# Patient Record
Sex: MALE | Race: WHITE | NOT HISPANIC OR LATINO | URBAN - METROPOLITAN AREA
[De-identification: names, ages, dates, MRNs, and addresses within clinical notes are randomized per-mention and may not be internally consistent; named-entity substitution may affect disease eponyms.]

---

## 2021-08-25 ENCOUNTER — INPATIENT (INPATIENT)
Facility: HOSPITAL | Age: 71
LOS: 15 days | Discharge: HOME HEALTH | DRG: 177 | End: 2021-09-10
Attending: FAMILY MEDICINE | Admitting: HOSPITALIST
Payer: MEDICARE

## 2021-08-25 VITALS
DIASTOLIC BLOOD PRESSURE: 61 MMHG | TEMPERATURE: 100 F | SYSTOLIC BLOOD PRESSURE: 131 MMHG | OXYGEN SATURATION: 86 % | RESPIRATION RATE: 18 BRPM | WEIGHT: 199.96 LBS | HEIGHT: 72 IN | HEART RATE: 81 BPM

## 2021-08-25 DIAGNOSIS — J96.00 ACUTE RESPIRATORY FAILURE, UNSPECIFIED WHETHER WITH HYPOXIA OR HYPERCAPNIA: ICD-10-CM

## 2021-08-25 LAB
ALBUMIN SERPL ELPH-MCNC: 2.4 G/DL — LOW (ref 3.3–5)
ALP SERPL-CCNC: 142 U/L — HIGH (ref 40–120)
ALT FLD-CCNC: 86 U/L — HIGH (ref 12–78)
ANION GAP SERPL CALC-SCNC: 8 MMOL/L — SIGNIFICANT CHANGE UP (ref 5–17)
APTT BLD: 25 SEC — LOW (ref 27.5–35.5)
AST SERPL-CCNC: 69 U/L — HIGH (ref 15–37)
BASE EXCESS BLDV CALC-SCNC: 1 MMOL/L — SIGNIFICANT CHANGE UP (ref -2–2)
BASOPHILS # BLD AUTO: 0.03 K/UL — SIGNIFICANT CHANGE UP (ref 0–0.2)
BASOPHILS NFR BLD AUTO: 0.3 % — SIGNIFICANT CHANGE UP (ref 0–2)
BILIRUB SERPL-MCNC: 1.5 MG/DL — HIGH (ref 0.2–1.2)
BUN SERPL-MCNC: 17 MG/DL — SIGNIFICANT CHANGE UP (ref 7–23)
CALCIUM SERPL-MCNC: 8.2 MG/DL — LOW (ref 8.5–10.1)
CHLORIDE SERPL-SCNC: 103 MMOL/L — SIGNIFICANT CHANGE UP (ref 96–108)
CK SERPL-CCNC: 78 U/L — SIGNIFICANT CHANGE UP (ref 26–308)
CO2 SERPL-SCNC: 24 MMOL/L — SIGNIFICANT CHANGE UP (ref 22–31)
CREAT SERPL-MCNC: 0.81 MG/DL — SIGNIFICANT CHANGE UP (ref 0.5–1.3)
D DIMER BLD IA.RAPID-MCNC: 1819 NG/ML DDU — HIGH
EOSINOPHIL # BLD AUTO: 0.12 K/UL — SIGNIFICANT CHANGE UP (ref 0–0.5)
EOSINOPHIL NFR BLD AUTO: 1 % — SIGNIFICANT CHANGE UP (ref 0–6)
GLUCOSE SERPL-MCNC: 118 MG/DL — HIGH (ref 70–99)
HCO3 BLDV-SCNC: 23 MMOL/L — SIGNIFICANT CHANGE UP (ref 21–29)
HCT VFR BLD CALC: 38.1 % — LOW (ref 39–50)
HGB BLD-MCNC: 13.7 G/DL — SIGNIFICANT CHANGE UP (ref 13–17)
IMM GRANULOCYTES NFR BLD AUTO: 1.5 % — SIGNIFICANT CHANGE UP (ref 0–1.5)
INR BLD: 1.22 RATIO — HIGH (ref 0.88–1.16)
LACTATE SERPL-SCNC: 2.4 MMOL/L — HIGH (ref 0.7–2)
LYMPHOCYTES # BLD AUTO: 0.51 K/UL — LOW (ref 1–3.3)
LYMPHOCYTES # BLD AUTO: 4.3 % — LOW (ref 13–44)
MCHC RBC-ENTMCNC: 32.9 PG — SIGNIFICANT CHANGE UP (ref 27–34)
MCHC RBC-ENTMCNC: 36 GM/DL — SIGNIFICANT CHANGE UP (ref 32–36)
MCV RBC AUTO: 91.6 FL — SIGNIFICANT CHANGE UP (ref 80–100)
MONOCYTES # BLD AUTO: 0.53 K/UL — SIGNIFICANT CHANGE UP (ref 0–0.9)
MONOCYTES NFR BLD AUTO: 4.5 % — SIGNIFICANT CHANGE UP (ref 2–14)
NEUTROPHILS # BLD AUTO: 10.45 K/UL — HIGH (ref 1.8–7.4)
NEUTROPHILS NFR BLD AUTO: 88.4 % — HIGH (ref 43–77)
NT-PROBNP SERPL-SCNC: 2205 PG/ML — HIGH (ref 0–125)
PCO2 BLDV: 29 MMHG — LOW (ref 35–50)
PH BLDV: 7.51 — HIGH (ref 7.35–7.45)
PLATELET # BLD AUTO: 252 K/UL — SIGNIFICANT CHANGE UP (ref 150–400)
PO2 BLDV: 60 MMHG — HIGH (ref 25–45)
POTASSIUM SERPL-MCNC: 3 MMOL/L — LOW (ref 3.5–5.3)
POTASSIUM SERPL-SCNC: 3 MMOL/L — LOW (ref 3.5–5.3)
PROT SERPL-MCNC: 6.5 GM/DL — SIGNIFICANT CHANGE UP (ref 6–8.3)
PROTHROM AB SERPL-ACNC: 14 SEC — HIGH (ref 10.6–13.6)
RBC # BLD: 4.16 M/UL — LOW (ref 4.2–5.8)
RBC # FLD: 12.2 % — SIGNIFICANT CHANGE UP (ref 10.3–14.5)
SAO2 % BLDV: 92 % — HIGH (ref 67–88)
SODIUM SERPL-SCNC: 135 MMOL/L — SIGNIFICANT CHANGE UP (ref 135–145)
TROPONIN I SERPL-MCNC: <0.015 NG/ML — SIGNIFICANT CHANGE UP (ref 0.01–0.04)
WBC # BLD: 11.82 K/UL — HIGH (ref 3.8–10.5)
WBC # FLD AUTO: 11.82 K/UL — HIGH (ref 3.8–10.5)

## 2021-08-25 PROCEDURE — 71275 CT ANGIOGRAPHY CHEST: CPT | Mod: 26

## 2021-08-25 PROCEDURE — 97162 PT EVAL MOD COMPLEX 30 MIN: CPT | Mod: GP

## 2021-08-25 PROCEDURE — 71275 CT ANGIOGRAPHY CHEST: CPT

## 2021-08-25 PROCEDURE — 36415 COLL VENOUS BLD VENIPUNCTURE: CPT

## 2021-08-25 PROCEDURE — 86140 C-REACTIVE PROTEIN: CPT

## 2021-08-25 PROCEDURE — 71045 X-RAY EXAM CHEST 1 VIEW: CPT | Mod: 26

## 2021-08-25 PROCEDURE — 83036 HEMOGLOBIN GLYCOSYLATED A1C: CPT

## 2021-08-25 PROCEDURE — 81001 URINALYSIS AUTO W/SCOPE: CPT

## 2021-08-25 PROCEDURE — 74176 CT ABD & PELVIS W/O CONTRAST: CPT

## 2021-08-25 PROCEDURE — 85379 FIBRIN DEGRADATION QUANT: CPT

## 2021-08-25 PROCEDURE — 82565 ASSAY OF CREATININE: CPT

## 2021-08-25 PROCEDURE — 86803 HEPATITIS C AB TEST: CPT

## 2021-08-25 PROCEDURE — 80053 COMPREHEN METABOLIC PANEL: CPT

## 2021-08-25 PROCEDURE — 93010 ELECTROCARDIOGRAM REPORT: CPT

## 2021-08-25 PROCEDURE — 71045 X-RAY EXAM CHEST 1 VIEW: CPT

## 2021-08-25 PROCEDURE — 94618 PULMONARY STRESS TESTING: CPT

## 2021-08-25 PROCEDURE — 84134 ASSAY OF PREALBUMIN: CPT

## 2021-08-25 PROCEDURE — 83605 ASSAY OF LACTIC ACID: CPT

## 2021-08-25 PROCEDURE — 97116 GAIT TRAINING THERAPY: CPT | Mod: GP

## 2021-08-25 PROCEDURE — 87521 HEPATITIS C PROBE&RVRS TRNSC: CPT

## 2021-08-25 PROCEDURE — 87070 CULTURE OTHR SPECIMN AEROBIC: CPT

## 2021-08-25 PROCEDURE — 82962 GLUCOSE BLOOD TEST: CPT

## 2021-08-25 PROCEDURE — 94640 AIRWAY INHALATION TREATMENT: CPT

## 2021-08-25 PROCEDURE — 85027 COMPLETE CBC AUTOMATED: CPT

## 2021-08-25 PROCEDURE — 99285 EMERGENCY DEPT VISIT HI MDM: CPT | Mod: CS

## 2021-08-25 PROCEDURE — 87040 BLOOD CULTURE FOR BACTERIA: CPT

## 2021-08-25 PROCEDURE — 87186 SC STD MICRODIL/AGAR DIL: CPT

## 2021-08-25 PROCEDURE — C9399: CPT

## 2021-08-25 PROCEDURE — 86769 SARS-COV-2 COVID-19 ANTIBODY: CPT

## 2021-08-25 PROCEDURE — 85730 THROMBOPLASTIN TIME PARTIAL: CPT

## 2021-08-25 PROCEDURE — 82728 ASSAY OF FERRITIN: CPT

## 2021-08-25 PROCEDURE — 85025 COMPLETE CBC W/AUTO DIFF WBC: CPT

## 2021-08-25 PROCEDURE — 80048 BASIC METABOLIC PNL TOTAL CA: CPT

## 2021-08-25 PROCEDURE — 80076 HEPATIC FUNCTION PANEL: CPT

## 2021-08-25 PROCEDURE — 93970 EXTREMITY STUDY: CPT

## 2021-08-25 PROCEDURE — 85610 PROTHROMBIN TIME: CPT

## 2021-08-25 PROCEDURE — 87077 CULTURE AEROBIC IDENTIFY: CPT

## 2021-08-25 PROCEDURE — 83880 ASSAY OF NATRIURETIC PEPTIDE: CPT

## 2021-08-25 PROCEDURE — 82248 BILIRUBIN DIRECT: CPT

## 2021-08-25 RX ORDER — REMDESIVIR 5 MG/ML
INJECTION INTRAVENOUS
Refills: 0 | Status: COMPLETED | OUTPATIENT
Start: 2021-08-25 | End: 2021-08-29

## 2021-08-25 RX ORDER — SODIUM CHLORIDE 9 MG/ML
500 INJECTION INTRAMUSCULAR; INTRAVENOUS; SUBCUTANEOUS ONCE
Refills: 0 | Status: COMPLETED | OUTPATIENT
Start: 2021-08-25 | End: 2021-08-25

## 2021-08-25 RX ORDER — DEXAMETHASONE 0.5 MG/5ML
6 ELIXIR ORAL ONCE
Refills: 0 | Status: COMPLETED | OUTPATIENT
Start: 2021-08-25 | End: 2021-08-25

## 2021-08-25 RX ORDER — ALBUTEROL 90 UG/1
2 AEROSOL, METERED ORAL EVERY 4 HOURS
Refills: 0 | Status: DISCONTINUED | OUTPATIENT
Start: 2021-08-25 | End: 2021-09-10

## 2021-08-25 RX ORDER — ACETAMINOPHEN 500 MG
1000 TABLET ORAL EVERY 6 HOURS
Refills: 0 | Status: DISCONTINUED | OUTPATIENT
Start: 2021-08-25 | End: 2021-08-26

## 2021-08-25 RX ORDER — REMDESIVIR 5 MG/ML
200 INJECTION INTRAVENOUS EVERY 24 HOURS
Refills: 0 | Status: COMPLETED | OUTPATIENT
Start: 2021-08-25 | End: 2021-08-25

## 2021-08-25 RX ORDER — REMDESIVIR 5 MG/ML
100 INJECTION INTRAVENOUS EVERY 24 HOURS
Refills: 0 | Status: COMPLETED | OUTPATIENT
Start: 2021-08-26 | End: 2021-08-29

## 2021-08-25 RX ORDER — POTASSIUM CHLORIDE 20 MEQ
40 PACKET (EA) ORAL ONCE
Refills: 0 | Status: COMPLETED | OUTPATIENT
Start: 2021-08-25 | End: 2021-08-25

## 2021-08-25 RX ADMIN — Medication 6 MILLIGRAM(S): at 21:19

## 2021-08-25 RX ADMIN — REMDESIVIR 580 MILLIGRAM(S): 5 INJECTION INTRAVENOUS at 22:54

## 2021-08-25 RX ADMIN — Medication 1000 MILLIGRAM(S): at 20:57

## 2021-08-25 RX ADMIN — ALBUTEROL 2 PUFF(S): 90 AEROSOL, METERED ORAL at 20:58

## 2021-08-25 RX ADMIN — Medication 40 MILLIEQUIVALENT(S): at 23:32

## 2021-08-25 RX ADMIN — SODIUM CHLORIDE 500 MILLILITER(S): 9 INJECTION INTRAMUSCULAR; INTRAVENOUS; SUBCUTANEOUS at 20:57

## 2021-08-25 NOTE — ED PROVIDER NOTE - NS ED ROS FT
Review of Systems:  	•	CONSTITUTIONAL: fever  	•	SKIN: no rash  	•	RESPIRATORY: Hypoxic  	•	CARDIAC: no chest pain  	•	GI:  no abd pain, no nausea, no vomiting, no diarrhea  	•	GENITO-URINARY:  no dysuria  	•	MUSCULOSKELETAL:  no back pain  	•	NEUROLOGIC: no weakness  	•	ALLERGY: no rhinorrhea  	•	PSYSCHIATRIC: appropriate concern about symptoms Review of Systems:  	•	CONSTITUTIONAL: fever  	•	SKIN: no rash  	•	RESPIRATORY: Hypoxic, SOB  	•	CARDIAC: no chest pain  	•	GI:  no abd pain, no nausea, no vomiting, no diarrhea  	•	GENITO-URINARY:  no dysuria  	•	MUSCULOSKELETAL:  no back pain  	•	NEUROLOGIC: no weakness  	•	ALLERGY: no rhinorrhea  	•	PSYSCHIATRIC: appropriate concern about symptoms Review of Systems:  	•	CONSTITUTIONAL: fever, chills  	•	SKIN: no rash  	•	RESPIRATORY: Hypoxic, SOB, SALDAÑA  	•	CARDIAC: no chest pain  	•	GI:  no abd pain, no nausea, no vomiting, no diarrhea  	•	GENITO-URINARY:  no dysuria  	•	MUSCULOSKELETAL:  body aches  	•	NEUROLOGIC: no weakness  	•	ALLERGY: no rhinorrhea  	•	PSYSCHIATRIC: appropriate concern about symptoms

## 2021-08-25 NOTE — ED PROVIDER NOTE - PROGRESS NOTE DETAILS
meli: Discussed need to admit with patient & discussed risk and benefits.  Patient agreed to admission.  Discussed case w/ admitting doctor - agreed to admit to their service. will place bridge orders. Accepting physician said:  DO NOT MOVE prior to inpatient team evaluation

## 2021-08-25 NOTE — ED PROVIDER NOTE - OBJECTIVE STATEMENT
Pertinent HPI/PMH/PSH/FHx/SHx and Review of Systems contained within  HPI:  Patient p/w CC   x  days, new onset vs acute on chronic.   PMH/PSH relevant for:   ROS negative for: fever, Chest pain, SOB, Nausea, vomiting, diarrhea, abdominal pain, dysuria    FamilyHx and SocialHx not otherwise contributory Pertinent HPI/PMH/PSH/FHx/SHx and Review of Systems contained within  HPI:  Patient p/w CC  fever x  days, new onset vs acute on chronic.   PMH/PSH relevant for:   ROS negative for: fever, Chest pain, SOB, Nausea, vomiting, diarrhea, abdominal pain, dysuria    FamilyHx and SocialHx not otherwise contributory Pertinent HPI/PMH/PSH/FHx/SHx and Review of Systems contained within  HPI:  Patient p/w CC  SOB and SALDAÑA x 2-3 days. Over past 2 weeks have had fevers, chills, cough, and body aches.  Of note, patient's O2 sat 86% on room air.   PMH/PSH relevant for: HTN  ROS negative for: Chest pain, Nausea, vomiting, diarrhea, abdominal pain, dysuria    FamilyHx and SocialHx not otherwise contributory Pertinent HPI/PMH/PSH/FHx/SHx and Review of Systems contained within  HPI:  Patient p/w CC  SOB and SALDAÑA x 2-3 days. Over past 2 weeks have had fevers, chills, cough, and body aches. Pt is not COVID vaccinated. Of note, patient's O2 sat 86% on room air.   PMH/PSH relevant for: HTN  ROS negative for: Chest pain, Nausea, vomiting, diarrhea, abdominal pain, dysuria    FamilyHx and SocialHx not otherwise contributory

## 2021-08-25 NOTE — ED PROVIDER NOTE - CLINICAL SUMMARY MEDICAL DECISION MAKING FREE TEXT BOX
URI complaints, hypoxic O2 86% on room air. Likely due to COVID since pt is unvaccinated. Will need admission.

## 2021-08-25 NOTE — ED ADULT NURSE REASSESSMENT NOTE - NS ED NURSE REASSESS COMMENT FT1
dyspnea upon exertion persists, current o2 Sat 92% via 4lNC, pt intermittent productive cough. Remdesivir IV in progress. Will continue to monitor.

## 2021-08-25 NOTE — ED PROVIDER NOTE - IV ALTEPASE ADMIN HIDDEN
Fax received from [facility] [place] regarding [place]  Pt location at SNF: [address] wing  Fax sent by:     Fax regarding concern: Update    Assessment:         Any recommendations or new orders?      
Noted.   [facility]    
Noted. Thank you  
show

## 2021-08-25 NOTE — ED PROVIDER NOTE - CARE PLAN
1 Principal Discharge DX:	Acute respiratory failure, unspecified whether with hypoxia or hypercapnia  Secondary Diagnosis:	Fever

## 2021-08-26 DIAGNOSIS — Z98.890 OTHER SPECIFIED POSTPROCEDURAL STATES: Chronic | ICD-10-CM

## 2021-08-26 LAB
-  COAGULASE NEGATIVE STAPHYLOCOCCUS: SIGNIFICANT CHANGE UP
A1C WITH ESTIMATED AVERAGE GLUCOSE RESULT: 6.3 % — HIGH (ref 4–5.6)
ALBUMIN SERPL ELPH-MCNC: 2.1 G/DL — LOW (ref 3.3–5)
ALP SERPL-CCNC: 133 U/L — HIGH (ref 40–120)
ALT FLD-CCNC: 78 U/L — SIGNIFICANT CHANGE UP (ref 12–78)
ANION GAP SERPL CALC-SCNC: 7 MMOL/L — SIGNIFICANT CHANGE UP (ref 5–17)
AST SERPL-CCNC: 57 U/L — HIGH (ref 15–37)
BASOPHILS # BLD AUTO: 0 K/UL — SIGNIFICANT CHANGE UP (ref 0–0.2)
BASOPHILS NFR BLD AUTO: 0 % — SIGNIFICANT CHANGE UP (ref 0–2)
BILIRUB SERPL-MCNC: 0.8 MG/DL — SIGNIFICANT CHANGE UP (ref 0.2–1.2)
BUN SERPL-MCNC: 19 MG/DL — SIGNIFICANT CHANGE UP (ref 7–23)
CALCIUM SERPL-MCNC: 8.3 MG/DL — LOW (ref 8.5–10.1)
CHLORIDE SERPL-SCNC: 108 MMOL/L — SIGNIFICANT CHANGE UP (ref 96–108)
CO2 SERPL-SCNC: 22 MMOL/L — SIGNIFICANT CHANGE UP (ref 22–31)
COVID-19 SPIKE DOMAIN AB INTERP: POSITIVE
COVID-19 SPIKE DOMAIN ANTIBODY RESULT: 186 U/ML — HIGH
CREAT SERPL-MCNC: 0.89 MG/DL — SIGNIFICANT CHANGE UP (ref 0.5–1.3)
CRP SERPL-MCNC: 224 MG/L — HIGH
EOSINOPHIL # BLD AUTO: 0 K/UL — SIGNIFICANT CHANGE UP (ref 0–0.5)
EOSINOPHIL NFR BLD AUTO: 0 % — SIGNIFICANT CHANGE UP (ref 0–6)
ESTIMATED AVERAGE GLUCOSE: 134 MG/DL — HIGH (ref 68–114)
FERRITIN SERPL-MCNC: 1675 NG/ML — HIGH (ref 30–400)
GLUCOSE SERPL-MCNC: 269 MG/DL — HIGH (ref 70–99)
GRAM STN FLD: SIGNIFICANT CHANGE UP
GRAM STN FLD: SIGNIFICANT CHANGE UP
HCT VFR BLD CALC: 36.2 % — LOW (ref 39–50)
HCV AB S/CO SERPL IA: 7.16 S/CO — HIGH (ref 0–0.99)
HCV AB SERPL-IMP: REACTIVE
HGB BLD-MCNC: 12.6 G/DL — LOW (ref 13–17)
LACTATE SERPL-SCNC: 2.5 MMOL/L — HIGH (ref 0.7–2)
LYMPHOCYTES # BLD AUTO: 0.43 K/UL — LOW (ref 1–3.3)
LYMPHOCYTES # BLD AUTO: 5 % — LOW (ref 13–44)
MCHC RBC-ENTMCNC: 32.2 PG — SIGNIFICANT CHANGE UP (ref 27–34)
MCHC RBC-ENTMCNC: 34.8 GM/DL — SIGNIFICANT CHANGE UP (ref 32–36)
MCV RBC AUTO: 92.6 FL — SIGNIFICANT CHANGE UP (ref 80–100)
METHOD TYPE: SIGNIFICANT CHANGE UP
MONOCYTES # BLD AUTO: 0.35 K/UL — SIGNIFICANT CHANGE UP (ref 0–0.9)
MONOCYTES NFR BLD AUTO: 4 % — SIGNIFICANT CHANGE UP (ref 2–14)
NEUTROPHILS # BLD AUTO: 7.86 K/UL — HIGH (ref 1.8–7.4)
NEUTROPHILS NFR BLD AUTO: 91 % — HIGH (ref 43–77)
NRBC # BLD: SIGNIFICANT CHANGE UP /100 WBCS (ref 0–0)
PLATELET # BLD AUTO: 230 K/UL — SIGNIFICANT CHANGE UP (ref 150–400)
POTASSIUM SERPL-MCNC: 3.6 MMOL/L — SIGNIFICANT CHANGE UP (ref 3.5–5.3)
POTASSIUM SERPL-SCNC: 3.6 MMOL/L — SIGNIFICANT CHANGE UP (ref 3.5–5.3)
PREALB SERPL-MCNC: 4 MG/DL — LOW (ref 20–40)
PROCALCITONIN SERPL-MCNC: 0.33 NG/ML — HIGH (ref 0.02–0.1)
PROT SERPL-MCNC: 6.1 GM/DL — SIGNIFICANT CHANGE UP (ref 6–8.3)
RAPID RVP RESULT: DETECTED
RBC # BLD: 3.91 M/UL — LOW (ref 4.2–5.8)
RBC # FLD: 12.4 % — SIGNIFICANT CHANGE UP (ref 10.3–14.5)
SARS-COV-2 IGG+IGM SERPL QL IA: 186 U/ML — HIGH
SARS-COV-2 IGG+IGM SERPL QL IA: POSITIVE
SARS-COV-2 RNA SPEC QL NAA+PROBE: DETECTED
SODIUM SERPL-SCNC: 137 MMOL/L — SIGNIFICANT CHANGE UP (ref 135–145)
SPECIMEN SOURCE: SIGNIFICANT CHANGE UP
WBC # BLD: 8.64 K/UL — SIGNIFICANT CHANGE UP (ref 3.8–10.5)
WBC # FLD AUTO: 8.64 K/UL — SIGNIFICANT CHANGE UP (ref 3.8–10.5)

## 2021-08-26 PROCEDURE — 12345: CPT | Mod: NC

## 2021-08-26 PROCEDURE — 99497 ADVNCD CARE PLAN 30 MIN: CPT | Mod: 25

## 2021-08-26 PROCEDURE — 99223 1ST HOSP IP/OBS HIGH 75: CPT

## 2021-08-26 PROCEDURE — 74176 CT ABD & PELVIS W/O CONTRAST: CPT | Mod: 26

## 2021-08-26 RX ORDER — DULOXETINE HYDROCHLORIDE 30 MG/1
60 CAPSULE, DELAYED RELEASE ORAL DAILY
Refills: 0 | Status: DISCONTINUED | OUTPATIENT
Start: 2021-08-26 | End: 2021-09-10

## 2021-08-26 RX ORDER — ONDANSETRON 8 MG/1
4 TABLET, FILM COATED ORAL EVERY 8 HOURS
Refills: 0 | Status: DISCONTINUED | OUTPATIENT
Start: 2021-08-26 | End: 2021-09-10

## 2021-08-26 RX ORDER — POTASSIUM CHLORIDE 20 MEQ
40 PACKET (EA) ORAL ONCE
Refills: 0 | Status: COMPLETED | OUTPATIENT
Start: 2021-08-26 | End: 2021-08-26

## 2021-08-26 RX ORDER — DEXTROSE 50 % IN WATER 50 %
15 SYRINGE (ML) INTRAVENOUS ONCE
Refills: 0 | Status: DISCONTINUED | OUTPATIENT
Start: 2021-08-26 | End: 2021-09-10

## 2021-08-26 RX ORDER — ZOLPIDEM TARTRATE 10 MG/1
5 TABLET ORAL AT BEDTIME
Refills: 0 | Status: DISCONTINUED | OUTPATIENT
Start: 2021-08-26 | End: 2021-08-27

## 2021-08-26 RX ORDER — DEXTROSE 50 % IN WATER 50 %
25 SYRINGE (ML) INTRAVENOUS ONCE
Refills: 0 | Status: DISCONTINUED | OUTPATIENT
Start: 2021-08-26 | End: 2021-09-10

## 2021-08-26 RX ORDER — GLUCAGON INJECTION, SOLUTION 0.5 MG/.1ML
1 INJECTION, SOLUTION SUBCUTANEOUS ONCE
Refills: 0 | Status: DISCONTINUED | OUTPATIENT
Start: 2021-08-26 | End: 2021-09-10

## 2021-08-26 RX ORDER — INSULIN LISPRO 100/ML
VIAL (ML) SUBCUTANEOUS AT BEDTIME
Refills: 0 | Status: DISCONTINUED | OUTPATIENT
Start: 2021-08-26 | End: 2021-09-10

## 2021-08-26 RX ORDER — ZOLPIDEM TARTRATE 10 MG/1
5 TABLET ORAL AT BEDTIME
Refills: 0 | Status: DISCONTINUED | OUTPATIENT
Start: 2021-08-26 | End: 2021-08-26

## 2021-08-26 RX ORDER — ACETAMINOPHEN 500 MG
650 TABLET ORAL EVERY 4 HOURS
Refills: 0 | Status: DISCONTINUED | OUTPATIENT
Start: 2021-08-26 | End: 2021-09-10

## 2021-08-26 RX ORDER — GUAIFENESIN/DEXTROMETHORPHAN 600MG-30MG
10 TABLET, EXTENDED RELEASE 12 HR ORAL EVERY 4 HOURS
Refills: 0 | Status: DISCONTINUED | OUTPATIENT
Start: 2021-08-26 | End: 2021-09-10

## 2021-08-26 RX ORDER — DEXTROSE 50 % IN WATER 50 %
12.5 SYRINGE (ML) INTRAVENOUS ONCE
Refills: 0 | Status: DISCONTINUED | OUTPATIENT
Start: 2021-08-26 | End: 2021-09-10

## 2021-08-26 RX ORDER — ZOLPIDEM TARTRATE 10 MG/1
5 TABLET ORAL AT BEDTIME
Refills: 0 | Status: DISCONTINUED | OUTPATIENT
Start: 2021-08-26 | End: 2021-09-02

## 2021-08-26 RX ORDER — VANCOMYCIN HCL 1 G
VIAL (EA) INTRAVENOUS
Refills: 0 | Status: DISCONTINUED | OUTPATIENT
Start: 2021-08-26 | End: 2021-08-26

## 2021-08-26 RX ORDER — SODIUM CHLORIDE 9 MG/ML
1000 INJECTION, SOLUTION INTRAVENOUS
Refills: 0 | Status: DISCONTINUED | OUTPATIENT
Start: 2021-08-26 | End: 2021-09-10

## 2021-08-26 RX ORDER — INSULIN LISPRO 100/ML
VIAL (ML) SUBCUTANEOUS
Refills: 0 | Status: DISCONTINUED | OUTPATIENT
Start: 2021-08-26 | End: 2021-09-10

## 2021-08-26 RX ORDER — AMLODIPINE BESYLATE 2.5 MG/1
5 TABLET ORAL DAILY
Refills: 0 | Status: DISCONTINUED | OUTPATIENT
Start: 2021-08-26 | End: 2021-09-10

## 2021-08-26 RX ORDER — ALBUTEROL 90 UG/1
2 AEROSOL, METERED ORAL EVERY 4 HOURS
Refills: 0 | Status: DISCONTINUED | OUTPATIENT
Start: 2021-08-26 | End: 2021-08-26

## 2021-08-26 RX ORDER — SIMETHICONE 80 MG/1
80 TABLET, CHEWABLE ORAL
Refills: 0 | Status: DISCONTINUED | OUTPATIENT
Start: 2021-08-26 | End: 2021-09-10

## 2021-08-26 RX ORDER — DULOXETINE HYDROCHLORIDE 30 MG/1
1 CAPSULE, DELAYED RELEASE ORAL
Qty: 0 | Refills: 0 | DISCHARGE

## 2021-08-26 RX ORDER — ZOLPIDEM TARTRATE 10 MG/1
1 TABLET ORAL
Qty: 0 | Refills: 0 | DISCHARGE

## 2021-08-26 RX ORDER — DEXAMETHASONE 0.5 MG/5ML
6 ELIXIR ORAL DAILY
Refills: 0 | Status: DISCONTINUED | OUTPATIENT
Start: 2021-08-26 | End: 2021-09-04

## 2021-08-26 RX ORDER — ENOXAPARIN SODIUM 100 MG/ML
40 INJECTION SUBCUTANEOUS DAILY
Refills: 0 | Status: DISCONTINUED | OUTPATIENT
Start: 2021-08-26 | End: 2021-08-29

## 2021-08-26 RX ORDER — FLUOROURACIL/ADHESIVE BANDAGE 5 %
0 KIT TOPICAL
Qty: 0 | Refills: 0 | DISCHARGE

## 2021-08-26 RX ORDER — VANCOMYCIN HCL 1 G
1000 VIAL (EA) INTRAVENOUS ONCE
Refills: 0 | Status: COMPLETED | OUTPATIENT
Start: 2021-08-26 | End: 2021-08-26

## 2021-08-26 RX ADMIN — ZOLPIDEM TARTRATE 5 MILLIGRAM(S): 10 TABLET ORAL at 02:14

## 2021-08-26 RX ADMIN — Medication 250 MILLIGRAM(S): at 20:11

## 2021-08-26 RX ADMIN — Medication 40 MILLIEQUIVALENT(S): at 11:31

## 2021-08-26 RX ADMIN — Medication 100 MILLIGRAM(S): at 20:17

## 2021-08-26 RX ADMIN — Medication 2: at 21:41

## 2021-08-26 RX ADMIN — ENOXAPARIN SODIUM 40 MILLIGRAM(S): 100 INJECTION SUBCUTANEOUS at 11:31

## 2021-08-26 RX ADMIN — ZOLPIDEM TARTRATE 5 MILLIGRAM(S): 10 TABLET ORAL at 20:46

## 2021-08-26 RX ADMIN — SIMETHICONE 80 MILLIGRAM(S): 80 TABLET, CHEWABLE ORAL at 14:55

## 2021-08-26 RX ADMIN — AMLODIPINE BESYLATE 5 MILLIGRAM(S): 2.5 TABLET ORAL at 20:07

## 2021-08-26 RX ADMIN — Medication 6 MILLIGRAM(S): at 11:31

## 2021-08-26 RX ADMIN — Medication 100 MILLIGRAM(S): at 10:02

## 2021-08-26 RX ADMIN — REMDESIVIR 540 MILLIGRAM(S): 5 INJECTION INTRAVENOUS at 23:03

## 2021-08-26 RX ADMIN — SIMETHICONE 80 MILLIGRAM(S): 80 TABLET, CHEWABLE ORAL at 20:07

## 2021-08-26 RX ADMIN — ONDANSETRON 4 MILLIGRAM(S): 8 TABLET, FILM COATED ORAL at 14:55

## 2021-08-26 RX ADMIN — DULOXETINE HYDROCHLORIDE 60 MILLIGRAM(S): 30 CAPSULE, DELAYED RELEASE ORAL at 16:48

## 2021-08-26 NOTE — DIETITIAN NUTRITION RISK NOTIFICATION - TREATMENT: THE FOLLOWING DIET HAS BEEN RECOMMENDED
Diet, Consistent Carbohydrate w/Evening Snack:   DASH/TLC {Sodium & Cholesterol Restricted} (DASH)  Supplement Feeding Modality:  Oral  Glucerna Shake Cans or Servings Per Day:  1       Frequency:  Three Times a day (08-26-21 @ 13:29) [Pending Verification By Attending]  Diet, DASH/TLC:   Sodium & Cholesterol Restricted (08-25-21 @ 22:30) [Active]

## 2021-08-26 NOTE — DIETITIAN INITIAL EVALUATION ADULT. - MALNUTRITION
moderate malnutrition in acute illness moderate malnutrition in acute illness r/t COVID AEB meeting <50% of ENN x 1 wk; mild muscle wasting

## 2021-08-26 NOTE — PROVIDER CONTACT NOTE (CRITICAL VALUE NOTIFICATION) - TEST AND RESULT REPORTED:
Lactate 2.4
Blood culture collected 8/25 @ 1849 growth in aerobic bottle gram + cocci in clusters.....ALSO HEPATITIS C positive

## 2021-08-26 NOTE — DIETITIAN INITIAL EVALUATION ADULT. - ADD RECOMMEND
1) add consistent carb and glucerna TID to diet Rx 2) consider checking vitamin D level and supplement prn 3) obtain new wt to check for weight loss (with standing scale) 4) encourage protein/calorie intake 5) add MVI with minerals daily to ensure 100% RDI met

## 2021-08-26 NOTE — PROGRESS NOTE ADULT - SUBJECTIVE AND OBJECTIVE BOX
CC: COVID (26 Aug 2021 13:16)    HPI:  Pt is a 70 yo male with a pmh/o HTN, HLD, skin CA who is a smoker and who quit smoking two weeks ago due to malaise, who presents to ED today due to worsening shortness of breath, cough, body aches and pains, fever, chills. Pt states symptoms have gotten significantly worse over past two days. Pt is not COVID vaccinated. Arrived with oxygen saturation at 86% on RA. Improved to mid 90's on 4L NC. Denies cp, palpitations, n/v/d, abd pain, constipation, rash, leg swelling, calf pain, dysuria, hematuria, sputum production, hemoptysis, HA.  (26 Aug 2021 04:01)    INTERVAL HPI/OVERNIGHT EVENTS:    Vital Signs Last 24 Hrs  T(C): 36.7 (26 Aug 2021 12:15), Max: 37.9 (25 Aug 2021 19:59)  T(F): 98 (26 Aug 2021 12:15), Max: 100.3 (25 Aug 2021 19:59)  HR: 88 (26 Aug 2021 12:15) (70 - 88)  BP: 129/77 (26 Aug 2021 12:15) (113/61 - 131/76)  BP(mean): 78 (26 Aug 2021 05:01) (78 - 79)  RR: 18 (26 Aug 2021 12:15) (17 - 24)  SpO2: 91% (26 Aug 2021 12:15) (86% - 93%)  I&O's Detail    REVIEW OF SYSTEMS:    CONSTITUTIONAL: No weakness, fevers or chills  EYES/ENT: No visual changes;  No vertigo or throat pain   NECK: No pain or stiffness  RESPIRATORY: No cough, wheezing, hemoptysis; No shortness of breath  CARDIOVASCULAR: No chest pain or palpitations  GASTROINTESTINAL: No abdominal or epigastric pain. No nausea, vomiting, or hematemesis; No diarrhea or constipation. No melena or hematochezia.  GENITOURINARY: No dysuria, frequency or hematuria  NEUROLOGICAL: No numbness or weakness  SKIN: No itching, burning, rashes, or lesions   All other review of systems is negative unless indicated above.  PHYSICAL EXAM:    General: Well developed; well nourished; in no acute distress  Eyes: PERRLA, EOMI; conjunctiva and sclera clear  Head: Normocephalic; atraumatic  ENMT: No nasal discharge; airway clear  Neck: Supple; non tender; no masses  Respiratory: No wheezes, rales or rhonchi  Cardiovascular: Regular rate and rhythm. S1 and S2 Normal; No murmurs, gallops or rubs  Gastrointestinal: Soft non-tender non-distended; Normal bowel sounds  Genitourinary: No  suprapubic  tenderness  Extremities: Normal range of motion, No clubbing, cyanosis or edema  Vascular: Peripheral pulses palpable 2+ bilaterally  Neurological: Alert and oriented x4  Skin: Warm and dry. No acute rash  Lymph Nodes: No acute cervical adenopathy  Musculoskeletal: Normal muscle tone, without deformities  Psychiatric: Cooperative and appropriate  CARDIAC MARKERS ( 25 Aug 2021 20:49 )  <0.015 ng/mL / x     / 78 U/L / x     / x                                12.6   8.64  )-----------( 230      ( 26 Aug 2021 08:43 )             36.2     26 Aug 2021 08:43    137    |  108    |  19     ----------------------------<  269    3.6     |  22     |  0.89     Ca    8.3        26 Aug 2021 08:43    TPro  6.1    /  Alb  2.1    /  TBili  0.8    /  DBili  x      /  AST  57     /  ALT  78     /  AlkPhos  133    26 Aug 2021 08:43    PT/INR - ( 25 Aug 2021 20:49 )   PT: 14.0 sec;   INR: 1.22 ratio    PTT - ( 25 Aug 2021 20:49 )  PTT:25.0 sec      LIVER FUNCTIONS - ( 26 Aug 2021 08:43 )  Alb: 2.1 g/dL / Pro: 6.1 gm/dL / ALK PHOS: 133 U/L / ALT: 78 U/L / AST: 57 U/L / GGT: x               MEDICATIONS  (STANDING):  dexAMETHasone  Injectable 6 milliGRAM(s) IV Push daily  DULoxetine 60 milliGRAM(s) Oral daily  enoxaparin Injectable 40 milliGRAM(s) SubCutaneous daily  remdesivir  IVPB 100 milliGRAM(s) IV Intermittent every 24 hours  remdesivir  IVPB   IV Intermittent     MEDICATIONS  (PRN):  acetaminophen   Tablet .. 650 milliGRAM(s) Oral every 4 hours PRN Temp greater or equal to 38C (100.4F), Mild Pain (1 - 3)  ALBUTerol    90 MICROgram(s) HFA Inhaler 2 Puff(s) Inhalation every 4 hours PRN Shortness of Breath and/or Wheezing  benzonatate 100 milliGRAM(s) Oral three times a day PRN Cough  guaifenesin/dextromethorphan Oral Liquid 10 milliLiter(s) Oral every 4 hours PRN Cough  zolpidem 5 milliGRAM(s) Oral at bedtime PRN Insomnia  zolpidem 5 milliGRAM(s) Oral at bedtime PRN Insomnia      RADIOLOGY & ADDITIONAL TESTS: CC: COVID (26 Aug 2021 13:16)    HPI:  Pt is a 72 yo male with a pmh/o HTN, HLD, skin CA who is a smoker and who quit smoking two weeks ago due to malaise, who presents to ED today due to worsening shortness of breath, cough, body aches and pains, fever, chills. Pt states symptoms have gotten significantly worse over past two days. Pt is not COVID vaccinated. Arrived with oxygen saturation at 86% on RA. Improved to mid 90's on 4L NC. Denies cp, palpitations, n/v/d, abd pain, constipation, rash, leg swelling, calf pain, dysuria, hematuria, sputum production, hemoptysis, HA.      INTERVAL HPI/ OVERNIGHT EVENTS: chart reviewed, Pt was seen and examined, reports feeling  breathing better on O2, c/p abd pain, constipation, but was able to have small BM today. Also mild nausea     Vital Signs Last 24 Hrs  T(C): 36.7 (26 Aug 2021 12:15), Max: 37.9 (25 Aug 2021 19:59)  T(F): 98 (26 Aug 2021 12:15), Max: 100.3 (25 Aug 2021 19:59)  HR: 88 (26 Aug 2021 12:15) (70 - 88)  BP: 129/77 (26 Aug 2021 12:15) (113/61 - 131/76)  BP(mean): 78 (26 Aug 2021 05:01) (78 - 79)  RR: 18 (26 Aug 2021 12:15) (17 - 24)  SpO2: 91% (26 Aug 2021 12:15) (86% - 93%)      REVIEW OF SYSTEMS:  All other review of systems is negative unless indicated above.      PHYSICAL EXAM:  General: Well developed: looks acutely ill, mildly dyspneic on conversation, on NC  Eyes: EOMI; conjunctiva and sclera clear  Head: Normocephalic; atraumatic  ENMT: No nasal discharge; airway clear  Neck: Supple; non tender; no masses  Respiratory: Diminished BS,  No wheezes, rales or rhonchi  Cardiovascular: Regular rate and rhythm. S1 and S2 Normal;   Gastrointestinal: Soft , mildly distended, mildly tender at L lower abd, + bowel sounds  Genitourinary: No  suprapubic  tenderness  Extremities: No LE  edema  Vascular: Peripheral pulses palpable 2+ bilaterally  Neurological: Alert and oriented x3, non focal   Musculoskeletal: Normal muscle tone and strength   Psychiatric: Cooperative        LABS:   CARDIAC MARKERS ( 25 Aug 2021 20:49 )  <0.015 ng/mL / x     / 78 U/L / x     / x                                12.6   8.64  )-----------( 230      ( 26 Aug 2021 08:43 )             36.2     26 Aug 2021 08:43    137    |  108    |  19     ----------------------------<  269    3.6     |  22     |  0.89     Ca    8.3        26 Aug 2021 08:43    TPro  6.1    /  Alb  2.1    /  TBili  0.8    /  DBili  x      /  AST  57     /  ALT  78     /  AlkPhos  133    26 Aug 2021 08:43    PT/INR - ( 25 Aug 2021 20:49 )   PT: 14.0 sec;   INR: 1.22 ratio    PTT - ( 25 Aug 2021 20:49 )  PTT:25.0 sec      LIVER FUNCTIONS - ( 26 Aug 2021 08:43 )  Alb: 2.1 g/dL / Pro: 6.1 gm/dL / ALK PHOS: 133 U/L / ALT: 78 U/L / AST: 57 U/L / GGT: x               MEDICATIONS  (STANDING):  dexAMETHasone  Injectable 6 milliGRAM(s) IV Push daily  DULoxetine 60 milliGRAM(s) Oral daily  enoxaparin Injectable 40 milliGRAM(s) SubCutaneous daily  remdesivir  IVPB 100 milliGRAM(s) IV Intermittent every 24 hours  remdesivir  IVPB   IV Intermittent     MEDICATIONS  (PRN):  acetaminophen   Tablet .. 650 milliGRAM(s) Oral every 4 hours PRN Temp greater or equal to 38C (100.4F), Mild Pain (1 - 3)  ALBUTerol    90 MICROgram(s) HFA Inhaler 2 Puff(s) Inhalation every 4 hours PRN Shortness of Breath and/or Wheezing  benzonatate 100 milliGRAM(s) Oral three times a day PRN Cough  guaifenesin/dextromethorphan Oral Liquid 10 milliLiter(s) Oral every 4 hours PRN Cough  zolpidem 5 milliGRAM(s) Oral at bedtime PRN Insomnia  zolpidem 5 milliGRAM(s) Oral at bedtime PRN Insomnia      RADIOLOGY & ADDITIONAL TESTS:  < from: CT Angio Chest PE Protocol w/ IV Cont (08.25.21 @ 22:53) >    EXAM:  CT ANGIO CHEST PULM SALONI GEORGE                            PROCEDURE DATE:  08/25/2021          INTERPRETATION:  CLINICAL INFORMATION: COVID positive.  Positive d-dimer.    COMPARISON: 11/16/2012    CONTRAST/COMPLICATIONS:  IV Contrast: Omnipaque 350  90 cc administered   10 cc discarded  Oral Contrast: NONE  Complications: None reported at time of study completion    PROCEDURE:  CT Angiography of the Chest.  Sagittal and coronal reformats were performed as well as 3D (MIP) reconstructions.    FINDINGS:    LUNGS AND AIRWAYS: Patent central airways.  There is severe patchy and partially confluent groundglass opacity in both lungs with patchy areas of more dense airspace disease.  A 2.5 x 2 cm right lower lobe nodular opacity appears more nodular and solid  PLEURA: Small pleural effusions bilaterally.  MEDIASTINUM AND DONAVON: There are numerous mediastinal and hilar lymph nodes measuring up to approximately 1.5 cm short access in the subcarinal region (2:58) and 1.4 cm short access in the left hilum (2:51).  There is confluent soft tissue in the right hilum, surrounding the right upper lobe bronchus (2:45; there is no narrowing of the airway.  VESSELS: No pulmonary embolism.  Mild atherosclerosis of the visualized aorta and branch vessels.  HEART: The heart appears mildly enlarged.  Mild to moderate atherosclerotic calcification of the coronary arteries. No pericardial effusion.  CHEST WALL AND LOWER NECK: Within normal limits.  VISUALIZED UPPER ABDOMEN: Small hiatal hernia.  Enlarged spleen measures up to 15.3 cm in the axial plane (2:114.  Multiple left upper pole renal cysts are noted.  BONES: Degenerative changes in the spine.    IMPRESSION:  No pulmonary embolism.    Severe patchy and partially confluent groundglass opacity in both lungs with patchy areas of more dense airspace disease is compatible with with COVID-19 pneumonia.    Small pleural effusions bilaterally.    A 2.5 x 2 cm right lower lobe nodular opacity appears more nodular and solid.  Numerous mediastinal and hilar lymph nodes measure up to 1.5 cm short axis in subcarina region and 1.4 cm short axis in the left hilum.  There is confluent soft tissue in the right hilum, surrounding the right upper lobe bronchus without associated airway narrowing.Underlying malignancy/lung cancer is not excluded.  Repeat chest CT scan is recommended in approximately one month after therapy and resolution of COVID-19 pneumonia two reassess the lung findings.

## 2021-08-26 NOTE — DIETITIAN INITIAL EVALUATION ADULT. - OTHER INFO
70yo male with PMH significant for HTN, HLD, skin CA, who is a smoker and quit ~2wks ago p/w worsening SOB, cough, and body aches;  pt admitted with acute hypoxic resp distress 2/2 COVID-19 PNA, hyperglycemia (pending A1C), hypokalemia.

## 2021-08-26 NOTE — H&P ADULT - NSICDXFAMILYHX_GEN_ALL_CORE_FT
FAMILY HISTORY:  Father  Still living? Unknown  FH: Parkinson's disease, Age at diagnosis: Age Unknown    Mother  Still living? Unknown  FH: Parkinson's disease, Age at diagnosis: Age Unknown

## 2021-08-26 NOTE — PATIENT PROFILE ADULT - NSPROPOAURINARYCATHETER_GEN_A_NUR
Initial / Assessment/Plan of Care Note     Baseline Assessment  50 year old admitted 5/6/2021 as Observation with a diagnosis of hyperkalemia, complication of arteriovenous dialysis fistula.   Prior to admission patient was living with Spouse/significant other, Children and residing at House.  Patient does not  have a Power of  for Healthcare. Patient’s Primary Care Provider is Marvin Hyatt MD.     Medical History  Past Medical History:   Diagnosis Date   • A-V fistula (CMS/[facility])     left forearm   • Anuria    • Arthritis     back/hips   • Chronic kidney disease     End stage kidney disease Dialysis on Tues./Thurs./ Sat.@[facility]   • Gastroesophageal reflux disease    • Hematuria 8/00    Kidney Biopsy sched. for Nov. 2   • High cholesterol    • Hx of ileostomy    • Malignant neoplasm (CMS/[facility]) 10/2019    thyroid   • Pneumonia 2009   • Proteinuria 8/00    See above   • Secondary hyperparathyroidism (CMS/[facility])    • Sleep apnea     no CPAP   • Thyroid condition    • Wears eyeglasses        Prior to Admission Status  Functional Status  Ambulation: Independent/Self  Bathing: Independent/Self  Dressing: Independent/Self  Toileting: Independent/Self  Meal Preparation: Independent/Self  Shopping: Independent/Self  Medication Preparation: Independent/Self  Medication Administration: Independent/Self  Housekeeping: Independent/Self, Significant Other  Laundry: Independent/Self, Significant Other  Transportation: Independent/Self, Family    Agency/Support  Type of Services Prior to Hospitalization: Outpatient services(Outpatient Dialysis)  Support Systems: Family members, [place] community, Friends/neighbors, Spouse/Significant other, Children  Home Devices/Equipment: None  Mobility Assist Devices: None  Sensory Support Devices: Eyeglasses    Current Status  Current Mental Status: Cooperative    Insurance  Primary: MEDICARE  Secondary: [place] MEDICAID    Barriers to Discharge  Identified Barriers to  Discharge/Transition Planning: Pending procedure (specify below)(IR procedure )    Progress Note  SW opening case for discharge planning due to pt receiving outpatient dialysis. Chart reviewed. At baseline, pt resides at home with his wife and children. He is independent with ambulation and ADLs. Pt and family share housekeeping and cooking tasks. No concerns with discharge back home with family at this time.     Pt receives dialysis at [facility] (p: [phone], f: [phone]). SW will fax appropriate discharge paperwork to facility when available.     Will continue to follow.     Plan  SW/CM - Recommendations for Discharge: Home     Anticipate patient will need post-hospital services. Necessary services are available.  Anticipate patient can return to the environment from which patient entered the hospital.   Anticipate patient can provide self-care at discharge.    Refer to SW/CM Flowsheet for Goals and objective data.      no

## 2021-08-26 NOTE — PROGRESS NOTE ADULT - ASSESSMENT
#Acute hypoxic respiratory distress secondary to COVID 19 PNA  COVID Treatment Plan:  - Maintain on airborne isolation.  - Continue with O2 as needed via nasal cannula and up-titrate as needed. If on non-rebreather mask, start continuous oximetry monitoring.  - Obtain daily room air O2 saturations once O2 requirements stabilize.  - Acetaminophen 650 mg PO q4h PRN fever. Limit use of NSAIDs.  - HFA albuterol Q6 hour PRN via MDI. Would avoid nebulized preparations to limit risk of aerosol formation.  - Dexamethasone & Remdesivir continued, first doses in ED  - Labs Testing: Daily or As Needed: CBC w/ diff, CMP; Every 3 days: CRP, Ferritin, Procalcitonin.  - Start pharmacologic DVT PPx: Lovenox 40 mg SQ daily if BMI < 30; Lovenox 40 mg SQ BID if BMI > 30. If CrCl < 15, use heparin. Will consider need for extended prophylaxis prior to discharge based on D-Dimer and calculated VTE risk.  - Goals of Care discussion had with patient/surrogate: full code  - ID consulted  - f/u official CTA     #Hyperglycemia  -on dexamethasone  -f/u HbA1c  -consider carb rest. AISS accucks pending result    #Hypoalbuminemia  -prealbumin  -nutrition consult    #Hypokalemia  -repletion ordered    #HTN/HLD  DASH/TLC  pt does not know name of antiHTN- states will ask family in AM, not available in [facility] or [facility] [place]    #Skin CA  Pt may use own 5FU cream topically as directed Pt is a 70 yo male with a pmh/o HTN, HLD, skin CA admitted for:         #Acute hypoxic respiratory Failure  secondary to COVID 19 PNA  - CTA chest: neg for PE, extensive pulm infiltrates, with RLL nodularity and mediastinal mass, possible Lung  malignancy   COVID Treatment Plan:  - Maintain on airborne isolation.  - Continue with O2 as needed via nasal cannula, titrate PRN to keep P2 sats >92%  - start continuous pulse ox   - Acetaminophen 650 mg PO q4h PRN fever  - HFA albuterol Q6 hour PRN   - started on Dexamethasone & Remdesivir Day 2  -Mucinex  -  trend proinflammatory markers   - D/w DR Oshea  - Pulm eval     # Hyperglycemia  - HB A1c 6.3, pre diabetes   - BS elevated 2/2 dexamethasone  - Monitor BS, cover with ISS       #Hypokalemia  -repleted   - monitor     #HTN  - monitor BP, stable  - resume on amlodipine     # Constipation  Check CT abd to  r/o obstruction  will give Bowel regiment if neg       # Bacteremia  prelim BCX: GR + cocci in aerobic bottle  will give dose of VAnco stat   Will d/w Dr Oshea in am     #Skin CA  Pt may use own 5FU cream topically as directed    # DVT PPX: Lovenox SQ

## 2021-08-26 NOTE — H&P ADULT - HISTORY OF PRESENT ILLNESS
Pt is a 70 yo male with a pmh/o HTN, HLD, skin CA who is a smoker and who quit smoking two weeks ago due to malaise, who presents to ED today due to worsening shortness of breath, cough, body aches and pains, fever, chills. Pt states symptoms have gotten significantly worse over past two days. Pt is not COVID vaccinated. Arrived with oxygen saturation at 86% on RA. Improved to mid 90's on 4L NC. Denies cp, palpitations, n/v/d, abd pain, constipation, rash, leg swelling, calf pain, dysuria, hematuria, sputum production, hemoptysis, HA.

## 2021-08-26 NOTE — DIETITIAN INITIAL EVALUATION ADULT. - PERTINENT LABORATORY DATA
08-26    137  |  108  |  19  ----------------------------<  269<H>  3.6   |  22  |  0.89    Ca    8.3<L>      26 Aug 2021 08:43    TPro  6.1  /  Alb  2.1<L>  /  TBili  0.8  /  DBili  x   /  AST  57<H>  /  ALT  78  /  AlkPhos  133<H>  08-26

## 2021-08-26 NOTE — DIETITIAN INITIAL EVALUATION ADULT. - PERTINENT MEDS FT
MEDICATIONS  (STANDING):  dexAMETHasone  Injectable 6 milliGRAM(s) IV Push daily  enoxaparin Injectable 40 milliGRAM(s) SubCutaneous daily  remdesivir  IVPB 100 milliGRAM(s) IV Intermittent every 24 hours  remdesivir  IVPB   IV Intermittent     MEDICATIONS  (PRN):  acetaminophen   Tablet .. 650 milliGRAM(s) Oral every 4 hours PRN Temp greater or equal to 38C (100.4F), Mild Pain (1 - 3)  ALBUTerol    90 MICROgram(s) HFA Inhaler 2 Puff(s) Inhalation every 4 hours PRN Shortness of Breath and/or Wheezing  benzonatate 100 milliGRAM(s) Oral three times a day PRN Cough  guaifenesin/dextromethorphan Oral Liquid 10 milliLiter(s) Oral every 4 hours PRN Cough  zolpidem 5 milliGRAM(s) Oral at bedtime PRN Insomnia  zolpidem 5 milliGRAM(s) Oral at bedtime PRN Insomnia

## 2021-08-26 NOTE — H&P ADULT - ASSESSMENT
Pt is a 72 yo male admitted due to COVID:    #Acute hypoxic respiratory distress secondary to COVID 19 PNA  COVID Treatment Plan:  - Maintain on airborne isolation.  - Continue with O2 as needed via nasal cannula and up-titrate as needed. If on non-rebreather mask, start continuous oximetry monitoring.  - Obtain daily room air O2 saturations once O2 requirements stabilize.  - Acetaminophen 650 mg PO q4h PRN fever. Limit use of NSAIDs.  - HFA albuterol Q6 hour PRN via MDI. Would avoid nebulized preparations to limit risk of aerosol formation.  - Dexamethasone & Remdesivir continued, first doses in ED  - Labs Testing: Daily or As Needed: CBC w/ diff, CMP; Every 3 days: CRP, Ferritin, Procalcitonin.  - Start pharmacologic DVT PPx: Lovenox 40 mg SQ daily if BMI < 30; Lovenox 40 mg SQ BID if BMI > 30. If CrCl < 15, use heparin. Will consider need for extended prophylaxis prior to discharge based on D-Dimer and calculated VTE risk.  - Goals of Care discussion had with patient/surrogate: full code  - ID consulted  - f/u official CTA     #Hyperglycemia  -on dexamethasone  -f/u HbA1c  -consider carb rest. AISS accucks pending result    #Hypoalbuminemia  -prealbumin  -nutrition consult    #Hypokalemia  -repletion ordered    #HTN/HLD  DASH/TLC  pt does not know name of antiHTN- states will ask family in AM, not available in [facility] or [facility] [place]    #Skin CA  Pt may use own 5FU cream topically as directed

## 2021-08-26 NOTE — H&P ADULT - CONVERSATION DETAILS
Advanced care planning discussed and pt is a full code, accepts intubation and cpr should it be required.

## 2021-08-27 DIAGNOSIS — J44.9 CHRONIC OBSTRUCTIVE PULMONARY DISEASE, UNSPECIFIED: ICD-10-CM

## 2021-08-27 DIAGNOSIS — J96.01 ACUTE RESPIRATORY FAILURE WITH HYPOXIA: ICD-10-CM

## 2021-08-27 DIAGNOSIS — R91.8 OTHER NONSPECIFIC ABNORMAL FINDING OF LUNG FIELD: ICD-10-CM

## 2021-08-27 DIAGNOSIS — U07.1 COVID-19: ICD-10-CM

## 2021-08-27 LAB
ANION GAP SERPL CALC-SCNC: 8 MMOL/L — SIGNIFICANT CHANGE UP (ref 5–17)
BUN SERPL-MCNC: 19 MG/DL — SIGNIFICANT CHANGE UP (ref 7–23)
CALCIUM SERPL-MCNC: 8.4 MG/DL — LOW (ref 8.5–10.1)
CHLORIDE SERPL-SCNC: 106 MMOL/L — SIGNIFICANT CHANGE UP (ref 96–108)
CO2 SERPL-SCNC: 24 MMOL/L — SIGNIFICANT CHANGE UP (ref 22–31)
CREAT SERPL-MCNC: 0.72 MG/DL — SIGNIFICANT CHANGE UP (ref 0.5–1.3)
CULTURE RESULTS: SIGNIFICANT CHANGE UP
GLUCOSE SERPL-MCNC: 143 MG/DL — HIGH (ref 70–99)
HCT VFR BLD CALC: 35.3 % — LOW (ref 39–50)
HGB BLD-MCNC: 12.5 G/DL — LOW (ref 13–17)
MCHC RBC-ENTMCNC: 32.9 PG — SIGNIFICANT CHANGE UP (ref 27–34)
MCHC RBC-ENTMCNC: 35.4 GM/DL — SIGNIFICANT CHANGE UP (ref 32–36)
MCV RBC AUTO: 92.9 FL — SIGNIFICANT CHANGE UP (ref 80–100)
ORGANISM # SPEC MICROSCOPIC CNT: SIGNIFICANT CHANGE UP
ORGANISM # SPEC MICROSCOPIC CNT: SIGNIFICANT CHANGE UP
PLATELET # BLD AUTO: 298 K/UL — SIGNIFICANT CHANGE UP (ref 150–400)
POTASSIUM SERPL-MCNC: 3.9 MMOL/L — SIGNIFICANT CHANGE UP (ref 3.5–5.3)
POTASSIUM SERPL-SCNC: 3.9 MMOL/L — SIGNIFICANT CHANGE UP (ref 3.5–5.3)
RBC # BLD: 3.8 M/UL — LOW (ref 4.2–5.8)
RBC # FLD: 12.6 % — SIGNIFICANT CHANGE UP (ref 10.3–14.5)
SODIUM SERPL-SCNC: 138 MMOL/L — SIGNIFICANT CHANGE UP (ref 135–145)
SPECIMEN SOURCE: SIGNIFICANT CHANGE UP
WBC # BLD: 22.44 K/UL — HIGH (ref 3.8–10.5)
WBC # FLD AUTO: 22.44 K/UL — HIGH (ref 3.8–10.5)

## 2021-08-27 PROCEDURE — 99232 SBSQ HOSP IP/OBS MODERATE 35: CPT

## 2021-08-27 PROCEDURE — 99223 1ST HOSP IP/OBS HIGH 75: CPT

## 2021-08-27 RX ADMIN — Medication 2: at 17:30

## 2021-08-27 RX ADMIN — ALBUTEROL 2 PUFF(S): 90 AEROSOL, METERED ORAL at 12:37

## 2021-08-27 RX ADMIN — REMDESIVIR 540 MILLIGRAM(S): 5 INJECTION INTRAVENOUS at 22:05

## 2021-08-27 RX ADMIN — ALBUTEROL 2 PUFF(S): 90 AEROSOL, METERED ORAL at 22:26

## 2021-08-27 RX ADMIN — AMLODIPINE BESYLATE 5 MILLIGRAM(S): 2.5 TABLET ORAL at 09:23

## 2021-08-27 RX ADMIN — SIMETHICONE 80 MILLIGRAM(S): 80 TABLET, CHEWABLE ORAL at 09:23

## 2021-08-27 RX ADMIN — ZOLPIDEM TARTRATE 5 MILLIGRAM(S): 10 TABLET ORAL at 22:05

## 2021-08-27 RX ADMIN — DULOXETINE HYDROCHLORIDE 60 MILLIGRAM(S): 30 CAPSULE, DELAYED RELEASE ORAL at 09:23

## 2021-08-27 RX ADMIN — Medication 2: at 09:23

## 2021-08-27 RX ADMIN — Medication 100 MILLIGRAM(S): at 09:23

## 2021-08-27 RX ADMIN — Medication 6 MILLIGRAM(S): at 09:24

## 2021-08-27 RX ADMIN — ENOXAPARIN SODIUM 40 MILLIGRAM(S): 100 INJECTION SUBCUTANEOUS at 09:24

## 2021-08-27 NOTE — PROGRESS NOTE ADULT - SUBJECTIVE AND OBJECTIVE BOX
CC: COVID (26 Aug 2021 13:16)    HPI:  Pt is a 72 yo male with a pmh/o HTN, HLD, skin CA who is a smoker and who quit smoking two weeks ago due to malaise, who presents to ED today due to worsening shortness of breath, cough, body aches and pains, fever, chills. Pt states symptoms have gotten significantly worse over past two days. Pt is not COVID vaccinated. Arrived with oxygen saturation at 86% on RA. Improved to mid 90's on 4L NC. Denies cp, palpitations, n/v/d, abd pain, constipation, rash, leg swelling, calf pain, dysuria, hematuria, sputum production, hemoptysis, HA.      INTERVAL HPI/ OVERNIGHT EVENTS: chart reviewed, Pt was seen and examined, reports feeling  breathing better on O2, c/p abd pain, constipation, but was able to have small BM today. Also mild nausea     Vital Signs Last 24 Hrs  T(C): 36.7 (26 Aug 2021 12:15), Max: 37.9 (25 Aug 2021 19:59)  T(F): 98 (26 Aug 2021 12:15), Max: 100.3 (25 Aug 2021 19:59)  HR: 88 (26 Aug 2021 12:15) (70 - 88)  BP: 129/77 (26 Aug 2021 12:15) (113/61 - 131/76)  BP(mean): 78 (26 Aug 2021 05:01) (78 - 79)  RR: 18 (26 Aug 2021 12:15) (17 - 24)  SpO2: 91% (26 Aug 2021 12:15) (86% - 93%)      REVIEW OF SYSTEMS:  All other review of systems is negative unless indicated above.      PHYSICAL EXAM:  General: Well developed: looks acutely ill, mildly dyspneic on conversation, on NC  Eyes: EOMI; conjunctiva and sclera clear  Head: Normocephalic; atraumatic  ENMT: No nasal discharge; airway clear  Neck: Supple; non tender; no masses  Respiratory: Diminished BS,  No wheezes, rales or rhonchi  Cardiovascular: Regular rate and rhythm. S1 and S2 Normal;   Gastrointestinal: Soft , mildly distended, mildly tender at L lower abd, + bowel sounds  Genitourinary: No  suprapubic  tenderness  Extremities: No LE  edema  Vascular: Peripheral pulses palpable 2+ bilaterally  Neurological: Alert and oriented x3, non focal   Musculoskeletal: Normal muscle tone and strength   Psychiatric: Cooperative        LABS:   CARDIAC MARKERS ( 25 Aug 2021 20:49 )  <0.015 ng/mL / x     / 78 U/L / x     / x                                12.6   8.64  )-----------( 230      ( 26 Aug 2021 08:43 )             36.2     26 Aug 2021 08:43    137    |  108    |  19     ----------------------------<  269    3.6     |  22     |  0.89     Ca    8.3        26 Aug 2021 08:43    TPro  6.1    /  Alb  2.1    /  TBili  0.8    /  DBili  x      /  AST  57     /  ALT  78     /  AlkPhos  133    26 Aug 2021 08:43    PT/INR - ( 25 Aug 2021 20:49 )   PT: 14.0 sec;   INR: 1.22 ratio    PTT - ( 25 Aug 2021 20:49 )  PTT:25.0 sec      LIVER FUNCTIONS - ( 26 Aug 2021 08:43 )  Alb: 2.1 g/dL / Pro: 6.1 gm/dL / ALK PHOS: 133 U/L / ALT: 78 U/L / AST: 57 U/L / GGT: x               MEDICATIONS  (STANDING):  dexAMETHasone  Injectable 6 milliGRAM(s) IV Push daily  DULoxetine 60 milliGRAM(s) Oral daily  enoxaparin Injectable 40 milliGRAM(s) SubCutaneous daily  remdesivir  IVPB 100 milliGRAM(s) IV Intermittent every 24 hours  remdesivir  IVPB   IV Intermittent     MEDICATIONS  (PRN):  acetaminophen   Tablet .. 650 milliGRAM(s) Oral every 4 hours PRN Temp greater or equal to 38C (100.4F), Mild Pain (1 - 3)  ALBUTerol    90 MICROgram(s) HFA Inhaler 2 Puff(s) Inhalation every 4 hours PRN Shortness of Breath and/or Wheezing  benzonatate 100 milliGRAM(s) Oral three times a day PRN Cough  guaifenesin/dextromethorphan Oral Liquid 10 milliLiter(s) Oral every 4 hours PRN Cough  zolpidem 5 milliGRAM(s) Oral at bedtime PRN Insomnia  zolpidem 5 milliGRAM(s) Oral at bedtime PRN Insomnia      RADIOLOGY & ADDITIONAL TESTS:  < from: CT Angio Chest PE Protocol w/ IV Cont (08.25.21 @ 22:53) >    EXAM:  CT ANGIO CHEST PULM [facility]ON[facility] GEORGE                            PROCEDURE DATE:  08/25/2021          INTERPRETATION:  CLINICAL INFORMATION: COVID positive.  Positive d-dimer.    COMPARISON: 11/16/2012    CONTRAST/COMPLICATIONS:  IV Contrast: Omnipaque 350  90 cc administered   10 cc discarded  Oral Contrast: NONE  Complications: None reported at time of study completion    PROCEDURE:  CT Angiography of the Chest.  Sagittal and coronal reformats were performed as well as 3D (MIP) reconstructions.    FINDINGS:    LUNGS AND AIRWAYS: Patent central airways.  There is severe patchy and partially confluent groundglass opacity in both lungs with patchy areas of more dense airspace disease.  A 2.5 x 2 cm right lower lobe nodular opacity appears more nodular and solid  PLEURA: Small pleural effusions bilaterally.  MEDIASTINUM AND DONAVON: There are numerous mediastinal and hilar lymph nodes measuring up to approximately 1.5 cm short access in the subcarinal region (2:58) and 1.4 cm short access in the left hilum (2:51).  There is confluent soft tissue in the right hilum, surrounding the right upper lobe bronchus (2:45; there is no narrowing of the airway.  VESSELS: No pulmonary embolism.  Mild atherosclerosis of the visualized aorta and branch vessels.  HEART: The heart appears mildly enlarged.  Mild to moderate atherosclerotic calcification of the coronary arteries. No pericardial effusion.  CHEST WALL AND LOWER NECK: Within normal limits.  VISUALIZED UPPER ABDOMEN: Small hiatal hernia.  Enlarged spleen measures up to 15.3 cm in the axial plane (2:114.  Multiple left upper pole renal cysts are noted.  BONES: Degenerative changes in the spine.    IMPRESSION:  No pulmonary embolism.    Severe patchy and partially confluent groundglass opacity in both lungs with patchy areas of more dense airspace disease is compatible with with COVID-19 pneumonia.    Small pleural effusions bilaterally.    A 2.5 x 2 cm right lower lobe nodular opacity appears more nodular and solid.  Numerous mediastinal and hilar lymph nodes measure up to 1.5 cm short axis in subcarina region and 1.4 cm short axis in the left hilum.  There is confluent soft tissue in the right hilum, surrounding the right upper lobe bronchus without associated airway narrowing.Underlying malignancy/lung cancer is not excluded.  Repeat chest CT scan is recommended in approximately one month after therapy and resolution of COVID-19 pneumonia two reassess the lung findings.

## 2021-08-27 NOTE — PROGRESS NOTE ADULT - ASSESSMENT
70 y/o male with h/o HTN, HLD, skin CA was admitted on 8/25 for worsening shortness of breath, cough, body aches and pains, fever, chills. Pt states symptoms have gotten significantly worse over past two days PTA. In ER, he arrived with oxygen saturation at 86% on RA. Improved to mid 90's on 4L NC. Denies fever or chills at home.     1. COVID-19 viral syndrome. Multifocal pneumonia. Acute respiratory failure. Allergy to PCN.   -febrile syndrome  -respiratory frail  -on remdesivir protocol # 2  -tolerating abx well so far; no side effects noted  -O2 therapy  -steroids  -AC  -droplet isolation  -respiratory care  -concern for pulmonary malignancy reported by radiologist; will need f/u as outpatient  -continue antiviral therapy  -monitor temps  -f/u CBC  -supportive care  2. Other issues:   -care per medicine

## 2021-08-27 NOTE — CONSULT NOTE ADULT - SUBJECTIVE AND OBJECTIVE BOX
Patient is a 71y old  Male who presents with a chief complaint of COVID    HPI:  70 y/o male with h/o HTN, HLD, skin CA was admitted on  for worsening shortness of breath, cough, body aches and pains, fever, chills. Pt states symptoms have gotten significantly worse over past two days PTA. In ER, he arrived with oxygen saturation at 86% on RA. Improved to mid 90's on 4L NC. Denies fever or chills at home.     Pt is not COVID vaccinated.       PMH: as above  PSH: as above  Meds: per reconciliation sheet, noted below  MEDICATIONS  (STANDING):  dexAMETHasone  Injectable 6 milliGRAM(s) IV Push daily  enoxaparin Injectable 40 milliGRAM(s) SubCutaneous daily  remdesivir  IVPB 100 milliGRAM(s) IV Intermittent every 24 hours  remdesivir  IVPB   IV Intermittent     MEDICATIONS  (PRN):  acetaminophen   Tablet .. 650 milliGRAM(s) Oral every 4 hours PRN Temp greater or equal to 38C (100.4F), Mild Pain (1 - 3)  ALBUTerol    90 MICROgram(s) HFA Inhaler 2 Puff(s) Inhalation every 4 hours PRN Shortness of Breath and/or Wheezing  benzonatate 100 milliGRAM(s) Oral three times a day PRN Cough  guaifenesin/dextromethorphan Oral Liquid 10 milliLiter(s) Oral every 4 hours PRN Cough  zolpidem 5 milliGRAM(s) Oral at bedtime PRN Insomnia  zolpidem 5 milliGRAM(s) Oral at bedtime PRN Insomnia    Allergies    penicillin (Unknown)    Intolerances      Social: smoker and who quit smoking two weeks ago, no alcohol, no illegal drugs; no recent travel, no exposure to TB  FAMILY HISTORY:  FH: Parkinson;s disease (Father, Mother)      no history of premature cardiovascular disease in first degree relatives    ROS: the patient denies HA, no seizures, no dizziness, no sore throat, no nasal congestion, no blurry vision, no CP, no palpitations, has SOB, has cough, no abdominal pain, no diarrhea, no N/V, no dysuria, no leg pain, no claudication, no rash, no joint aches, no rectal pain or bleeding, no night sweats  All other systems reviewed and are negative    Vital Signs Last 24 Hrs  T(C): 36.6 (26 Aug 2021 06:02), Max: 37.9 (25 Aug 2021 19:59)  T(F): 97.9 (26 Aug 2021 06:02), Max: 100.3 (25 Aug 2021 19:59)  HR: 79 (26 Aug 2021 06:02) (70 - 87)  BP: 131/76 (26 Aug 2021 06:02) (113/61 - 131/76)  BP(mean): 78 (26 Aug 2021 05:01) (78 - 79)  RR: 20 (26 Aug 2021 06:02) (17 - 24)  SpO2: 92% (26 Aug 2021 06:02) (86% - 93%)  Daily Height in cm: 182.88 (25 Aug 2021 19:59)    Daily Weight in k.6 (26 Aug 2021 06:02)    PE:    Constitutional:  No acute distress  HEENT: NC/AT, EOMI, PERRLA, conjunctivae clear; ears and nose atraumatic; pharynx benign  Neck: supple; thyroid not palpable  Back: no tenderness  Respiratory: respiratory effort normal; crackles at bases  Cardiovascular: S1S2 regular, no murmurs  Abdomen: soft, not tender, not distended, positive BS; no liver or spleen organomegaly  Genitourinary: no suprapubic tenderness  Lymphatic: no LN palpable  Musculoskeletal: no muscle tenderness, no joint swelling or tenderness  Extremities: no pedal edema  Neurological/ Psychiatric: AxOx3, judgement and insight normal; moving all extremities  Skin: no rashes; no palpable lesions    Labs: all available labs reviewed                        12.6   8.64  )-----------( 230      ( 26 Aug 2021 08:43 )             36.2         137  |  108  |  19  ----------------------------<  269<H>  3.6   |  22  |  0.89    Ca    8.3<L>      26 Aug 2021 08:43    TPro  6.1  /  Alb  2.1<L>  /  TBili  0.8  /  DBili  x   /  AST  57<H>  /  ALT  78  /  AlkPhos  133<H>       LIVER FUNCTIONS - ( 26 Aug 2021 08:43 )  Alb: 2.1 g/dL / Pro: 6.1 gm/dL / ALK PHOS: 133 U/L / ALT: 78 U/L / AST: 57 U/L / GGT: x           COVID ( @ 20:49)  Detected      Radiology: all available radiological tests reviewed    < from: CT Angio Chest PE Protocol w/ IV Cont (21 @ 22:53) >  No pulmonary embolism.  Severe patchy and partially confluent groundglass opacity in both lungs with patchy areas of more dense airspace disease is compatible with with COVID-19 pneumonia.  Small pleural effusions bilaterally.  A 2.5 x 2 cm right lower lobe nodular opacity appears more nodular and solid.  Numerous mediastinal and hilar lymph nodes measure up to 1.5 cm short axis in subcarina region and 1.4 cm short axis in the left hilum.  There is confluent soft tissue in the right hilum, surrounding the right upper lobe bronchus without associated airway narrowing.Underlying malignancy/lung cancer is not excluded.  Repeat chest CT scan is recommended in approximately one month after therapy and resolution of COVID-19 pneumonia two reassess the lung findings.    < end of copied text >      Advanced directives addressed: full resuscitation
  HISTORY OF PRESENT ILLNESS:   71 year-old man, active smoker, presents with shortness of breath cough and fever, found to have COVID pneumonia. He presented to the ED for significantly worsening symptoms over the past two days. In the ED he was found to be hypoxemia, and placed on NC. HE was admitted and managed with remdesivir and decadron He states he has a history of COPD, diagnosed in 2005, he is overall well controlled and only on albuterol as needed. He normally has good ET. He has cough, with sputum production, with off colored sputum. He has no chest pain or wheezing. No hemoptysis. No nausea, vomiting or diarrhea.     ROS otherwise negative.     REPAST MEDICAL & PAST SURGICAL HISTORY:  ·	Hypertension  ·	Skin cancer  ·	HLD (hyperlipidemia)  ·	H/O vertigo  ·	H/O knee surgery  ·	S/P skin cancer resection        SOCIAL HISTORY:   He is an active smoker, currently smoked electronic cigarettes, previosly smoked combustible cigarettes, he has smoked on and off for 50 years.    FAMILY HISTORY:  No family history of lung disorders.     MEDICATIONS  (STANDING):  amLODIPine   Tablet 5 milliGRAM(s) Oral daily  dexAMETHasone  Injectable 6 milliGRAM(s) IV Push daily  dextrose 40% Gel 15 Gram(s) Oral once  dextrose 5%. 1000 milliLiter(s) (50 mL/Hr) IV Continuous <Continuous>  dextrose 5%. 1000 milliLiter(s) (100 mL/Hr) IV Continuous <Continuous>  dextrose 50% Injectable 25 Gram(s) IV Push once  dextrose 50% Injectable 12.5 Gram(s) IV Push once  dextrose 50% Injectable 25 Gram(s) IV Push once  DULoxetine 60 milliGRAM(s) Oral daily  enoxaparin Injectable 40 milliGRAM(s) SubCutaneous daily  glucagon  Injectable 1 milliGRAM(s) IntraMuscular once  insulin lispro (ADMELOG) corrective regimen sliding scale   SubCutaneous three times a day before meals  insulin lispro (ADMELOG) corrective regimen sliding scale   SubCutaneous at bedtime  remdesivir  IVPB 100 milliGRAM(s) IV Intermittent every 24 hours  remdesivir  IVPB   IV Intermittent     MEDICATIONS  (PRN):  acetaminophen   Tablet .. 650 milliGRAM(s) Oral every 4 hours PRN Temp greater or equal to 38C (100.4F), Mild Pain (1 - 3)  ALBUTerol    90 MICROgram(s) HFA Inhaler 2 Puff(s) Inhalation every 4 hours PRN Shortness of Breath and/or Wheezing  benzonatate 100 milliGRAM(s) Oral three times a day PRN Cough  guaifenesin/dextromethorphan Oral Liquid 10 milliLiter(s) Oral every 4 hours PRN Cough  ondansetron Injectable 4 milliGRAM(s) IV Push every 8 hours PRN Nausea and/or Vomiting  simethicone 80 milliGRAM(s) Chew four times a day PRN Gas  zolpidem 5 milliGRAM(s) Oral at bedtime PRN Insomnia  zolpidem 5 milliGRAM(s) Oral at bedtime PRN Insomnia      Allergies  penicillin (Unknown)      Vital Signs Last 24 Hrs  T(C): 36.5 (27 Aug 2021 09:21), Max: 36.7 (26 Aug 2021 12:15)  T(F): 97.7 (27 Aug 2021 09:21), Max: 98 (26 Aug 2021 12:15)  HR: 67 (27 Aug 2021 09:21) (67 - 88)  BP: 126/67 (27 Aug 2021 09:21) (126/67 - 150/81)  BP(mean): 85 (27 Aug 2021 09:21) (85 - 85)  RR: 18 (26 Aug 2021 12:15) (18 - 18)  SpO2: 92% (27 Aug 2021 09:21) (91% - 92%)      PHYSICAL EXAMINATION:  GENERAL APPEARANCE:  No distress. No accessory muscle use. Slight incrased resp. effort with long senances.   HEAD: Normocephalic atraumatic   EYES: Pupils are normal. No icterus. Sclera white.   HEENT:  Mucus membranes moist. Oropharynx normal.   NECK:  Supple. No JVD.   HEART:  Normal S1 and S2. There are no murmurs appreciarted.   CHEST:  Clear to ausculation bilaterally. Normal chest excursion. No wheezing. No crackles. No rhonchi   ABDOMEN:  Soft and nontender. Nondistended.   EXTREMITIES:  There is no cyanosis, clubbing or edema.   SKIN:  No rash or significant lesions are noted. No jaundice.  PSYCH: Normal affect.  NEURO: Alert and oriented. Responds to question appropriate. No focal deficits appreciated.       LABS:                        12.5   22.44 )-----------( 298      ( 27 Aug 2021 08:53 )             35.3     08-27    138  |  106  |  19  ----------------------------<  143<H>  3.9   |  24  |  0.72      RADIOLOGY & ADDITIONAL STUDIES:   *images personally reviewed.     CT Chest 8/25/21 -   IMPRESSION:  No pulmonary embolism.  Severe patchy and partially confluent groundglass opacity in both lungs with patchy areas of more dense airspace disease is compatible with with COVID-19 pneumonia.  Small pleural effusions bilaterally.  A 2.5 x 2 cm right lower lobe nodular opacity appears more nodular and solid. Numerous mediastinal and hilar lymph nodes measure up to 1.5 cm short axis in subcarina region and 1.4 cm short axis in the left hilum. There is confluent soft tissue in the right hilum, surrounding the right upper lobe bronchus without associated airway narrowing. Underlying malignancy/lung cancer is not excluded. Repeat chest CT scan is recommended in approximately one month after therapy and resolution of COVID-19 pneumonia two reassess the lung findings.      
chills

## 2021-08-27 NOTE — PROGRESS NOTE ADULT - SUBJECTIVE AND OBJECTIVE BOX
Date of service: 08-27-21 @ 12:49    Lying in bed in NAD  Has mild SOB at rest  Has dry cough    ROS: no fever or chills; denies dizziness, no HA, no abdominal pain, no diarrhea or constipation; no dysuria, no legs pain, no rashes    MEDICATIONS  (STANDING):  amLODIPine   Tablet 5 milliGRAM(s) Oral daily  dexAMETHasone  Injectable 6 milliGRAM(s) IV Push daily  dextrose 40% Gel 15 Gram(s) Oral once  dextrose 5%. 1000 milliLiter(s) (50 mL/Hr) IV Continuous <Continuous>  dextrose 5%. 1000 milliLiter(s) (100 mL/Hr) IV Continuous <Continuous>  dextrose 50% Injectable 12.5 Gram(s) IV Push once  dextrose 50% Injectable 25 Gram(s) IV Push once  dextrose 50% Injectable 25 Gram(s) IV Push once  DULoxetine 60 milliGRAM(s) Oral daily  enoxaparin Injectable 40 milliGRAM(s) SubCutaneous daily  glucagon  Injectable 1 milliGRAM(s) IntraMuscular once  insulin lispro (ADMELOG) corrective regimen sliding scale   SubCutaneous three times a day before meals  insulin lispro (ADMELOG) corrective regimen sliding scale   SubCutaneous at bedtime  remdesivir  IVPB 100 milliGRAM(s) IV Intermittent every 24 hours  remdesivir  IVPB   IV Intermittent     Vital Signs Last 24 Hrs  T(C): 36.5 (27 Aug 2021 09:21), Max: 36.6 (26 Aug 2021 20:10)  T(F): 97.7 (27 Aug 2021 09:21), Max: 97.9 (26 Aug 2021 20:10)  HR: 67 (27 Aug 2021 09:21) (67 - 77)  BP: 126/67 (27 Aug 2021 09:21) (126/67 - 150/81)  BP(mean): 85 (27 Aug 2021 09:21) (85 - 85)  RR: --  SpO2: 92% (27 Aug 2021 09:21) (92% - 92%)     Physical exam:    Constitutional:  No acute distress  HEENT: NC/AT, EOMI, PERRLA, conjunctivae clear  Neck: supple; thyroid not palpable  Back: no tenderness  Respiratory: respiratory effort normal; crackles at bases  Cardiovascular: S1S2 regular, no murmurs  Abdomen: soft, not tender, not distended, positive BS  Genitourinary: no suprapubic tenderness  Lymphatic: no LN palpable  Musculoskeletal: no muscle tenderness, no joint swelling or tenderness  Extremities: no pedal edema  Neurological/ Psychiatric: AxOx3, moving all extremities  Skin: no rashes; no palpable lesions    Labs: reviewed                        12.5 22.44 )-----------( 298      ( 27 Aug 2021 08:53 )             35.3     08-27    138  |  106  |  19  ----------------------------<  143<H>  3.9   |  24  |  0.72    Ca    8.4<L>      27 Aug 2021 08:53    TPro  6.1  /  Alb  2.1<L>  /  TBili  0.8  /  DBili  x   /  AST  57<H>  /  ALT  78  /  AlkPhos  133<H>  08-26    C-Reactive Protein, Serum: 224 mg/L (08-25-21 @ 20:49)  D-Dimer Assay, Quantitative: 1819 ng/mL DDU (08-25-21 @ 20:49)  Ferritin, Serum: 1675 ng/mL (08-25-21 @ 20:49)                        12.6   8.64  )-----------( 230      ( 26 Aug 2021 08:43 )             36.2     08-26    137  |  108  |  19  ----------------------------<  269<H>  3.6   |  22  |  0.89    Ca    8.3<L>      26 Aug 2021 08:43    TPro  6.1  /  Alb  2.1<L>  /  TBili  0.8  /  DBili  x   /  AST  57<H>  /  ALT  78  /  AlkPhos  133<H>  08-26     LIVER FUNCTIONS - ( 26 Aug 2021 08:43 )  Alb: 2.1 g/dL / Pro: 6.1 gm/dL / ALK PHOS: 133 U/L / ALT: 78 U/L / AST: 57 U/L / GGT: x           COVID (08-25 @ 20:49)  Detected      Radiology: all available radiological tests reviewed    < from: CT Angio Chest PE Protocol w/ IV Cont (08.25.21 @ 22:53) >  No pulmonary embolism.  Severe patchy and partially confluent groundglass opacity in both lungs with patchy areas of more dense airspace disease is compatible with with COVID-19 pneumonia.  Small pleural effusions bilaterally.  A 2.5 x 2 cm right lower lobe nodular opacity appears more nodular and solid.  Numerous mediastinal and hilar lymph nodes measure up to 1.5 cm short axis in subcarina region and 1.4 cm short axis in the left hilum.  There is confluent soft tissue in the right hilum, surrounding the right upper lobe bronchus without associated airway narrowing.Underlying malignancy/lung cancer is not excluded.  Repeat chest CT scan is recommended in approximately one month after therapy and resolution of COVID-19 pneumonia two reassess the lung findings.    < end of copied text >      Advanced directives addressed: full resuscitation

## 2021-08-27 NOTE — PROGRESS NOTE ADULT - ASSESSMENT
Pt is a 70 yo male with a pmh/o HTN, HLD, skin CA admitted for:     #Acute hypoxic respiratory Failure  secondary to COVID 19 PNA  - CTA chest: neg for PE, extensive pulm infiltrates, with RLL nodularity and mediastinal mass, possible Lung  malignancy   COVID Treatment Plan:  - Maintain on airborne isolation.  - Continue with O2 as needed via nasal cannula, titrate PRN to keep P2 sats >92%  - start continuous pulse ox   - Acetaminophen 650 mg PO q4h PRN fever  - HFA albuterol Q6 hour PRN   - cont. Dexamethasone & Remdesivir   -Mucinex  -  trend proinflammatory markers   - D/w DR Oshea  - Pulm eval     # Leukocytosis   - due to viral infection and steroids  - monitor WBC     # Nodular opacity in RLL with mediastinal adenopathy  concern for malignancy  evaluate when covid PNA improves - d/w pulmonology -   will need repeat CT Chest approx 4-6 weeks after this CT chest to follow up these abnormalities once the acute infection has improved.     # Hyperglycemia  - HB A1c 6.3, pre diabetes   - BS elevated 2/2 dexamethasone  - Monitor BS, cover with ISS     #Hypokalemia - resolved   -repleted   - monitor     #HTN  - monitor BP, stable  - resume on amlodipine     # Constipation  CT abdomen - no obstruction   will give Bowel regiment if neg       # Bacteremia  prelim BCX: GR + cocci in aerobic bottle  will give dose of VAnco stat   Will d/w Dr Oshea in am     #Skin CA  Pt may use own 5FU cream topically as directed    # DVT PPX: Lovenox SQ

## 2021-08-27 NOTE — CONSULT NOTE ADULT - ASSESSMENT
70 y/o male with h/o HTN, HLD, skin CA was admitted on 8/25 for worsening shortness of breath, cough, body aches and pains, fever, chills. Pt states symptoms have gotten significantly worse over past two days PTA. In ER, he arrived with oxygen saturation at 86% on RA. Improved to mid 90's on 4L NC. Denies fever or chills at home.     1. COVID-19 viral syndrome. Multifocal pneumonia. Acute respiratory failure. Allergy to PCN.   -febrile syndrome  -respiratory frail  -start remdesivir protocol  -remdesivir risks and benefits reviewed with patient and he agreed with the use of the antiviral medication  -O2 therapy  -steroids  -AC  -droplet isolation  -respiratory care  -concern for pulmonary malignancy reported by radiologist; will need f/u as outpatient  -old chart reviewed to assess prior cultures  -monitor temps  -f/u CBC  -supportive care  2. Other issues:   -care per medicine      
71 year-old man, active smoker, presents with fever, cough, shortness of breath, found to have hypoxemia and infiltrates on imaging consistent with COVID pneumonia.   --CT chest has dense ground glass opacities diffusely, likely related to COVID. Noted by radiology is nodular opacity in RLL and mediastinal adenopathy - this may represent malignancy but given acute infiltrates I am not able to see what is acute from chronic.     Recommendations:  --c/w treatment for COVID with decadron, remdesivir, and supportive care.  --check sputum culture if possible  --He will need repeat CT Chest approx 4-6 weeks after this CT chest to follow up these abnormalities once the acute infection has improved.     Will follow.

## 2021-08-28 LAB
ALBUMIN SERPL ELPH-MCNC: 2.4 G/DL — LOW (ref 3.3–5)
ALP SERPL-CCNC: 167 U/L — HIGH (ref 40–120)
ALT FLD-CCNC: 139 U/L — HIGH (ref 12–78)
ANION GAP SERPL CALC-SCNC: 7 MMOL/L — SIGNIFICANT CHANGE UP (ref 5–17)
APPEARANCE UR: CLEAR — SIGNIFICANT CHANGE UP
APTT BLD: 25.1 SEC — LOW (ref 27.5–35.5)
AST SERPL-CCNC: 91 U/L — HIGH (ref 15–37)
BILIRUB SERPL-MCNC: 0.8 MG/DL — SIGNIFICANT CHANGE UP (ref 0.2–1.2)
BILIRUB UR-MCNC: NEGATIVE — SIGNIFICANT CHANGE UP
BUN SERPL-MCNC: 21 MG/DL — SIGNIFICANT CHANGE UP (ref 7–23)
CALCIUM SERPL-MCNC: 7.7 MG/DL — LOW (ref 8.5–10.1)
CHLORIDE SERPL-SCNC: 102 MMOL/L — SIGNIFICANT CHANGE UP (ref 96–108)
CO2 SERPL-SCNC: 24 MMOL/L — SIGNIFICANT CHANGE UP (ref 22–31)
COLOR SPEC: YELLOW — SIGNIFICANT CHANGE UP
CREAT SERPL-MCNC: 0.78 MG/DL — SIGNIFICANT CHANGE UP (ref 0.5–1.3)
D DIMER BLD IA.RAPID-MCNC: 5563 NG/ML DDU — HIGH
DIFF PNL FLD: NEGATIVE — SIGNIFICANT CHANGE UP
GLUCOSE SERPL-MCNC: 124 MG/DL — HIGH (ref 70–99)
GLUCOSE UR QL: NEGATIVE MG/DL — SIGNIFICANT CHANGE UP
GRAM STN FLD: SIGNIFICANT CHANGE UP
HCT VFR BLD CALC: 36.9 % — LOW (ref 39–50)
HGB BLD-MCNC: 13.1 G/DL — SIGNIFICANT CHANGE UP (ref 13–17)
INR BLD: 1.22 RATIO — HIGH (ref 0.88–1.16)
KETONES UR-MCNC: NEGATIVE — SIGNIFICANT CHANGE UP
LEUKOCYTE ESTERASE UR-ACNC: NEGATIVE — SIGNIFICANT CHANGE UP
MCHC RBC-ENTMCNC: 33 PG — SIGNIFICANT CHANGE UP (ref 27–34)
MCHC RBC-ENTMCNC: 35.5 GM/DL — SIGNIFICANT CHANGE UP (ref 32–36)
MCV RBC AUTO: 92.9 FL — SIGNIFICANT CHANGE UP (ref 80–100)
NITRITE UR-MCNC: NEGATIVE — SIGNIFICANT CHANGE UP
NT-PROBNP SERPL-SCNC: 3151 PG/ML — HIGH (ref 0–125)
PH UR: 6.5 — SIGNIFICANT CHANGE UP (ref 5–8)
PLATELET # BLD AUTO: 225 K/UL — SIGNIFICANT CHANGE UP (ref 150–400)
POTASSIUM SERPL-MCNC: 3.9 MMOL/L — SIGNIFICANT CHANGE UP (ref 3.5–5.3)
POTASSIUM SERPL-SCNC: 3.9 MMOL/L — SIGNIFICANT CHANGE UP (ref 3.5–5.3)
PROT SERPL-MCNC: 5.9 GM/DL — LOW (ref 6–8.3)
PROT UR-MCNC: 15 MG/DL
PROTHROM AB SERPL-ACNC: 14.1 SEC — HIGH (ref 10.6–13.6)
RBC # BLD: 3.97 M/UL — LOW (ref 4.2–5.8)
RBC # FLD: 12.7 % — SIGNIFICANT CHANGE UP (ref 10.3–14.5)
SODIUM SERPL-SCNC: 133 MMOL/L — LOW (ref 135–145)
SP GR SPEC: 1.01 — SIGNIFICANT CHANGE UP (ref 1.01–1.02)
SPECIMEN SOURCE: SIGNIFICANT CHANGE UP
UROBILINOGEN FLD QL: 4 MG/DL
WBC # BLD: 17.99 K/UL — HIGH (ref 3.8–10.5)
WBC # FLD AUTO: 17.99 K/UL — HIGH (ref 3.8–10.5)

## 2021-08-28 PROCEDURE — 99233 SBSQ HOSP IP/OBS HIGH 50: CPT

## 2021-08-28 PROCEDURE — 99291 CRITICAL CARE FIRST HOUR: CPT

## 2021-08-28 RX ORDER — HYDROCORTISONE 1 %
1 OINTMENT (GRAM) TOPICAL ONCE
Refills: 0 | Status: COMPLETED | OUTPATIENT
Start: 2021-08-28 | End: 2021-08-28

## 2021-08-28 RX ADMIN — Medication 6 MILLIGRAM(S): at 12:01

## 2021-08-28 RX ADMIN — ENOXAPARIN SODIUM 40 MILLIGRAM(S): 100 INJECTION SUBCUTANEOUS at 12:02

## 2021-08-28 RX ADMIN — AMLODIPINE BESYLATE 5 MILLIGRAM(S): 2.5 TABLET ORAL at 12:02

## 2021-08-28 RX ADMIN — DULOXETINE HYDROCHLORIDE 60 MILLIGRAM(S): 30 CAPSULE, DELAYED RELEASE ORAL at 12:02

## 2021-08-28 RX ADMIN — Medication 2: at 17:43

## 2021-08-28 RX ADMIN — ZOLPIDEM TARTRATE 5 MILLIGRAM(S): 10 TABLET ORAL at 22:08

## 2021-08-28 RX ADMIN — Medication 1 SUPPOSITORY(S): at 23:27

## 2021-08-28 RX ADMIN — REMDESIVIR 540 MILLIGRAM(S): 5 INJECTION INTRAVENOUS at 22:09

## 2021-08-28 NOTE — PROVIDER CONTACT NOTE (OTHER) - ACTION/TREATMENT ORDERED:
MD made aware and instructed to prone; give 15 min to allow for improvement.  If no improvement, uptitrate HFNC.

## 2021-08-28 NOTE — PROGRESS NOTE ADULT - SUBJECTIVE AND OBJECTIVE BOX
SUBJECTIVE:  Overnight worsening oxygen requirements.   Today on NRB oxgyen saturation is 91%  He is overall unchanged still with shortness of breath and cough.  Reports not having restful sleep.   Also with nasal congestion.    ROS otherwise negative.     PHYSICAL EXAMINATION:  GENERAL APPEARANCE:  No distress. No accessory muscle use. Slight increased resp. effort with long sentences   HEAD: Normocephalic atraumatic   EYES: Pupils are normal. No icterus. Sclera white.   HEENT:  Mucus membranes moist. Oropharynx normal.   NECK:  Supple. No JVD.   HEART:  Normal S1 and S2. There are no murmurs appreciated   CHEST:  Clear to ausculation bilaterally. Normal chest excursion. No wheezing. No crackles. No rhonchi   ABDOMEN:  Soft and nontender. Nondistended.   EXTREMITIES:  There is no cyanosis, clubbing or edema.   SKIN:  No rash or significant lesions are noted. No jaundice.  PSYCH: Normal affect.  NEURO: Alert and oriented. Responds to question appropriate. No focal deficits appreciated.     Vital Signs Last 24 Hrs  T(C): 36.4 (28 Aug 2021 08:40), Max: 36.6 (27 Aug 2021 23:46)  T(F): 97.6 (28 Aug 2021 08:40), Max: 97.9 (27 Aug 2021 23:46)  HR: 74 (28 Aug 2021 08:40) (74 - 83)  BP: 146/72 (28 Aug 2021 08:40) (131/61 - 146/72)  BP(mean): 94 (28 Aug 2021 08:40) (94 - 94)  RR: 18 (28 Aug 2021 01:53) (17 - 18)  SpO2: 88% (28 Aug 2021 08:40) (88% - 92%)    MEDICATIONS  (STANDING):  amLODIPine   Tablet 5 milliGRAM(s) Oral daily  dexAMETHasone  Injectable 6 milliGRAM(s) IV Push daily  dextrose 40% Gel 15 Gram(s) Oral once  dextrose 5%. 1000 milliLiter(s) (50 mL/Hr) IV Continuous <Continuous>  dextrose 5%. 1000 milliLiter(s) (100 mL/Hr) IV Continuous <Continuous>  dextrose 50% Injectable 25 Gram(s) IV Push once  dextrose 50% Injectable 12.5 Gram(s) IV Push once  dextrose 50% Injectable 25 Gram(s) IV Push once  DULoxetine 60 milliGRAM(s) Oral daily  enoxaparin Injectable 40 milliGRAM(s) SubCutaneous daily  glucagon  Injectable 1 milliGRAM(s) IntraMuscular once  insulin lispro (ADMELOG) corrective regimen sliding scale   SubCutaneous three times a day before meals  insulin lispro (ADMELOG) corrective regimen sliding scale   SubCutaneous at bedtime  remdesivir  IVPB 100 milliGRAM(s) IV Intermittent every 24 hours  remdesivir  IVPB   IV Intermittent     MEDICATIONS  (PRN):  acetaminophen   Tablet .. 650 milliGRAM(s) Oral every 4 hours PRN Temp greater or equal to 38C (100.4F), Mild Pain (1 - 3)  ALBUTerol    90 MICROgram(s) HFA Inhaler 2 Puff(s) Inhalation every 4 hours PRN Shortness of Breath and/or Wheezing  benzonatate 100 milliGRAM(s) Oral three times a day PRN Cough  guaifenesin/dextromethorphan Oral Liquid 10 milliLiter(s) Oral every 4 hours PRN Cough  ondansetron Injectable 4 milliGRAM(s) IV Push every 8 hours PRN Nausea and/or Vomiting  simethicone 80 milliGRAM(s) Chew four times a day PRN Gas  zolpidem 5 milliGRAM(s) Oral at bedtime PRN Insomnia  zolpidem 5 milliGRAM(s) Oral at bedtime PRN Insomnia      Allergies    penicillin (Unknown)    Intolerances          LABS:                        13.1   17.99 )-----------( 225      ( 28 Aug 2021 08:21 )             36.9     08-28    133<L>  |  102  |  21  ----------------------------<  124<H>  3.9   |  24  |  0.78    Ca    7.7<L>      28 Aug 2021 08:21    TPro  5.9<L>  /  Alb  2.4<L>  /  TBili  0.8  /  DBili  x   /  AST  91<H>  /  ALT  139<H>  /  AlkPhos  167<H>  08-28    LIVER FUNCTIONS - ( 28 Aug 2021 08:21 )  Alb: 2.4 g/dL / Pro: 5.9 gm/dL / ALK PHOS: 167 U/L / ALT: 139 U/L / AST: 91 U/L / GGT: x           PT/INR - ( 28 Aug 2021 08:21 )   PT: 14.1 sec;   INR: 1.22 ratio         PTT - ( 28 Aug 2021 08:21 )  PTT:25.1 sec      RADIOLOGY & ADDITIONAL STUDIES:   *images personally reviewed.   CT Chest 8/25/21 -   IMPRESSION:  No pulmonary embolism.  Severe patchy and partially confluent groundglass opacity in both lungs with patchy areas of more dense airspace disease is compatible with with COVID-19 pneumonia.  Small pleural effusions bilaterally.  A 2.5 x 2 cm right lower lobe nodular opacity appears more nodular and solid. Numerous mediastinal and hilar lymph nodes measure up to 1.5 cm short axis in subcarina region and 1.4 cm short axis in the left hilum. There is confluent soft tissue in the right hilum, surrounding the right upper lobe bronchus without associated airway narrowing. Underlying malignancy/lung cancer is not excluded. Repeat chest CT scan is recommended in approximately one month after therapy and resolution of COVID-19 pneumonia two reassess the lung findings.

## 2021-08-28 NOTE — PROVIDER CONTACT NOTE (OTHER) - ASSESSMENT
no acute distress  appears comfortable despite SaO2 >88%  patient was instructed how to use incentive spirometer earlier

## 2021-08-28 NOTE — PROGRESS NOTE ADULT - ASSESSMENT
71 year-old man, active smoker, presents with fever, cough, shortness of breath, found to have hypoxemia and infiltrates on imaging consistent with COVID pneumonia.   --CT chest has dense ground glass opacities diffusely, likely related to COVID. Noted by radiology is nodular opacity in RLL and mediastinal adenopathy - this may represent malignancy but given acute infiltrates I am not able to see what is acute from chronic.   --He has worsening oxygenation today, now on NRB with saturation around 90%.  Recommendations:  --change to high flow nasal cannula   --c/w treatment for COVID with decadron, remdesivir, and supportive care.  --follow up sputum culture if possible  --He will need repeat CT Chest approx 4-6 weeks after this CT chest to follow up these abnormalities once the acute infection has improved.     Will follow.  71 year-old man, active smoker, presents with fever, cough, shortness of breath, found to have hypoxemia and infiltrates on imaging consistent with COVID pneumonia.   --CT chest has dense ground glass opacities diffusely, likely related to COVID. Noted by radiology is nodular opacity in RLL and mediastinal adenopathy - this may represent malignancy but given acute infiltrates I am not able to see what is acute from chronic.   --He has worsening oxygenation today, now on NRB with saturation around 90%.  Recommendations:  --change to high flow nasal cannula   --Prone positioning as tolerated.   --c/w treatment for COVID with decadron, remdesivir, and supportive care.  --follow up sputum culture if possible  --He will need repeat CT Chest approx 4-6 weeks after this CT chest to follow up these abnormalities once the acute infection has improved.     Will follow.

## 2021-08-28 NOTE — PROGRESS NOTE ADULT - SUBJECTIVE AND OBJECTIVE BOX
CC: COVID (26 Aug 2021 13:16)    HPI:  Pt is a 72 yo male with a pmh/o HTN, HLD, skin CA who is a smoker and who quit smoking two weeks ago due to malaise, who presents to ED today due to worsening shortness of breath, cough, body aches and pains, fever, chills. Pt states symptoms have gotten significantly worse over past two days. Pt is not COVID vaccinated. Arrived with oxygen saturation at 86% on RA. Improved to mid 90's on 4L NC. Denies cp, palpitations, n/v/d, abd pain, constipation, rash, leg swelling, calf pain, dysuria, hematuria, sputum production, hemoptysis, HA.      INTERVAL HPI/ OVERNIGHT EVENTS: Pt was seen and examined,  Started  on HFNC states that feels less SOB since, + cough with brownish phlegm. No abd pain, had BM. Ok Oral intake        Vital Signs Last 24 Hrs  T(C): 36.8 (28 Aug 2021 16:32), Max: 36.8 (28 Aug 2021 16:32)  T(F): 98.2 (28 Aug 2021 16:32), Max: 98.2 (28 Aug 2021 16:32)  HR: 76 (28 Aug 2021 19:30) (74 - 84)  BP: 127/75 (28 Aug 2021 16:32) (127/75 - 146/72)  BP(mean): 94 (28 Aug 2021 08:40) (94 - 94)  RR: 18 (28 Aug 2021 14:15) (17 - 18)  SpO2: 93% (28 Aug 2021 19:57) (88% - 95%)    REVIEW OF SYSTEMS:  All other review of systems is negative unless indicated above.      PHYSICAL EXAM:  General: Well developed: looks acutely ill,  on HFNC  Eyes: EOMI; conjunctiva and sclera clear  Head: Normocephalic; atraumatic  ENMT: No nasal discharge; airway clear  Neck: Supple; non tender; no masses  Respiratory: Diminished BS,  No wheezes, rales or rhonchi  Cardiovascular: Regular rate and rhythm. S1 and S2 Normal;   Gastrointestinal: Soft , mildly distended, mildly tender at L lower abd, + bowel sounds  Genitourinary: No  suprapubic  tenderness  Extremities: No LE  edema  Vascular: Peripheral pulses palpable 2+ bilaterally  Neurological: Alert and oriented x3, non focal   Musculoskeletal: Normal muscle tone and strength   Psychiatric: Cooperative        LABS:                         13.1   17.99 )-----------( 225      ( 28 Aug 2021 08:21 )             36.9     08-28    133<L>  |  102  |  21  ----------------------------<  124<H>  3.9   |  24  |  0.78    Ca    7.7<L>      28 Aug 2021 08:21    TPro  5.9<L>  /  Alb  2.4<L>  /  TBili  0.8  /  DBili  x   /  AST  91<H>  /  ALT  139<H>  /  AlkPhos  167<H>  08-2  LIVER FUNCTIONS - ( 28 Aug 2021 08:21 )  Alb: 2.4 g/dL / Pro: 5.9 gm/dL / ALK PHOS: 167 U/L / ALT: 139 U/L / AST: 91 U/L / GGT: x           PT/INR - ( 28 Aug 2021 08:21 )   PT: 14.1 sec;   INR: 1.22 ratio      PTT - ( 28 Aug 2021 08:21 )  PTT:25.1 sec      CARDIAC MARKERS ( 25 Aug 2021 20:49 )  <0.015 ng/mL / x     / 78 U/L / x     / x                                12.6   8.64  )-----------( 230      ( 26 Aug 2021 08:43 )             36.2     26 Aug 2021 08:43    137    |  108    |  19     ----------------------------<  269    3.6     |  22     |  0.89     Ca    8.3        26 Aug 2021 08:43    TPro  6.1    /  Alb  2.1    /  TBili  0.8    /  DBili  x      /  AST  57     /  ALT  78     /  AlkPhos  133    26 Aug 2021 08:43    PT/INR - ( 25 Aug 2021 20:49 )   PT: 14.0 sec;   INR: 1.22 ratio    PTT - ( 25 Aug 2021 20:49 )  PTT:25.0 sec      LIVER FUNCTIONS - ( 26 Aug 2021 08:43 )  Alb: 2.1 g/dL / Pro: 6.1 gm/dL / ALK PHOS: 133 U/L / ALT: 78 U/L / AST: 57 U/L / GGT: x               MEDICATIONS  (STANDING):  amLODIPine   Tablet 5 milliGRAM(s) Oral daily  dexAMETHasone  Injectable 6 milliGRAM(s) IV Push daily  dextrose 40% Gel 15 Gram(s) Oral once  dextrose 5%. 1000 milliLiter(s) (50 mL/Hr) IV Continuous <Continuous>  dextrose 5%. 1000 milliLiter(s) (100 mL/Hr) IV Continuous <Continuous>  dextrose 50% Injectable 25 Gram(s) IV Push once  dextrose 50% Injectable 12.5 Gram(s) IV Push once  dextrose 50% Injectable 25 Gram(s) IV Push once  DULoxetine 60 milliGRAM(s) Oral daily  enoxaparin Injectable 40 milliGRAM(s) SubCutaneous daily  glucagon  Injectable 1 milliGRAM(s) IntraMuscular once  insulin lispro (ADMELOG) corrective regimen sliding scale   SubCutaneous three times a day before meals  insulin lispro (ADMELOG) corrective regimen sliding scale   SubCutaneous at bedtime  remdesivir  IVPB 100 milliGRAM(s) IV Intermittent every 24 hours  remdesivir  IVPB   IV Intermittent     MEDICATIONS  (PRN):  acetaminophen   Tablet .. 650 milliGRAM(s) Oral every 4 hours PRN Temp greater or equal to 38C (100.4F), Mild Pain (1 - 3)  ALBUTerol    90 MICROgram(s) HFA Inhaler 2 Puff(s) Inhalation every 4 hours PRN Shortness of Breath and/or Wheezing  benzonatate 100 milliGRAM(s) Oral three times a day PRN Cough  guaifenesin/dextromethorphan Oral Liquid 10 milliLiter(s) Oral every 4 hours PRN Cough  ondansetron Injectable 4 milliGRAM(s) IV Push every 8 hours PRN Nausea and/or Vomiting  simethicone 80 milliGRAM(s) Chew four times a day PRN Gas  zolpidem 5 milliGRAM(s) Oral at bedtime PRN Insomnia  zolpidem 5 milliGRAM(s) Oral at bedtime PRN Insomnia      RADIOLOGY & ADDITIONAL TESTS:  < from: CT Angio Chest PE Protocol w/ IV Cont (08.25.21 @ 22:53) >    EXAM:  CT ANGIO CHEST PULM ART [facility]                            PROCEDURE DATE:  08/25/2021          INTERPRETATION:  CLINICAL INFORMATION: COVID positive.  Positive d-dimer.    COMPARISON: 11/16/2012    CONTRAST/COMPLICATIONS:  IV Contrast: Omnipaque 350  90 cc administered   10 cc discarded  Oral Contrast: NONE  Complications: None reported at time of study completion    PROCEDURE:  CT Angiography of the Chest.  Sagittal and coronal reformats were performed as well as 3D (MIP) reconstructions.    FINDINGS:    LUNGS AND AIRWAYS: Patent central airways.  There is severe patchy and partially confluent groundglass opacity in both lungs with patchy areas of more dense airspace disease.  A 2.5 x 2 cm right lower lobe nodular opacity appears more nodular and solid  PLEURA: Small pleural effusions bilaterally.  MEDIASTINUM AND DONAVON: There are numerous mediastinal and hilar lymph nodes measuring up to approximately 1.5 cm short access in the subcarinal region (2:58) and 1.4 cm short access in the left hilum (2:51).  There is confluent soft tissue in the right hilum, surrounding the right upper lobe bronchus (2:45; there is no narrowing of the airway.  VESSELS: No pulmonary embolism.  Mild atherosclerosis of the visualized aorta and branch vessels.  HEART: The heart appears mildly enlarged.  Mild to moderate atherosclerotic calcification of the coronary arteries. No pericardial effusion.  CHEST WALL AND LOWER NECK: Within normal limits.  VISUALIZED UPPER ABDOMEN: Small hiatal hernia.  Enlarged spleen measures up to 15.3 cm in the axial plane (2:114.  Multiple left upper pole renal cysts are noted.  BONES: Degenerative changes in the spine.    IMPRESSION:  No pulmonary embolism.    Severe patchy and partially confluent groundglass opacity in both lungs with patchy areas of more dense airspace disease is compatible with with COVID-19 pneumonia.    Small pleural effusions bilaterally.    A 2.5 x 2 cm right lower lobe nodular opacity appears more nodular and solid.  Numerous mediastinal and hilar lymph nodes measure up to 1.5 cm short axis in subcarina region and 1.4 cm short axis in the left hilum.  There is confluent soft tissue in the right hilum, surrounding the right upper lobe bronchus without associated airway narrowing.Underlying malignancy/lung cancer is not excluded.  Repeat chest CT scan is recommended in approximately one month after therapy and resolution of COVID-19 pneumonia two reassess the lung findings.

## 2021-08-28 NOTE — PROGRESS NOTE ADULT - ASSESSMENT
72 y/o male with h/o HTN, HLD, skin CA was admitted on 8/25 for worsening shortness of breath, cough, body aches and pains, fever, chills. Pt states symptoms have gotten significantly worse over past two days PTA. In ER, he arrived with oxygen saturation at 86% on RA. Improved to mid 90's on 4L NC. Denies fever or chills at home.     1. COVID-19 viral syndrome. Multifocal pneumonia. Acute respiratory failure. Allergy to PCN.   -febrile syndrome  -respiratory frail  -on remdesivir protocol # 3  -tolerating abx well so far; no side effects noted  -O2 therapy  -steroids  -AC  -droplet isolation  -respiratory care  -concern for pulmonary malignancy reported by radiologist; will need f/u as outpatient  -continue antiviral therapy  -monitor temps  -f/u CBC  -supportive care  2. Other issues:   -care per medicine

## 2021-08-28 NOTE — PROGRESS NOTE ADULT - SUBJECTIVE AND OBJECTIVE BOX
Date of service: 08-28-21 @ 08:15    Sitting in bed in NAD  Has SOB with right exercise  Has dry cough    ROS: no fever or chills; denies dizziness, no HA, no abdominal pain, no diarrhea or constipation; no dysuria, no legs pain, no rashes    MEDICATIONS  (STANDING):  amLODIPine   Tablet 5 milliGRAM(s) Oral daily  dexAMETHasone  Injectable 6 milliGRAM(s) IV Push daily  dextrose 40% Gel 15 Gram(s) Oral once  dextrose 5%. 1000 milliLiter(s) (50 mL/Hr) IV Continuous <Continuous>  dextrose 5%. 1000 milliLiter(s) (100 mL/Hr) IV Continuous <Continuous>  dextrose 50% Injectable 25 Gram(s) IV Push once  dextrose 50% Injectable 12.5 Gram(s) IV Push once  dextrose 50% Injectable 25 Gram(s) IV Push once  DULoxetine 60 milliGRAM(s) Oral daily  enoxaparin Injectable 40 milliGRAM(s) SubCutaneous daily  glucagon  Injectable 1 milliGRAM(s) IntraMuscular once  insulin lispro (ADMELOG) corrective regimen sliding scale   SubCutaneous three times a day before meals  insulin lispro (ADMELOG) corrective regimen sliding scale   SubCutaneous at bedtime  remdesivir  IVPB 100 milliGRAM(s) IV Intermittent every 24 hours  remdesivir  IVPB   IV Intermittent     Vital Signs Last 24 Hrs  T(C): 36.6 (27 Aug 2021 23:46), Max: 36.6 (27 Aug 2021 23:46)  T(F): 97.9 (27 Aug 2021 23:46), Max: 97.9 (27 Aug 2021 23:46)  HR: 83 (27 Aug 2021 23:46) (67 - 83)  BP: 136/69 (27 Aug 2021 23:46) (126/67 - 136/69)  BP(mean): 85 (27 Aug 2021 09:21) (85 - 85)  RR: 18 (28 Aug 2021 01:53) (17 - 18)  SpO2: 91% (28 Aug 2021 01:53) (78% - 92%)     Physical exam:    Constitutional:  No acute distress  HEENT: NC/AT, EOMI, PERRLA, conjunctivae clear  Neck: supple; thyroid not palpable  Back: no tenderness  Respiratory: respiratory effort normal; crackles at bases  Cardiovascular: S1S2 regular, no murmurs  Abdomen: soft, not tender, not distended, positive BS  Genitourinary: no suprapubic tenderness  Lymphatic: no LN palpable  Musculoskeletal: no muscle tenderness, no joint swelling or tenderness  Extremities: no pedal edema  Neurological/ Psychiatric: AxOx3, moving all extremities  Skin: no rashes; no palpable lesions    Labs: reviewed                        12.5   22.44 )-----------( 298      ( 27 Aug 2021 08:53 )             35.3     08-27    138  |  106  |  19  ----------------------------<  143<H>  3.9   |  24  |  0.72    Ca    8.4<L>      27 Aug 2021 08:53    TPro  6.1  /  Alb  2.1<L>  /  TBili  0.8  /  DBili  x   /  AST  57<H>  /  ALT  78  /  AlkPhos  133<H>  08-26        C-Reactive Protein, Serum: 224 mg/L (08-25-21 @ 20:49)  D-Dimer Assay, Quantitative: 1819 ng/mL DDU (08-25-21 @ 20:49)  Ferritin, Serum: 1675 ng/mL (08-25-21 @ 20:49)                        12.5   22.44 )-----------( 298      ( 27 Aug 2021 08:53 )             35.3     08-27    138  |  106  |  19  ----------------------------<  143<H>  3.9   |  24  |  0.72    Ca    8.4<L>      27 Aug 2021 08:53    TPro  6.1  /  Alb  2.1<L>  /  TBili  0.8  /  DBili  x   /  AST  57<H>  /  ALT  78  /  AlkPhos  133<H>  08-26    C-Reactive Protein, Serum: 224 mg/L (08-25-21 @ 20:49)  D-Dimer Assay, Quantitative: 1819 ng/mL DDU (08-25-21 @ 20:49)  Ferritin, Serum: 1675 ng/mL (08-25-21 @ 20:49)                        12.6   8.64  )-----------( 230      ( 26 Aug 2021 08:43 )             36.2     08-26    137  |  108  |  19  ----------------------------<  269<H>  3.6   |  22  |  0.89    Ca    8.3<L>      26 Aug 2021 08:43    TPro  6.1  /  Alb  2.1<L>  /  TBili  0.8  /  DBili  x   /  AST  57<H>  /  ALT  78  /  AlkPhos  133<H>  08-26     LIVER FUNCTIONS - ( 26 Aug 2021 08:43 )  Alb: 2.1 g/dL / Pro: 6.1 gm/dL / ALK PHOS: 133 U/L / ALT: 78 U/L / AST: 57 U/L / GGT: x           COVID (08-25 @ 20:49)  Detected      Radiology: all available radiological tests reviewed    < from: CT Angio Chest PE Protocol w/ IV Cont (08.25.21 @ 22:53) >  No pulmonary embolism.  Severe patchy and partially confluent groundglass opacity in both lungs with patchy areas of more dense airspace disease is compatible with with COVID-19 pneumonia.  Small pleural effusions bilaterally.  A 2.5 x 2 cm right lower lobe nodular opacity appears more nodular and solid.  Numerous mediastinal and hilar lymph nodes measure up to 1.5 cm short axis in subcarina region and 1.4 cm short axis in the left hilum.  There is confluent soft tissue in the right hilum, surrounding the right upper lobe bronchus without associated airway narrowing.Underlying malignancy/lung cancer is not excluded.  Repeat chest CT scan is recommended in approximately one month after therapy and resolution of COVID-19 pneumonia two reassess the lung findings.    < end of copied text >      Advanced directives addressed: full resuscitation

## 2021-08-28 NOTE — PROGRESS NOTE ADULT - ASSESSMENT
Pt is a 72 yo male with a pmh/o HTN, HLD, skin CA admitted for:         #Acute hypoxic respiratory Failure  secondary to COVID 19 PNA  - CTA chest: neg for PE, extensive pulm infiltrates, with RLL nodularity and mediastinal mass, possible Lung  malignancy   - Maintain on airborne isolation.  - Continue with O2 via HF nasal cannula, titrate PRN to keep P2 sats >92%  - continuous pulse ox ,  sats low 90s   - Acetaminophen 650 mg PO q4h PRN fever  - HFA albuterol Q6 hour PRN   - C/w  Dexamethasone & Remdesivir Day 4  -Mucinex  - F/u sputum Cx  -  trend proinflammatory markers   - D/w DR Oshea and Dr Delvalle, will need f/u CT to follow up on infiltrates and possible mass     # Hyperglycemia  - HB A1c 6.3, pre diabetes   - BS elevated 2/2 dexamethasone  - Monitor BS, cover with ISS       #Hypokalemia  -repleted   - monitor     #HTN  - monitor BP, stable  - resume on amlodipine     # Constipation  CT abd neg for obstruction  bowel regiment  check UA        # Bacteremia   BCX: GR + Coagulase neg staph  d/w Dr Oshea, likely contaminant     #Skin CA  Pt may use own 5FU cream topically as directed    # DVT PPX: Lovenox SQ    Dispo: monitor closely,  respiratory status frail, high risk of intubation

## 2021-08-29 LAB
ANION GAP SERPL CALC-SCNC: 6 MMOL/L — SIGNIFICANT CHANGE UP (ref 5–17)
BUN SERPL-MCNC: 24 MG/DL — HIGH (ref 7–23)
CALCIUM SERPL-MCNC: 8.2 MG/DL — LOW (ref 8.5–10.1)
CHLORIDE SERPL-SCNC: 103 MMOL/L — SIGNIFICANT CHANGE UP (ref 96–108)
CO2 SERPL-SCNC: 26 MMOL/L — SIGNIFICANT CHANGE UP (ref 22–31)
CREAT SERPL-MCNC: 0.75 MG/DL — SIGNIFICANT CHANGE UP (ref 0.5–1.3)
D DIMER BLD IA.RAPID-MCNC: 9326 NG/ML DDU — HIGH
GLUCOSE SERPL-MCNC: 127 MG/DL — HIGH (ref 70–99)
HCT VFR BLD CALC: 39.2 % — SIGNIFICANT CHANGE UP (ref 39–50)
HGB BLD-MCNC: 13.9 G/DL — SIGNIFICANT CHANGE UP (ref 13–17)
MCHC RBC-ENTMCNC: 32.8 PG — SIGNIFICANT CHANGE UP (ref 27–34)
MCHC RBC-ENTMCNC: 35.5 GM/DL — SIGNIFICANT CHANGE UP (ref 32–36)
MCV RBC AUTO: 92.5 FL — SIGNIFICANT CHANGE UP (ref 80–100)
PLATELET # BLD AUTO: 210 K/UL — SIGNIFICANT CHANGE UP (ref 150–400)
POTASSIUM SERPL-MCNC: 4.1 MMOL/L — SIGNIFICANT CHANGE UP (ref 3.5–5.3)
POTASSIUM SERPL-SCNC: 4.1 MMOL/L — SIGNIFICANT CHANGE UP (ref 3.5–5.3)
RBC # BLD: 4.24 M/UL — SIGNIFICANT CHANGE UP (ref 4.2–5.8)
RBC # FLD: 12.8 % — SIGNIFICANT CHANGE UP (ref 10.3–14.5)
SODIUM SERPL-SCNC: 135 MMOL/L — SIGNIFICANT CHANGE UP (ref 135–145)
WBC # BLD: 18.34 K/UL — HIGH (ref 3.8–10.5)
WBC # FLD AUTO: 18.34 K/UL — HIGH (ref 3.8–10.5)

## 2021-08-29 PROCEDURE — 99291 CRITICAL CARE FIRST HOUR: CPT

## 2021-08-29 PROCEDURE — 93970 EXTREMITY STUDY: CPT | Mod: 26

## 2021-08-29 PROCEDURE — 99233 SBSQ HOSP IP/OBS HIGH 50: CPT

## 2021-08-29 RX ORDER — CEFTRIAXONE 500 MG/1
2000 INJECTION, POWDER, FOR SOLUTION INTRAMUSCULAR; INTRAVENOUS EVERY 24 HOURS
Refills: 0 | Status: DISCONTINUED | OUTPATIENT
Start: 2021-08-29 | End: 2021-09-05

## 2021-08-29 RX ORDER — ENOXAPARIN SODIUM 100 MG/ML
50 INJECTION SUBCUTANEOUS ONCE
Refills: 0 | Status: COMPLETED | OUTPATIENT
Start: 2021-08-29 | End: 2021-08-29

## 2021-08-29 RX ORDER — ENOXAPARIN SODIUM 100 MG/ML
90 INJECTION SUBCUTANEOUS
Refills: 0 | Status: DISCONTINUED | OUTPATIENT
Start: 2021-08-29 | End: 2021-09-07

## 2021-08-29 RX ADMIN — AMLODIPINE BESYLATE 5 MILLIGRAM(S): 2.5 TABLET ORAL at 09:39

## 2021-08-29 RX ADMIN — ENOXAPARIN SODIUM 50 MILLIGRAM(S): 100 INJECTION SUBCUTANEOUS at 11:44

## 2021-08-29 RX ADMIN — Medication 6 MILLIGRAM(S): at 09:39

## 2021-08-29 RX ADMIN — DULOXETINE HYDROCHLORIDE 60 MILLIGRAM(S): 30 CAPSULE, DELAYED RELEASE ORAL at 09:39

## 2021-08-29 RX ADMIN — ENOXAPARIN SODIUM 40 MILLIGRAM(S): 100 INJECTION SUBCUTANEOUS at 09:39

## 2021-08-29 RX ADMIN — Medication 2: at 18:03

## 2021-08-29 RX ADMIN — ZOLPIDEM TARTRATE 5 MILLIGRAM(S): 10 TABLET ORAL at 22:44

## 2021-08-29 RX ADMIN — ALBUTEROL 2 PUFF(S): 90 AEROSOL, METERED ORAL at 08:48

## 2021-08-29 RX ADMIN — ENOXAPARIN SODIUM 90 MILLIGRAM(S): 100 INJECTION SUBCUTANEOUS at 23:17

## 2021-08-29 RX ADMIN — CEFTRIAXONE 2000 MILLIGRAM(S): 500 INJECTION, POWDER, FOR SOLUTION INTRAMUSCULAR; INTRAVENOUS at 11:45

## 2021-08-29 RX ADMIN — REMDESIVIR 540 MILLIGRAM(S): 5 INJECTION INTRAVENOUS at 22:44

## 2021-08-29 NOTE — PROGRESS NOTE ADULT - SUBJECTIVE AND OBJECTIVE BOX
Date of service: 08-29-21 @ 10:58    Sitting in bed in NAD  Has SOB at rest  Has cough  Noted with leg DVT    ROS: no fever or chills; denies dizziness, no HA, no abdominal pain, no diarrhea or constipation; no dysuria, no rashes    MEDICATIONS  (STANDING):  amLODIPine   Tablet 5 milliGRAM(s) Oral daily  cefTRIAXone Injectable. 2000 milliGRAM(s) IV Push every 24 hours  dexAMETHasone  Injectable 6 milliGRAM(s) IV Push daily  dextrose 40% Gel 15 Gram(s) Oral once  dextrose 5%. 1000 milliLiter(s) (50 mL/Hr) IV Continuous <Continuous>  dextrose 5%. 1000 milliLiter(s) (100 mL/Hr) IV Continuous <Continuous>  dextrose 50% Injectable 25 Gram(s) IV Push once  dextrose 50% Injectable 12.5 Gram(s) IV Push once  dextrose 50% Injectable 25 Gram(s) IV Push once  DULoxetine 60 milliGRAM(s) Oral daily  enoxaparin Injectable 90 milliGRAM(s) SubCutaneous two times a day  enoxaparin Injectable 50 milliGRAM(s) SubCutaneous once  glucagon  Injectable 1 milliGRAM(s) IntraMuscular once  insulin lispro (ADMELOG) corrective regimen sliding scale   SubCutaneous three times a day before meals  insulin lispro (ADMELOG) corrective regimen sliding scale   SubCutaneous at bedtime  remdesivir  IVPB 100 milliGRAM(s) IV Intermittent every 24 hours  remdesivir  IVPB   IV Intermittent     Vital Signs Last 24 Hrs  T(C): 36.8 (29 Aug 2021 08:46), Max: 36.8 (28 Aug 2021 16:32)  T(F): 98.3 (29 Aug 2021 08:46), Max: 98.3 (29 Aug 2021 08:46)  HR: 70 (29 Aug 2021 08:46) (69 - 84)  BP: 135/82 (29 Aug 2021 08:46) (127/75 - 135/82)  BP(mean): 98 (29 Aug 2021 08:46) (98 - 98)  RR: 20 (29 Aug 2021 08:46) (18 - 20)  SpO2: 89% (29 Aug 2021 08:46) (87% - 95%)     Physical exam:    Constitutional:  No acute distress  HEENT: NC/AT, EOMI, PERRLA, conjunctivae clear  Neck: supple; thyroid not palpable  Back: no tenderness  Respiratory: respiratory effort normal; crackles at bases  Cardiovascular: S1S2 regular, no murmurs  Abdomen: soft, not tender, not distended, positive BS  Genitourinary: no suprapubic tenderness  Lymphatic: no LN palpable  Musculoskeletal: no muscle tenderness, no joint swelling or tenderness  Extremities: no pedal edema  Neurological/ Psychiatric: AxOx3, moving all extremities  Skin: no rashes; no palpable lesions    Labs: reviewed                        12.5 22.44 )-----------( 298      ( 27 Aug 2021 08:53 )             35.3     08-27    138  |  106  |  19  ----------------------------<  143<H>  3.9   |  24  |  0.72    Ca    8.4<L>      27 Aug 2021 08:53    TPro  6.1  /  Alb  2.1<L>  /  TBili  0.8  /  DBili  x   /  AST  57<H>  /  ALT  78  /  AlkPhos  133<H>  08-26        C-Reactive Protein, Serum: 224 mg/L (08-25-21 @ 20:49)  D-Dimer Assay, Quantitative: 1819 ng/mL DDU (08-25-21 @ 20:49)  Ferritin, Serum: 1675 ng/mL (08-25-21 @ 20:49)                        12.5 22.44 )-----------( 298      ( 27 Aug 2021 08:53 )             35.3     08-27    138  |  106  |  19  ----------------------------<  143<H>  3.9   |  24  |  0.72    Ca    8.4<L>      27 Aug 2021 08:53    TPro  6.1  /  Alb  2.1<L>  /  TBili  0.8  /  DBili  x   /  AST  57<H>  /  ALT  78  /  AlkPhos  133<H>  08-26    C-Reactive Protein, Serum: 224 mg/L (08-25-21 @ 20:49)  D-Dimer Assay, Quantitative: 1819 ng/mL DDU (08-25-21 @ 20:49)  Ferritin, Serum: 1675 ng/mL (08-25-21 @ 20:49)                        12.6   8.64  )-----------( 230      ( 26 Aug 2021 08:43 )             36.2     08-26    137  |  108  |  19  ----------------------------<  269<H>  3.6   |  22  |  0.89    Ca    8.3<L>      26 Aug 2021 08:43    TPro  6.1  /  Alb  2.1<L>  /  TBili  0.8  /  DBili  x   /  AST  57<H>  /  ALT  78  /  AlkPhos  133<H>  08-26     LIVER FUNCTIONS - ( 26 Aug 2021 08:43 )  Alb: 2.1 g/dL / Pro: 6.1 gm/dL / ALK PHOS: 133 U/L / ALT: 78 U/L / AST: 57 U/L / GGT: x           COVID (08-25 @ 20:49)  Detected      Culture - Sputum (collected 27 Aug 2021 05:21)  Source: .Sputum Sputum  Gram Stain (28 Aug 2021 13:37):    Few polymorphonuclear leukocytes per low power field    Few Squamous epithelial cells per low power field    Moderate Gram Positive Cocci in Clusters per oil power field    Few Gram Negative Rods per oil power field    Few Gram Positive Rods per oil power field    Few Gram Negative Diplococci per oil power field  Preliminary Report (29 Aug 2021 10:26):    Moderate Escherichia coli Susceptibility to follow.    Normal Respiratory Celine present    Culture - Blood (collected 25 Aug 2021 20:49)  Source: .Blood None  Gram Stain (26 Aug 2021 19:21):    Growth in aerobic bottle: Gram Positive Cocci in Clusters    Growth in anaerobic bottle: Gram Positive Cocci in Clusters  Final Report (27 Aug 2021 17:40):    Growth in aerobic and anaerobic bottles: Staphylococcus hominis    Coag Negative Staphylococcus    Single set isolate, possible contaminant. Contact    Microbiology if susceptibility testing clinically    indicated.  Organism: Blood Culture PCR (27 Aug 2021 17:40)      -  Coagulase negative Staphylococcus: Detec      Method Type: PCR        Radiology: all available radiological tests reviewed    < from: CT Angio Chest PE Protocol w/ IV Cont (08.25.21 @ 22:53) >  No pulmonary embolism.  Severe patchy and partially confluent groundglass opacity in both lungs with patchy areas of more dense airspace disease is compatible with with COVID-19 pneumonia.  Small pleural effusions bilaterally.  A 2.5 x 2 cm right lower lobe nodular opacity appears more nodular and solid.  Numerous mediastinal and hilar lymph nodes measure up to 1.5 cm short axis in subcarina region and 1.4 cm short axis in the left hilum.  There is confluent soft tissue in the right hilum, surrounding the right upper lobe bronchus without associated airway narrowing.Underlying malignancy/lung cancer is not excluded.  Repeat chest CT scan is recommended in approximately one month after therapy and resolution of COVID-19 pneumonia two reassess the lung findings.  < end of copied text >    < from: US Duplex Venous Lower Ext Complete, Bilateral (08.29.21 @ 08:51) >  Below-the-knee left deep veinthrombosis involving the gastrocnemius and posterior tibial veins.  < end of copied text >      Advanced directives addressed: full resuscitation

## 2021-08-29 NOTE — PROGRESS NOTE ADULT - ASSESSMENT
Pt is a 72 yo male with a pmh/o HTN, HLD, skin CA admitted for:         #Acute hypoxic respiratory Failure  secondary to COVID 19 PNA  - CTA chest: neg for PE, extensive pulm infiltrates, with RLL nodularity and mediastinal mass, possible Lung  malignancy   - Maintain on airborne isolation.  - Continue with O2 via HF nasal cannula,  up titrated this am, Pulse ox improved,    keep O2 sats >92%  - continuous pulse o  - Acetaminophen 650 mg PO q4h PRN fever  - HFA albuterol Q6 hour PRN   - C/w  Dexamethasone & Remdesivir Day 5  -Mucinex  - F/u sputum Cx  -  trend proinflammatory markers   -will need f/u CT to follow up on infiltrates and possible mass  in 4-6 weeks       # Hyperglycemia  - HB A1c 6.3, pre diabetes   - BS elevated 2/2 dexamethasone  - Monitor BS, cover with ISS       #Hypokalemia  -repleted   - monitor     #HTN  - monitor BP, stable  - resume on amlodipine     # Constipation  CT abd neg for obstruction  bowel regiment  check UA        #  Coagulase negative Staph Bacteremia  BCX x 1 set:  + Coagulase neg staph, possible contamination   resend Cx  C/w IV  Rocephin   D/w DR Oshea    # + E.COLI in sputum, suspect superimposed Bacterial PNA   On IV Rocephin   F/u final Cx results   D/w Dr oshea      # Acute LLE below the knee DVT  - as pt is high risk and cant r/o PE ( Pt is on HFNC) will start on Full dose A/c  - Lovenox 90mg SQ BID      #Skin CA  Pt may use own 5FU cream topically as directed    # DVT PPX: Lovenox SQ    Dispo: monitor closely,  respiratory status frail, high risk of intubation

## 2021-08-29 NOTE — PROGRESS NOTE ADULT - SUBJECTIVE AND OBJECTIVE BOX
SUBJECTIVE:  Started on high flow yesterday.   Breathing improved today.   Today oxygen saturation is 96%      ROS otherwise negative.     PHYSICAL EXAMINATION:  GENERAL APPEARANCE:  No distress. No accessory muscle use.   HEAD: Normocephalic atraumatic   EYES: Pupils are normal. No icterus. Sclera white.   HEENT:  Mucus membranes moist. Oropharynx normal.   NECK:  Supple. No JVD.   HEART:  Normal S1 and S2. There are no murmurs appreciated   CHEST:  Clear to ausculation bilaterally. Normal chest excursion. No wheezing. No crackles. No rhonchi   ABDOMEN:  Soft and nontender. Nondistended.   EXTREMITIES:  There is no cyanosis, clubbing or edema.   SKIN:  No rash or significant lesions are noted. No jaundice.  PSYCH: Normal affect.  NEURO: Alert and oriented. Responds to question appropriate. No focal deficits appreciated.     Vital Signs Last 24 Hrs  T(C): 36.8 (29 Aug 2021 08:46), Max: 36.8 (28 Aug 2021 16:32)  T(F): 98.3 (29 Aug 2021 08:46), Max: 98.3 (29 Aug 2021 08:46)  HR: 70 (29 Aug 2021 08:46) (69 - 84)  BP: 135/82 (29 Aug 2021 08:46) (127/75 - 135/82)  BP(mean): 98 (29 Aug 2021 08:46) (98 - 98)  RR: 20 (29 Aug 2021 08:46) (18 - 20)  SpO2: 89% (29 Aug 2021 08:46) (87% - 95%)    MEDICATIONS  (STANDING):  amLODIPine   Tablet 5 milliGRAM(s) Oral daily  cefTRIAXone Injectable. 2000 milliGRAM(s) IV Push every 24 hours  dexAMETHasone  Injectable 6 milliGRAM(s) IV Push daily  dextrose 40% Gel 15 Gram(s) Oral once  dextrose 5%. 1000 milliLiter(s) (50 mL/Hr) IV Continuous <Continuous>  dextrose 5%. 1000 milliLiter(s) (100 mL/Hr) IV Continuous <Continuous>  dextrose 50% Injectable 25 Gram(s) IV Push once  dextrose 50% Injectable 12.5 Gram(s) IV Push once  dextrose 50% Injectable 25 Gram(s) IV Push once  DULoxetine 60 milliGRAM(s) Oral daily  enoxaparin Injectable 90 milliGRAM(s) SubCutaneous two times a day  glucagon  Injectable 1 milliGRAM(s) IntraMuscular once  insulin lispro (ADMELOG) corrective regimen sliding scale   SubCutaneous at bedtime  insulin lispro (ADMELOG) corrective regimen sliding scale   SubCutaneous three times a day before meals  remdesivir  IVPB 100 milliGRAM(s) IV Intermittent every 24 hours  remdesivir  IVPB   IV Intermittent     MEDICATIONS  (PRN):  acetaminophen   Tablet .. 650 milliGRAM(s) Oral every 4 hours PRN Temp greater or equal to 38C (100.4F), Mild Pain (1 - 3)  ALBUTerol    90 MICROgram(s) HFA Inhaler 2 Puff(s) Inhalation every 4 hours PRN Shortness of Breath and/or Wheezing  benzonatate 100 milliGRAM(s) Oral three times a day PRN Cough  guaifenesin/dextromethorphan Oral Liquid 10 milliLiter(s) Oral every 4 hours PRN Cough  ondansetron Injectable 4 milliGRAM(s) IV Push every 8 hours PRN Nausea and/or Vomiting  simethicone 80 milliGRAM(s) Chew four times a day PRN Gas  zolpidem 5 milliGRAM(s) Oral at bedtime PRN Insomnia  zolpidem 5 milliGRAM(s) Oral at bedtime PRN Insomnia      Allergies    penicillin (Unknown)    Intolerances          LABS:                        13.9   18.34 )-----------( 210      ( 29 Aug 2021 08:12 )             39.2     08-29    135  |  103  |  24<H>  ----------------------------<  127<H>  4.1   |  26  |  0.75    Ca    8.2<L>      29 Aug 2021 08:12    TPro  5.9<L>  /  Alb  2.4<L>  /  TBili  0.8  /  DBili  x   /  AST  91<H>  /  ALT  139<H>  /  AlkPhos  167<H>  08-28    LIVER FUNCTIONS - ( 28 Aug 2021 08:21 )  Alb: 2.4 g/dL / Pro: 5.9 gm/dL / ALK PHOS: 167 U/L / ALT: 139 U/L / AST: 91 U/L / GGT: x           PT/INR - ( 28 Aug 2021 08:21 )   PT: 14.1 sec;   INR: 1.22 ratio         PTT - ( 28 Aug 2021 08:21 )  PTT:25.1 sec          RADIOLOGY & ADDITIONAL STUDIES:   *Imaging personally reviewed.       RADIOLOGY & ADDITIONAL STUDIES:   *images personally reviewed.   CT Chest 8/25/21 -   IMPRESSION:  No pulmonary embolism.  Severe patchy and partially confluent groundglass opacity in both lungs with patchy areas of more dense airspace disease is compatible with with COVID-19 pneumonia.  Small pleural effusions bilaterally.  A 2.5 x 2 cm right lower lobe nodular opacity appears more nodular and solid. Numerous mediastinal and hilar lymph nodes measure up to 1.5 cm short axis in subcarina region and 1.4 cm short axis in the left hilum. There is confluent soft tissue in the right hilum, surrounding the right upper lobe bronchus without associated airway narrowing. Underlying malignancy/lung cancer is not excluded. Repeat chest CT scan is recommended in approximately one month after therapy and resolution of COVID-19 pneumonia two reassess the lung findings.

## 2021-08-29 NOTE — PROGRESS NOTE ADULT - ASSESSMENT
71 year-old man, active smoker, presents with fever, cough, shortness of breath, found to have hypoxemia and infiltrates on imaging consistent with COVID pneumonia.   --CT chest has dense ground glass opacities diffusely, likely related to COVID. Noted by radiology is nodular opacity in RLL and mediastinal adenopathy - this may represent malignancy but given acute infiltrates I am not able to see what is acute from chronic.   --He appears slightly improved since yesterday - but clinical status still tenuous     Recommendations:  --c/w high flow nasal cannula   --Prone positioning as tolerated.   --c/w treatment for COVID with decadron, remdesivir, and supportive care.  --He will need repeat CT Chest approx 4-6 weeks after this CT chest to follow up these abnormalities once the acute infection has improved.     Will follow.

## 2021-08-29 NOTE — PROGRESS NOTE ADULT - ASSESSMENT
70 y/o male with h/o HTN, HLD, skin CA was admitted on 8/25 for worsening shortness of breath, cough, body aches and pains, fever, chills. Pt states symptoms have gotten significantly worse over past two days PTA. In ER, he arrived with oxygen saturation at 86% on RA. Improved to mid 90's on 4L NC. Denies fever or chills at home.     1. COVID-19 viral syndrome. Multifocal pneumonia. Possible superimposed bacterial pneumonia with E.coli. Bacteremia with CNST ?contaminant. Acute respiratory failure. Left leg DVT. Allergy to PCN.   -febrile syndrome  -respiratory frail  -on remdesivir protocol # 4  -tolerating abx well so far; no side effects noted  -repeat BC x 2  -add ceftriaxone 2 gm IV qd  -reason for abx use and side effects reviewed with patient; monitor BMP   -monitor closely in kelly of PCN allergy history  -O2 therapy  -steroids  -on full AC  -droplet isolation  -respiratory care  -concern for pulmonary malignancy reported by radiologist; will need f/u as outpatient  -continue antiviral therapy  -monitor temps  -f/u CBC  -supportive care  2. Other issues:   -care per medicine

## 2021-08-29 NOTE — PROGRESS NOTE ADULT - SUBJECTIVE AND OBJECTIVE BOX
CC: COVID     HPI:  Pt is a 72 yo male with a pmh/o HTN, HLD, skin CA who is a smoker and who quit smoking two weeks ago due to malaise, who presents to ED today due to worsening shortness of breath, cough, body aches and pains, fever, chills. Pt states symptoms have gotten significantly worse over past two days. Pt is not COVID vaccinated. Arrived with oxygen saturation at 86% on RA. Improved to mid 90's on 4L NC. Denies cp, palpitations, n/v/d, abd pain, constipation, rash, leg swelling, calf pain, dysuria, hematuria, sputum production, hemoptysis, HA.      INTERVAL HPI/ OVERNIGHT EVENTS: Pt was seen and examined,   on NC, settings adjusted this am, Pt reports that feels somewhat better now, has good appetite, still cough. Results of  Dopplers and A/c discussed      Vital Signs Last 24 Hrs  T(C): 37.1 (29 Aug 2021 15:08), Max: 37.1 (29 Aug 2021 15:08)  T(F): 98.8 (29 Aug 2021 15:08), Max: 98.8 (29 Aug 2021 15:08)  HR: 81 (29 Aug 2021 15:08) (69 - 81)  BP: 127/72 (29 Aug 2021 15:08) (127/72 - 135/82)  BP(mean): 98 (29 Aug 2021 08:46) (98 - 98)  RR: 20 (29 Aug 2021 08:46) (20 - 20)  SpO2: 98% (29 Aug 2021 16:15) (87% - 98%)      REVIEW OF SYSTEMS:  All other review of systems is negative unless indicated above.      PHYSICAL EXAM:  General: Well developed: looks acutely ill,  NAD on HFNC  Eyes: EOMI; conjunctiva and sclera clear  Head: Normocephalic; atraumatic  ENMT: No nasal discharge; airway clear  Neck: Supple; non tender; no masses  Respiratory: Better air entry b.l, but still BS decreased,  No wheezes, rales or rhonchi  Cardiovascular: Regular rate and rhythm. S1 and S2 Normal;   Gastrointestinal: Soft , mildly distended, mildly tender at L lower abd, + bowel sounds  Genitourinary: No  suprapubic  tenderness  Extremities: No LE  edema  Vascular: Peripheral pulses palpable 2+ bilaterally  Neurological: Alert and oriented x3, non focal   Musculoskeletal: Normal muscle tone and strength   Psychiatric: Cooperative        LABS:                         13.9   18.34 )-----------( 210      ( 29 Aug 2021 08:12 )             39.2     08-29    135  |  103  |  24<H>  ----------------------------<  127<H>  4.1   |  26  |  0.75    Ca    8.2<L>      29 Aug 2021 08:12                          13.1   17.99 )-----------( 225      ( 28 Aug 2021 08:21 )             36.9     08-28    133<L>  |  102  |  21  ----------------------------<  124<H>  3.9   |  24  |  0.78    Ca    7.7<L>      28 Aug 2021 08:21    TPro  5.9<L>  /  Alb  2.4<L>  /  TBili  0.8  /  DBili  x   /  AST  91<H>  /  ALT  139<H>  /  AlkPhos  167<H>  08-2  LIVER FUNCTIONS - ( 28 Aug 2021 08:21 )  Alb: 2.4 g/dL / Pro: 5.9 gm/dL / ALK PHOS: 167 U/L / ALT: 139 U/L / AST: 91 U/L / GGT: x           PT/INR - ( 28 Aug 2021 08:21 )   PT: 14.1 sec;   INR: 1.22 ratio      PTT - ( 28 Aug 2021 08:21 )  PTT:25.1 sec      CARDIAC MARKERS ( 25 Aug 2021 20:49 )  <0.015 ng/mL / x     / 78 U/L / x     / x                                12.6   8.64  )-----------( 230      ( 26 Aug 2021 08:43 )             36.2     26 Aug 2021 08:43    137    |  108    |  19     ----------------------------<  269    3.6     |  22     |  0.89     Ca    8.3        26 Aug 2021 08:43    TPro  6.1    /  Alb  2.1    /  TBili  0.8    /  DBili  x      /  AST  57     /  ALT  78     /  AlkPhos  133    26 Aug 2021 08:43    PT/INR - ( 25 Aug 2021 20:49 )   PT: 14.0 sec;   INR: 1.22 ratio    PTT - ( 25 Aug 2021 20:49 )  PTT:25.0 sec      LIVER FUNCTIONS - ( 26 Aug 2021 08:43 )  Alb: 2.1 g/dL / Pro: 6.1 gm/dL / ALK PHOS: 133 U/L / ALT: 78 U/L / AST: 57 U/L / GGT: x           Culture - Sputum . (08.27.21 @ 05:21)   Gram Stain:   Few polymorphonuclear leukocytes per low power field   Few Squamous epithelial cells per low power field   Moderate Gram Positive Cocci in Clusters per oil power field   Few Gram Negative Rods per oil power field   Few Gram Positive Rods per oil power field   Few Gram Negative Diplococci per oil power field   Specimen Source: .Sputum Sputum   Culture Results:   Moderate Escherichia coli Susceptibility to follow.   Normal Respiratory Celine present       Culture - Blood (08.25.21 @ 20:49)   - Coagulase negative Staphylococcus: Detec   Gram Stain:   Growth in aerobic bottle: Gram Positive Cocci in Clusters   Growth in anaerobic bottle: Gram Positive Cocci in Clusters   Specimen Source: .Blood None   Organism: Blood Culture PCR   Culture Results:   Growth in aerobic and anaerobic bottles: Staphylococcus hominis   Coag Negative Staphylococcus   Single set isolate, possible contaminant.         MEDICATIONS  (STANDING):  amLODIPine   Tablet 5 milliGRAM(s) Oral daily  cefTRIAXone Injectable. 2000 milliGRAM(s) IV Push every 24 hours  dexAMETHasone  Injectable 6 milliGRAM(s) IV Push daily  dextrose 40% Gel 15 Gram(s) Oral once  dextrose 5%. 1000 milliLiter(s) (50 mL/Hr) IV Continuous <Continuous>  dextrose 5%. 1000 milliLiter(s) (100 mL/Hr) IV Continuous <Continuous>  dextrose 50% Injectable 25 Gram(s) IV Push once  dextrose 50% Injectable 12.5 Gram(s) IV Push once  dextrose 50% Injectable 25 Gram(s) IV Push once  DULoxetine 60 milliGRAM(s) Oral daily  enoxaparin Injectable 90 milliGRAM(s) SubCutaneous two times a day  glucagon  Injectable 1 milliGRAM(s) IntraMuscular once  insulin lispro (ADMELOG) corrective regimen sliding scale   SubCutaneous three times a day before meals  insulin lispro (ADMELOG) corrective regimen sliding scale   SubCutaneous at bedtime  remdesivir  IVPB 100 milliGRAM(s) IV Intermittent every 24 hours  remdesivir  IVPB   IV Intermittent     MEDICATIONS  (PRN):  acetaminophen   Tablet .. 650 milliGRAM(s) Oral every 4 hours PRN Temp greater or equal to 38C (100.4F), Mild Pain (1 - 3)  ALBUTerol    90 MICROgram(s) HFA Inhaler 2 Puff(s) Inhalation every 4 hours PRN Shortness of Breath and/or Wheezing  benzonatate 100 milliGRAM(s) Oral three times a day PRN Cough  guaifenesin/dextromethorphan Oral Liquid 10 milliLiter(s) Oral every 4 hours PRN Cough  ondansetron Injectable 4 milliGRAM(s) IV Push every 8 hours PRN Nausea and/or Vomiting  simethicone 80 milliGRAM(s) Chew four times a day PRN Gas  zolpidem 5 milliGRAM(s) Oral at bedtime PRN Insomnia  zolpidem 5 milliGRAM(s) Oral at bedtime PRN Insomnia    RADIOLOGY & ADDITIONAL TESTS:    [facility]  [facility]                        PROCEDURE DATE:  08/25/2021        INTERPRETATION:  CLINICAL INFORMATION: COVID positive.  Positive d-dimer.    COMPARISON: 11/16/2012    CONTRAST/COMPLICATIONS:  IV Contrast: Omnipaque 350  90 cc administered   10 cc discarded  Oral Contrast: NONE  Complications: None reported at time of study completion    PROCEDURE:  CT Angiography of the Chest.  Sagittal and coronal reformats were performed as well as 3D (MIP) reconstructions.    FINDINGS:    LUNGS AND AIRWAYS: Patent central airways.  There is severe patchy and partially confluent groundglass opacity in both lungs with patchy areas of more dense airspace disease.  A 2.5 x 2 cm right lower lobe nodular opacity appears more nodular and solid  PLEURA: Small pleural effusions bilaterally.  MEDIASTINUM AND DONAVON: There are numerous mediastinal and hilar lymph nodes measuring up to approximately 1.5 cm short access in the subcarinal region (2:58) and 1.4 cm short access in the left hilum (2:51).  There is confluent soft tissue in the right hilum, surrounding the right upper lobe bronchus (2:45; there is no narrowing of the airway.  VESSELS: No pulmonary embolism.  Mild atherosclerosis of the visualized aorta and branch vessels.  HEART: The heart appears mildly enlarged.  Mild to moderate atherosclerotic calcification of the coronary arteries. No pericardial effusion.  CHEST WALL AND LOWER NECK: Within normal limits.  VISUALIZED UPPER ABDOMEN: Small hiatal hernia.  Enlarged spleen measures up to 15.3 cm in the axial plane (2:114.  Multiple left upper pole renal cysts are noted.  BONES: Degenerative changes in the spine.    IMPRESSION:  No pulmonary embolism.    Severe patchy and partially confluent groundglass opacity in both lungs with patchy areas of more dense airspace disease is compatible with with COVID-19 pneumonia.    Small pleural effusions bilaterally.    A 2.5 x 2 cm right lower lobe nodular opacity appears more nodular and solid.  Numerous mediastinal and hilar lymph nodes measure up to 1.5 cm short axis in subcarina region and 1.4 cm short axis in the left hilum.  There is confluent soft tissue in the right hilum, surrounding the right upper lobe bronchus without associated airway narrowing.Underlying malignancy/lung cancer is not excluded.  Repeat chest CT scan is recommended in approximately one month after therapy and resolution of COVID-19 pneumonia two reassess the lung findings.

## 2021-08-30 DIAGNOSIS — I82.409 ACUTE EMBOLISM AND THROMBOSIS OF UNSPECIFIED DEEP VEINS OF UNSPECIFIED LOWER EXTREMITY: ICD-10-CM

## 2021-08-30 LAB
-  AMIKACIN: SIGNIFICANT CHANGE UP
-  AMOXICILLIN/CLAVULANIC ACID: SIGNIFICANT CHANGE UP
-  AMPICILLIN/SULBACTAM: SIGNIFICANT CHANGE UP
-  AMPICILLIN: SIGNIFICANT CHANGE UP
-  AZTREONAM: SIGNIFICANT CHANGE UP
-  CEFAZOLIN: SIGNIFICANT CHANGE UP
-  CEFEPIME: SIGNIFICANT CHANGE UP
-  CEFOXITIN: SIGNIFICANT CHANGE UP
-  CEFTRIAXONE: SIGNIFICANT CHANGE UP
-  CIPROFLOXACIN: SIGNIFICANT CHANGE UP
-  ERTAPENEM: SIGNIFICANT CHANGE UP
-  GENTAMICIN: SIGNIFICANT CHANGE UP
-  IMIPENEM: SIGNIFICANT CHANGE UP
-  LEVOFLOXACIN: SIGNIFICANT CHANGE UP
-  MEROPENEM: SIGNIFICANT CHANGE UP
-  PIPERACILLIN/TAZOBACTAM: SIGNIFICANT CHANGE UP
-  TOBRAMYCIN: SIGNIFICANT CHANGE UP
-  TRIMETHOPRIM/SULFAMETHOXAZOLE: SIGNIFICANT CHANGE UP
ALBUMIN SERPL ELPH-MCNC: 2.4 G/DL — LOW (ref 3.3–5)
ALP SERPL-CCNC: 202 U/L — HIGH (ref 40–120)
ALT FLD-CCNC: 145 U/L — HIGH (ref 12–78)
ANION GAP SERPL CALC-SCNC: 7 MMOL/L — SIGNIFICANT CHANGE UP (ref 5–17)
AST SERPL-CCNC: 101 U/L — HIGH (ref 15–37)
BILIRUB DIRECT SERPL-MCNC: 0.2 MG/DL — SIGNIFICANT CHANGE UP (ref 0–0.2)
BILIRUB INDIRECT FLD-MCNC: 0.4 MG/DL — SIGNIFICANT CHANGE UP (ref 0.2–1)
BILIRUB SERPL-MCNC: 0.6 MG/DL — SIGNIFICANT CHANGE UP (ref 0.2–1.2)
BUN SERPL-MCNC: 22 MG/DL — SIGNIFICANT CHANGE UP (ref 7–23)
CALCIUM SERPL-MCNC: 8.1 MG/DL — LOW (ref 8.5–10.1)
CHLORIDE SERPL-SCNC: 103 MMOL/L — SIGNIFICANT CHANGE UP (ref 96–108)
CO2 SERPL-SCNC: 23 MMOL/L — SIGNIFICANT CHANGE UP (ref 22–31)
CREAT SERPL-MCNC: 0.75 MG/DL — SIGNIFICANT CHANGE UP (ref 0.5–1.3)
CREAT SERPL-MCNC: 0.94 MG/DL — SIGNIFICANT CHANGE UP (ref 0.5–1.3)
GLUCOSE SERPL-MCNC: 135 MG/DL — HIGH (ref 70–99)
HCT VFR BLD CALC: 40.6 % — SIGNIFICANT CHANGE UP (ref 39–50)
HGB BLD-MCNC: 14.1 G/DL — SIGNIFICANT CHANGE UP (ref 13–17)
INR BLD: 1.23 RATIO — HIGH (ref 0.88–1.16)
MCHC RBC-ENTMCNC: 32 PG — SIGNIFICANT CHANGE UP (ref 27–34)
MCHC RBC-ENTMCNC: 34.7 GM/DL — SIGNIFICANT CHANGE UP (ref 32–36)
MCV RBC AUTO: 92.1 FL — SIGNIFICANT CHANGE UP (ref 80–100)
METHOD TYPE: SIGNIFICANT CHANGE UP
PLATELET # BLD AUTO: 266 K/UL — SIGNIFICANT CHANGE UP (ref 150–400)
POTASSIUM SERPL-MCNC: 4 MMOL/L — SIGNIFICANT CHANGE UP (ref 3.5–5.3)
POTASSIUM SERPL-SCNC: 4 MMOL/L — SIGNIFICANT CHANGE UP (ref 3.5–5.3)
PROT SERPL-MCNC: 6.9 GM/DL — SIGNIFICANT CHANGE UP (ref 6–8.3)
PROTHROM AB SERPL-ACNC: 14.1 SEC — HIGH (ref 10.6–13.6)
RBC # BLD: 4.41 M/UL — SIGNIFICANT CHANGE UP (ref 4.2–5.8)
RBC # FLD: 12.8 % — SIGNIFICANT CHANGE UP (ref 10.3–14.5)
SODIUM SERPL-SCNC: 133 MMOL/L — LOW (ref 135–145)
WBC # BLD: 19.03 K/UL — HIGH (ref 3.8–10.5)
WBC # FLD AUTO: 19.03 K/UL — HIGH (ref 3.8–10.5)

## 2021-08-30 PROCEDURE — 99291 CRITICAL CARE FIRST HOUR: CPT

## 2021-08-30 PROCEDURE — 99233 SBSQ HOSP IP/OBS HIGH 50: CPT

## 2021-08-30 RX ORDER — LANOLIN ALCOHOL/MO/W.PET/CERES
5 CREAM (GRAM) TOPICAL AT BEDTIME
Refills: 0 | Status: DISCONTINUED | OUTPATIENT
Start: 2021-08-30 | End: 2021-09-08

## 2021-08-30 RX ORDER — SENNA PLUS 8.6 MG/1
2 TABLET ORAL AT BEDTIME
Refills: 0 | Status: DISCONTINUED | OUTPATIENT
Start: 2021-08-30 | End: 2021-09-10

## 2021-08-30 RX ORDER — REMDESIVIR 5 MG/ML
100 INJECTION INTRAVENOUS EVERY 24 HOURS
Refills: 0 | Status: COMPLETED | OUTPATIENT
Start: 2021-08-30 | End: 2021-09-03

## 2021-08-30 RX ORDER — POLYETHYLENE GLYCOL 3350 17 G/17G
17 POWDER, FOR SOLUTION ORAL DAILY
Refills: 0 | Status: DISCONTINUED | OUTPATIENT
Start: 2021-08-30 | End: 2021-09-10

## 2021-08-30 RX ORDER — DIPHENHYDRAMINE HYDROCHLORIDE AND LIDOCAINE HYDROCHLORIDE AND ALUMINUM HYDROXIDE AND MAGNESIUM HYDRO
15 KIT
Refills: 0 | Status: DISCONTINUED | OUTPATIENT
Start: 2021-08-30 | End: 2021-09-10

## 2021-08-30 RX ADMIN — Medication 4: at 18:50

## 2021-08-30 RX ADMIN — ENOXAPARIN SODIUM 90 MILLIGRAM(S): 100 INJECTION SUBCUTANEOUS at 09:50

## 2021-08-30 RX ADMIN — ENOXAPARIN SODIUM 90 MILLIGRAM(S): 100 INJECTION SUBCUTANEOUS at 22:26

## 2021-08-30 RX ADMIN — DULOXETINE HYDROCHLORIDE 60 MILLIGRAM(S): 30 CAPSULE, DELAYED RELEASE ORAL at 09:50

## 2021-08-30 RX ADMIN — Medication 6 MILLIGRAM(S): at 09:48

## 2021-08-30 RX ADMIN — Medication 100 MILLIGRAM(S): at 09:50

## 2021-08-30 RX ADMIN — Medication 5 MILLIGRAM(S): at 22:28

## 2021-08-30 RX ADMIN — DIPHENHYDRAMINE HYDROCHLORIDE AND LIDOCAINE HYDROCHLORIDE AND ALUMINUM HYDROXIDE AND MAGNESIUM HYDRO 15 MILLILITER(S): KIT at 16:04

## 2021-08-30 RX ADMIN — Medication 4: at 12:02

## 2021-08-30 RX ADMIN — ZOLPIDEM TARTRATE 5 MILLIGRAM(S): 10 TABLET ORAL at 22:29

## 2021-08-30 RX ADMIN — REMDESIVIR 500 MILLIGRAM(S): 5 INJECTION INTRAVENOUS at 22:26

## 2021-08-30 RX ADMIN — POLYETHYLENE GLYCOL 3350 17 GRAM(S): 17 POWDER, FOR SOLUTION ORAL at 18:46

## 2021-08-30 RX ADMIN — AMLODIPINE BESYLATE 5 MILLIGRAM(S): 2.5 TABLET ORAL at 09:50

## 2021-08-30 RX ADMIN — CEFTRIAXONE 2000 MILLIGRAM(S): 500 INJECTION, POWDER, FOR SOLUTION INTRAMUSCULAR; INTRAVENOUS at 09:49

## 2021-08-30 NOTE — PROGRESS NOTE ADULT - ASSESSMENT
72 y/o male with h/o HTN, HLD, skin CA was admitted on 8/25 for worsening shortness of breath, cough, body aches and pains, fever, chills. Pt states symptoms have gotten significantly worse over past two days PTA. In ER, he arrived with oxygen saturation at 86% on RA. Improved to mid 90's on 4L NC. Denies fever or chills at home.     1. COVID-19 viral syndrome. Multifocal pneumonia. Possible superimposed bacterial pneumonia with E.coli. Bacteremia with CNST ?contaminant. Acute respiratory failure. Left leg DVT. Allergy to PCN.   -febrile syndrome  -respiratory frail  -on remdesivir protocol # 5  -tolerating abx well so far; no side effects noted  -repeat BC x 2  -on ceftriaxone 2 gm IV qd # 2  -monitor closely in [place] of PCN allergy history  -O2 therapy  -steroids  -on full AC  -droplet isolation  -respiratory care  -concern for pulmonary malignancy reported by radiologist; will need f/u as outpatient  -continue antiviral and abx therapy  -monitor temps  -f/u CBC  -supportive care  2. Other issues:   -care per medicine

## 2021-08-30 NOTE — PROVIDER CONTACT NOTE (CHANGE IN STATUS NOTIFICATION) - ASSESSMENT
pt unable to prone any longer, could not breathe, sat up in bed went down to 77% on 50L 85% setting on high flow.

## 2021-08-30 NOTE — PROGRESS NOTE ADULT - SUBJECTIVE AND OBJECTIVE BOX
SUBJECTIVE:  RRT this AM for hypxemia.  He is currently on high flow 50L, 100%.  At the time of my exam, sitting   Today oxygen saturation is 96%      ROS otherwise negative.     PHYSICAL EXAMINATION:  GENERAL APPEARANCE:  No distress. No accessory muscle use.   HEAD: Normocephalic atraumatic   EYES: Pupils are normal. No icterus. Sclera white.   HEENT:  Mucus membranes moist. Oropharynx normal.   NECK:  Supple. No JVD.   HEART:  Normal S1 and S2. There are no murmurs appreciated   CHEST:  Clear to ausculation bilaterally. Normal chest excursion. No wheezing. No crackles. No rhonchi   ABDOMEN:  Soft and nontender. Nondistended.   EXTREMITIES:  There is no cyanosis, clubbing or edema.   SKIN:  No rash or significant lesions are noted. No jaundice.  PSYCH: Normal affect.  NEURO: Alert and oriented. Responds to question appropriate. No focal deficits appreciated.     Vital Signs Last 24 Hrs  T(C): 36.8 (29 Aug 2021 08:46), Max: 36.8 (28 Aug 2021 16:32)  T(F): 98.3 (29 Aug 2021 08:46), Max: 98.3 (29 Aug 2021 08:46)  HR: 70 (29 Aug 2021 08:46) (69 - 84)  BP: 135/82 (29 Aug 2021 08:46) (127/75 - 135/82)  BP(mean): 98 (29 Aug 2021 08:46) (98 - 98)  RR: 20 (29 Aug 2021 08:46) (18 - 20)  SpO2: 89% (29 Aug 2021 08:46) (87% - 95%)    MEDICATIONS  (STANDING):  amLODIPine   Tablet 5 milliGRAM(s) Oral daily  cefTRIAXone Injectable. 2000 milliGRAM(s) IV Push every 24 hours  dexAMETHasone  Injectable 6 milliGRAM(s) IV Push daily  dextrose 40% Gel 15 Gram(s) Oral once  dextrose 5%. 1000 milliLiter(s) (50 mL/Hr) IV Continuous <Continuous>  dextrose 5%. 1000 milliLiter(s) (100 mL/Hr) IV Continuous <Continuous>  dextrose 50% Injectable 25 Gram(s) IV Push once  dextrose 50% Injectable 12.5 Gram(s) IV Push once  dextrose 50% Injectable 25 Gram(s) IV Push once  DULoxetine 60 milliGRAM(s) Oral daily  enoxaparin Injectable 90 milliGRAM(s) SubCutaneous two times a day  glucagon  Injectable 1 milliGRAM(s) IntraMuscular once  insulin lispro (ADMELOG) corrective regimen sliding scale   SubCutaneous at bedtime  insulin lispro (ADMELOG) corrective regimen sliding scale   SubCutaneous three times a day before meals  remdesivir  IVPB 100 milliGRAM(s) IV Intermittent every 24 hours  remdesivir  IVPB   IV Intermittent     MEDICATIONS  (PRN):  acetaminophen   Tablet .. 650 milliGRAM(s) Oral every 4 hours PRN Temp greater or equal to 38C (100.4F), Mild Pain (1 - 3)  ALBUTerol    90 MICROgram(s) HFA Inhaler 2 Puff(s) Inhalation every 4 hours PRN Shortness of Breath and/or Wheezing  benzonatate 100 milliGRAM(s) Oral three times a day PRN Cough  guaifenesin/dextromethorphan Oral Liquid 10 milliLiter(s) Oral every 4 hours PRN Cough  ondansetron Injectable 4 milliGRAM(s) IV Push every 8 hours PRN Nausea and/or Vomiting  simethicone 80 milliGRAM(s) Chew four times a day PRN Gas  zolpidem 5 milliGRAM(s) Oral at bedtime PRN Insomnia  zolpidem 5 milliGRAM(s) Oral at bedtime PRN Insomnia      Allergies    penicillin (Unknown)    Intolerances          LABS:                        13.9   18.34 )-----------( 210      ( 29 Aug 2021 08:12 )             39.2     08-29    135  |  103  |  24<H>  ----------------------------<  127<H>  4.1   |  26  |  0.75    Ca    8.2<L>      29 Aug 2021 08:12    TPro  5.9<L>  /  Alb  2.4<L>  /  TBili  0.8  /  DBili  x   /  AST  91<H>  /  ALT  139<H>  /  AlkPhos  167<H>  08-28    LIVER FUNCTIONS - ( 28 Aug 2021 08:21 )  Alb: 2.4 g/dL / Pro: 5.9 gm/dL / ALK PHOS: 167 U/L / ALT: 139 U/L / AST: 91 U/L / GGT: x           PT/INR - ( 28 Aug 2021 08:21 )   PT: 14.1 sec;   INR: 1.22 ratio         PTT - ( 28 Aug 2021 08:21 )  PTT:25.1 sec          RADIOLOGY & ADDITIONAL STUDIES:   *Imaging personally reviewed.       RADIOLOGY & ADDITIONAL STUDIES:   *images personally reviewed.   CT Chest 8/25/21 -   IMPRESSION:  No pulmonary embolism.  Severe patchy and partially confluent groundglass opacity in both lungs with patchy areas of more dense airspace disease is compatible with with COVID-19 pneumonia.  Small pleural effusions bilaterally.  A 2.5 x 2 cm right lower lobe nodular opacity appears more nodular and solid. Numerous mediastinal and hilar lymph nodes measure up to 1.5 cm short axis in subcarina region and 1.4 cm short axis in the left hilum. There is confluent soft tissue in the right hilum, surrounding the right upper lobe bronchus without associated airway narrowing. Underlying malignancy/lung cancer is not excluded. Repeat chest CT scan is recommended in approximately one month after therapy and resolution of COVID-19 pneumonia two reassess the lung findings.       SUBJECTIVE:  RRT this AM for hypxemia.  He is currently on high flow 50L, 100%. with oxygen saturation of 100%  At the time of my exam, sitting upright in bed.   Breathing improved.   He complains of constipation and mouth soars.  He is eating well.   No other complaints.   He is unable to tolerate prone postioning     ROS otherwise negative.     PHYSICAL EXAMINATION:  GENERAL APPEARANCE:  No distress. Slight increased resp.effort   HEENT:  Ulcers in mouth. Mucus membranes moist.   NECK:  Supple. No JVD.   CHEST:  Normal chest excursion. No audible wheezing.   ABDOMEN:  Soft, non-distended.   EXTREMITIES:  There is no cyanosis, clubbing or edema.   SKIN:  No rash or significant lesions are noted. No jaundice.  PSYCH: Normal affect.  NEURO: Alert and oriented. Responds to question appropriate. No focal deficits appreciated.     Vital Signs Last 24 Hrs  T(C): 36.4 (30 Aug 2021 09:10), Max: 37.1 (29 Aug 2021 15:08)  T(F): 97.6 (30 Aug 2021 09:10), Max: 98.8 (29 Aug 2021 15:08)  HR: 76 (30 Aug 2021 09:10) (73 - 81)  BP: 110/67 (30 Aug 2021 09:10) (110/67 - 142/70)  BP(mean): 81 (30 Aug 2021 09:10) (81 - 81)  RR: 21 (30 Aug 2021 09:10) (17 - 24)  SpO2: 100% (30 Aug 2021 09:10) (88% - 100%)    MEDICATIONS  (STANDING):  amLODIPine   Tablet 5 milliGRAM(s) Oral daily  cefTRIAXone Injectable. 2000 milliGRAM(s) IV Push every 24 hours  dexAMETHasone  Injectable 6 milliGRAM(s) IV Push daily  dextrose 40% Gel 15 Gram(s) Oral once  dextrose 5%. 1000 milliLiter(s) (50 mL/Hr) IV Continuous <Continuous>  dextrose 5%. 1000 milliLiter(s) (100 mL/Hr) IV Continuous <Continuous>  dextrose 50% Injectable 25 Gram(s) IV Push once  dextrose 50% Injectable 12.5 Gram(s) IV Push once  dextrose 50% Injectable 25 Gram(s) IV Push once  DULoxetine 60 milliGRAM(s) Oral daily  enoxaparin Injectable 90 milliGRAM(s) SubCutaneous two times a day  glucagon  Injectable 1 milliGRAM(s) IntraMuscular once  insulin lispro (ADMELOG) corrective regimen sliding scale   SubCutaneous three times a day before meals  insulin lispro (ADMELOG) corrective regimen sliding scale   SubCutaneous at bedtime  remdesivir  IVPB 100 milliGRAM(s) IV Intermittent every 24 hours        LABS:                        14.1   19.03 )-----------( 266      ( 30 Aug 2021 08:55 )             40.6     08-30    133<L>  |  103  |  22  ----------------------------<  135<H>  4.0   |  23  |  0.75    Ca    8.1<L>      30 Aug 2021 08:55        RADIOLOGY & ADDITIONAL STUDIES:   *images personally reviewed.   CT Chest 8/25/21 -   IMPRESSION:  No pulmonary embolism.  Severe patchy and partially confluent groundglass opacity in both lungs with patchy areas of more dense airspace disease is compatible with with COVID-19 pneumonia.  Small pleural effusions bilaterally.  A 2.5 x 2 cm right lower lobe nodular opacity appears more nodular and solid. Numerous mediastinal and hilar lymph nodes measure up to 1.5 cm short axis in subcarina region and 1.4 cm short axis in the left hilum. There is confluent soft tissue in the right hilum, surrounding the right upper lobe bronchus without associated airway narrowing. Underlying malignancy/lung cancer is not excluded. Repeat chest CT scan is recommended in approximately one month after therapy and resolution of COVID-19 pneumonia two reassess the lung findings.    LE Doppler  IMPRESSION:    Below-the-knee left deep vein thrombosis involving the gastrocnemius and posterior tibial veins.

## 2021-08-30 NOTE — PROGRESS NOTE ADULT - SUBJECTIVE AND OBJECTIVE BOX
CC: COVID     HPI:  Pt is a 72 yo male with a pmh/o HTN, HLD, skin CA who is a smoker and who quit smoking two weeks ago due to malaise, who presents to ED today due to worsening shortness of breath, cough, body aches and pains, fever, chills. Pt states symptoms have gotten significantly worse over past two days. Pt is not COVID vaccinated. Arrived with oxygen saturation at 86% on RA. Improved to mid 90's on 4L NC. Denies cp, palpitations, n/v/d, abd pain, constipation, rash, leg swelling, calf pain, dysuria, hematuria, sputum production, hemoptysis, HA.      INTERVAL HPI/ OVERNIGHT EVENTS: Pt was seen and examined,    was hypoxic this am, didn't tolerate pronning but pulse ox improved  sitting up OOB to chair. Pt reports feeling better, SOB improved, had his breakfast . States that cough is better. Doppler results  and Tx discussed       Vital Signs Last 24 Hrs  T(C): 36.9 (30 Aug 2021 16:12), Max: 36.9 (30 Aug 2021 16:12)  T(F): 98.4 (30 Aug 2021 16:12), Max: 98.4 (30 Aug 2021 16:12)  HR: 84 (30 Aug 2021 16:12) (73 - 84)  BP: 124/70 (30 Aug 2021 16:12) (110/67 - 142/70)  BP(mean): 85 (30 Aug 2021 16:12) (81 - 85)  RR: 18 (30 Aug 2021 16:12) (17 - 24)  SpO2: 99% (30 Aug 2021 16:12) (88% - 100%)    REVIEW OF SYSTEMS:  All other review of systems is negative unless indicated above.      PHYSICAL EXAM:  General: Well developed: looks acutely ill,  NAD on HFNC  Eyes: EOMI; conjunctiva and sclera clear  Head: Normocephalic; atraumatic  ENMT: No nasal discharge; airway clear  Neck: Supple; non tender; no masses  Respiratory: Better air entry b.l, but still BS decreased,  No wheezes, rales or rhonchi  Cardiovascular: Regular rate and rhythm. S1 and S2 Normal;   Gastrointestinal: Soft , mildly distended, mildly tender at L lower abd, + bowel sounds  Genitourinary: No  suprapubic  tenderness  Extremities: No LE  edema  Vascular: Peripheral pulses palpable 2+ bilaterally  Neurological: Alert and oriented x3, non focal   Musculoskeletal: Normal muscle tone and strength   Psychiatric: Cooperative        LABS:                           14.1   19.03 )-----------( 266      ( 30 Aug 2021 08:55 )             40.6     08-30    x   |  x   |  x   ----------------------------<  x   x    |  x   |  0.94    Ca    8.1<L>      30 Aug 2021 08:55    TPro  6.9  /  Alb  2.4<L>  /  TBili  0.6  /  DBili  0.2  /  AST  101<H>  /  ALT  145<H>  /  AlkPhos  202<H>  08-30  LIVER FUNCTIONS - ( 30 Aug 2021 12:24 )  Alb: 2.4 g/dL / Pro: 6.9 gm/dL / ALK PHOS: 202 U/L / ALT: 145 U/L / AST: 101 U/L / GGT: x           PT/INR - ( 30 Aug 2021 12:24 )   PT: 14.1 sec;   INR: 1.23 ratio                                13.9   18.34 )-----------( 210      ( 29 Aug 2021 08:12 )             39.2     08-29    135  |  103  |  24<H>  ----------------------------<  127<H>  4.1   |  26  |  0.75    Ca    8.2<L>      29 Aug 2021 08:12                          13.1   17.99 )-----------( 225      ( 28 Aug 2021 08:21 )             36.9     08-28    133<L>  |  102  |  21  ----------------------------<  124<H>  3.9   |  24  |  0.78    Ca    7.7<L>      28 Aug 2021 08:21    TPro  5.9<L>  /  Alb  2.4<L>  /  TBili  0.8  /  DBili  x   /  AST  91<H>  /  ALT  139<H>  /  AlkPhos  167<H>  08-2  LIVER FUNCTIONS - ( 28 Aug 2021 08:21 )  Alb: 2.4 g/dL / Pro: 5.9 gm/dL / ALK PHOS: 167 U/L / ALT: 139 U/L / AST: 91 U/L / GGT: x           PT/INR - ( 28 Aug 2021 08:21 )   PT: 14.1 sec;   INR: 1.22 ratio      PTT - ( 28 Aug 2021 08:21 )  PTT:25.1 sec      CARDIAC MARKERS ( 25 Aug 2021 20:49 )  <0.015 ng/mL / x     / 78 U/L / x     / x                                12.6   8.64  )-----------( 230      ( 26 Aug 2021 08:43 )             36.2     26 Aug 2021 08:43    137    |  108    |  19     ----------------------------<  269    3.6     |  22     |  0.89     Ca    8.3        26 Aug 2021 08:43    TPro  6.1    /  Alb  2.1    /  TBili  0.8    /  DBili  x      /  AST  57     /  ALT  78     /  AlkPhos  133    26 Aug 2021 08:43    PT/INR - ( 25 Aug 2021 20:49 )   PT: 14.0 sec;   INR: 1.22 ratio    PTT - ( 25 Aug 2021 20:49 )  PTT:25.0 sec      LIVER FUNCTIONS - ( 26 Aug 2021 08:43 )  Alb: 2.1 g/dL / Pro: 6.1 gm/dL / ALK PHOS: 133 U/L / ALT: 78 U/L / AST: 57 U/L / GGT: x               Culture - Blood (08.25.21 @ 20:49)   - Coagulase negative Staphylococcus: Detec   Gram Stain:   Growth in aerobic bottle: Gram Positive Cocci in Clusters   Growth in anaerobic bottle: Gram Positive Cocci in Clusters   Specimen Source: .Blood None   Organism: Blood Culture PCR   Culture Results:   Growth in aerobic and anaerobic bottles: Staphylococcus hominis   Coag Negative Staphylococcus   Single set isolate, possible contaminant.     Culture - Sputum . (08.27.21 @ 05:21)   Gram Stain:   Few polymorphonuclear leukocytes per low power field   Few Squamous epithelial cells per low power field   Moderate Gram Positive Cocci in Clusters per oil power field   Few Gram Negative Rods per oil power field   Few Gram Positive Rods per oil power field   Few Gram Negative Diplococci per oil power field   - Amikacin: S <=16   - Amoxicillin/Clavulanic Acid: S <=8/4   - Ampicillin: S <=8 These ampicillin results predict results for amoxicillin   - Ampicillin/Sulbactam: S <=4/2 Enterobacter, Citrobacter, and Serratia may develop resistance during prolonged therapy (3-4 days)   - Aztreonam: S <=4   - Cefazolin: S <=2 Enterobacter, Citrobacter, and Serratia may develop resistance during prolonged therapy (3-4 days)   - Cefepime: S <=2   - Cefoxitin: S <=8   - Ceftriaxone: S <=1 Enterobacter, Citrobacter, and Serratia may develop resistance during prolonged therapy   - Ciprofloxacin: S <=0.25   - Ertapenem: S <=0.5   - Gentamicin: S <=2   - Imipenem: S <=1   - Levofloxacin: S <=0.5   - Meropenem: S <=1   - Piperacillin/Tazobactam: S <=8   - Tobramycin: S <=2   - Trimethoprim/Sulfamethoxazole: S <=0.5/9.5   Specimen Source: .Sputum Sputum   Culture Results:   Moderate Escherichia coli   Normal Respiratory Celine present   Organism Identification: Escherichia coli   Organism: Escherichia coli   Method Type: [facility]     MEDICATIONS  (STANDING):  amLODIPine   Tablet 5 milliGRAM(s) Oral daily  cefTRIAXone Injectable. 2000 milliGRAM(s) IV Push every 24 hours  dexAMETHasone  Injectable 6 milliGRAM(s) IV Push daily  dextrose 40% Gel 15 Gram(s) Oral once  dextrose 5%. 1000 milliLiter(s) (50 mL/Hr) IV Continuous <Continuous>  dextrose 5%. 1000 milliLiter(s) (100 mL/Hr) IV Continuous <Continuous>  dextrose 50% Injectable 25 Gram(s) IV Push once  dextrose 50% Injectable 12.5 Gram(s) IV Push once  dextrose 50% Injectable 25 Gram(s) IV Push once  DULoxetine 60 milliGRAM(s) Oral daily  enoxaparin Injectable 90 milliGRAM(s) SubCutaneous two times a day  glucagon  Injectable 1 milliGRAM(s) IntraMuscular once  insulin lispro (ADMELOG) corrective regimen sliding scale   SubCutaneous three times a day before meals  insulin lispro (ADMELOG) corrective regimen sliding scale   SubCutaneous at bedtime  remdesivir  IVPB 100 milliGRAM(s) IV Intermittent every 24 hours    MEDICATIONS  (PRN):  acetaminophen   Tablet .. 650 milliGRAM(s) Oral every 4 hours PRN Temp greater or equal to 38C (100.4F), Mild Pain (1 - 3)  ALBUTerol    90 MICROgram(s) HFA Inhaler 2 Puff(s) Inhalation every 4 hours PRN Shortness of Breath and/or Wheezing  benzonatate 100 milliGRAM(s) Oral three times a day PRN Cough  FIRST- Mouthwash  BLM 15 milliLiter(s) Swish and Spit four times a day PRN Mouth Care  guaifenesin/dextromethorphan Oral Liquid 10 milliLiter(s) Oral every 4 hours PRN Cough  ondansetron Injectable 4 milliGRAM(s) IV Push every 8 hours PRN Nausea and/or Vomiting  simethicone 80 milliGRAM(s) Chew four times a day PRN Gas  zolpidem 5 milliGRAM(s) Oral at bedtime PRN Insomnia  zolpidem 5 milliGRAM(s) Oral at bedtime PRN Insomnia      RADIOLOGY & ADDITIONAL TESTS:    [facility]                        PROCEDURE DATE:  08/25/2021        INTERPRETATION:  CLINICAL INFORMATION: COVID positive.  Positive d-dimer.    COMPARISON: 11/16/2012    CONTRAST/COMPLICATIONS:  IV Contrast: Omnipaque 350  90 cc administered   10 cc discarded  Oral Contrast: NONE  Complications: None reported at time of study completion    PROCEDURE:  CT Angiography of the Chest.  Sagittal and coronal reformats were performed as well as 3D (MIP) reconstructions.    FINDINGS:    LUNGS AND AIRWAYS: Patent central airways.  There is severe patchy and partially confluent groundglass opacity in both lungs with patchy areas of more dense airspace disease.  A 2.5 x 2 cm right lower lobe nodular opacity appears more nodular and solid  PLEURA: Small pleural effusions bilaterally.  MEDIASTINUM AND DONAVON: There are numerous mediastinal and hilar lymph nodes measuring up to approximately 1.5 cm short access in the subcarinal region (2:58) and 1.4 cm short access in the left hilum (2:51).  There is confluent soft tissue in the right hilum, surrounding the right upper lobe bronchus (2:45; there is no narrowing of the airway.  VESSELS: No pulmonary embolism.  Mild atherosclerosis of the visualized aorta and branch vessels.  HEART: The heart appears mildly enlarged.  Mild to moderate atherosclerotic calcification of the coronary arteries. No pericardial effusion.  CHEST WALL AND LOWER NECK: Within normal limits.  VISUALIZED UPPER ABDOMEN: Small hiatal hernia.  Enlarged spleen measures up to 15.3 cm in the axial plane (2:114.  Multiple left upper pole renal cysts are noted.  BONES: Degenerative changes in the spine.    IMPRESSION:  No pulmonary embolism.    Severe patchy and partially confluent groundglass opacity in both lungs with patchy areas of more dense airspace disease is compatible with with COVID-19 pneumonia.    Small pleural effusions bilaterally.    A 2.5 x 2 cm right lower lobe nodular opacity appears more nodular and solid.  Numerous mediastinal and hilar lymph nodes measure up to 1.5 cm short axis in subcarina region and 1.4 cm short axis in the left hilum.  There is confluent soft tissue in the right hilum, surrounding the right upper lobe bronchus without associated airway narrowing.Underlying malignancy/lung cancer is not excluded.  Repeat chest CT scan is recommended in approximately one month after therapy and resolution of COVID-19 pneumonia two reassess the lung findings.

## 2021-08-30 NOTE — PROGRESS NOTE ADULT - ASSESSMENT
71 year-old man, active smoker, presents with fever, cough, shortness of breath, found to have hypoxemia and infiltrates on imaging consistent with COVID pneumonia.   --CT chest has dense ground glass opacities diffusely, likely related to COVID. Noted by radiology is nodular opacity in RLL and mediastinal adenopathy - this may represent malignancy but given acute infiltrates I am not able to see what is acute from chronic.   --He appears slightly improved since yesterday - but clinical status still tenuous     Recommendations:  --c/w high flow nasal cannula   --Prone positioning as tolerated.   --c/w treatment for COVID with decadron, remdesivir, and supportive care.  --He will need repeat CT Chest approx 4-6 weeks after this CT chest to follow up these abnormalities once the acute infection has improved.     Will follow.  71 year-old man, active smoker, with COVID pneumonia and ARDS.  Additionally has noted by radiology to have nodular opacity in RLL and mediastinal adenopathy   Also found to have LE DVT    Recommendations:  --c/w high flow nasal cannula   --c/w treatment for COVID with decadron, remdesivir, and supportive care.  --On full dose lovenox  --Also started on antibitoics/ceftriaxone    Will follow.  71 year-old man, active smoker, with COVID pneumonia and ARDS.  Additionally has noted by radiology to have nodular opacity in RLL and mediastinal adenopathy   Also found to have LE DVT    Recommendations:  --c/w high flow nasal cannula   --c/w treatment for COVID with decadron, remdesivir, and supportive care.  --On full dose lovenox  --Also started on antibitoics/ceftriaxone  --started on magic mouthwash mouth soars/apthous ulcers/     Will follow.

## 2021-08-30 NOTE — PROVIDER CONTACT NOTE (CHANGE IN STATUS NOTIFICATION) - BACKGROUND
at start of shift patient desatting into 80s, proned with chest PT performed by night RN, satting in low 90's proned.

## 2021-08-30 NOTE — PROGRESS NOTE ADULT - ASSESSMENT
Pt is a 70 yo male with a pmh/o HTN, HLD, skin CA admitted for:         #Acute hypoxic respiratory Failure  secondary to COVID 19 PNA,  superimposed E.COLI PNA, also suspected underline mass and Mediastinal LAD  - CTA chest: neg for PE, extensive pulm infiltrates, with RLL nodularity and mediastinal mass, possible Lung  malignancy   - Maintain on airborne isolation.  - Continue with O2 via HF nasal cannula,  up titrated  to 50/100    keep O2 sats >92%  - continuous pulse ox  - Acetaminophen 650 mg PO q4h PRN fever  - HFA albuterol Q6 hour PRN   - C/w  Dexamethasone & Remdesivir Day 6  - C/w Ceftriaxone IV   -Mucinex  - F/u sputum Cx  -  trend proinflammatory markers   -will need f/u CT to follow up on infiltrates and possible mass  in 4-6 weeks   D/w Dr Delvalle and Dr Oshea      # Hyperglycemia  - HB A1c 6.3, pre diabetes   - BS elevated 2/2 dexamethasone  - Monitor BS, cover with ISS       #Hypokalemia  -repleted   - monitor     #HTN  - monitor BP, stable  - on amlodipine     # Constipation  CT abd neg for obstruction  bowel regiment        #  Coagulase negative Staph Bacteremia.    BCX x 1 set:  + Coagulase neg staph, possible contamination   resend Cx, pending results   C/w IV  Rocephin   D/w DR Oshea        # Acute LLE below the knee DVT  - as pt is high risk and cant r/o PE ( Pt is on HFNC) c/w  Full dose A/c  - Lovenox 90mg SQ BID      #Skin CA  Pt may use own 5FU cream topically as directed    # DVT PPX: Lovenox SQ    Dispo: monitor closely,  respiratory status frail, high risk of intubation

## 2021-08-30 NOTE — PROGRESS NOTE ADULT - SUBJECTIVE AND OBJECTIVE BOX
Date of service: 08-30-21 @ 13:11    Lying in bed in NAD  Hypoxia is worse  Comfortable at rest  Has dry cough    ROS: no fever or chills; poorly verbal    MEDICATIONS  (STANDING):  amLODIPine   Tablet 5 milliGRAM(s) Oral daily  cefTRIAXone Injectable. 2000 milliGRAM(s) IV Push every 24 hours  dexAMETHasone  Injectable 6 milliGRAM(s) IV Push daily  dextrose 40% Gel 15 Gram(s) Oral once  dextrose 5%. 1000 milliLiter(s) (50 mL/Hr) IV Continuous <Continuous>  dextrose 5%. 1000 milliLiter(s) (100 mL/Hr) IV Continuous <Continuous>  dextrose 50% Injectable 25 Gram(s) IV Push once  dextrose 50% Injectable 12.5 Gram(s) IV Push once  dextrose 50% Injectable 25 Gram(s) IV Push once  DULoxetine 60 milliGRAM(s) Oral daily  enoxaparin Injectable 90 milliGRAM(s) SubCutaneous two times a day  glucagon  Injectable 1 milliGRAM(s) IntraMuscular once  insulin lispro (ADMELOG) corrective regimen sliding scale   SubCutaneous three times a day before meals  insulin lispro (ADMELOG) corrective regimen sliding scale   SubCutaneous at bedtime  remdesivir  IVPB 100 milliGRAM(s) IV Intermittent every 24 hours    Vital Signs Last 24 Hrs  T(C): 36.4 (30 Aug 2021 09:10), Max: 37.1 (29 Aug 2021 15:08)  T(F): 97.6 (30 Aug 2021 09:10), Max: 98.8 (29 Aug 2021 15:08)  HR: 76 (30 Aug 2021 09:10) (73 - 81)  BP: 110/67 (30 Aug 2021 09:10) (110/67 - 142/70)  BP(mean): 81 (30 Aug 2021 09:10) (81 - 81)  RR: 21 (30 Aug 2021 09:10) (17 - 24)  SpO2: 100% (30 Aug 2021 09:10) (88% - 100%)     Physical exam:    Constitutional:  No acute distress  HEENT: NC/AT, EOMI, PERRLA, conjunctivae clear  Neck: supple; thyroid not palpable  Back: no tenderness  Respiratory: respiratory effort normal; crackles at bases  Cardiovascular: S1S2 regular, no murmurs  Abdomen: soft, not tender, not distended, positive BS  Genitourinary: no suprapubic tenderness  Lymphatic: no LN palpable  Musculoskeletal: no muscle tenderness, no joint swelling or tenderness  Extremities: no pedal edema  Neurological/ Psychiatric: AxOx3, moving all extremities  Skin: no rashes; no palpable lesions    Labs: reviewed                        14.1   19.03 )-----------( 266      ( 30 Aug 2021 08:55 )             40.6     08-30    x   |  x   |  x   ----------------------------<  x   x    |  x   |  0.94    Ca    8.1<L>      30 Aug 2021 08:55    TPro  6.9  /  Alb  2.4<L>  /  TBili  0.6  /  DBili  0.2  /  AST  101<H>  /  ALT  145<H>  /  AlkPhos  202<H>  08-30    D-Dimer Assay, Quantitative: 9326 ng/mL DDU (08-29-21 @ 08:12)  D-Dimer Assay, Quantitative: 5563 ng/mL DDU (08-28-21 @ 08:21)  C-Reactive Protein, Serum: 224 mg/L (08-25-21 @ 20:49)  D-Dimer Assay, Quantitative: 1819 ng/mL DDU (08-25-21 @ 20:49)  Ferritin, Serum: 1675 ng/mL (08-25-21 @ 20:49)                        12.5   22.44 )-----------( 298      ( 27 Aug 2021 08:53 )             35.3     08-27    138  |  106  |  19  ----------------------------<  143<H>  3.9   |  24  |  0.72    Ca    8.4<L>      27 Aug 2021 08:53    TPro  6.1  /  Alb  2.1<L>  /  TBili  0.8  /  DBili  x   /  AST  57<H>  /  ALT  78  /  AlkPhos  133<H>  08-26        C-Reactive Protein, Serum: 224 mg/L (08-25-21 @ 20:49)  D-Dimer Assay, Quantitative: 1819 ng/mL DDU (08-25-21 @ 20:49)  Ferritin, Serum: 1675 ng/mL (08-25-21 @ 20:49)                        12.6   8.64  )-----------( 230      ( 26 Aug 2021 08:43 )             36.2     08-26    Culture - Sputum (collected 27 Aug 2021 05:21)  Source: .Sputum Sputum  Gram Stain (28 Aug 2021 13:37):    Few polymorphonuclear leukocytes per low power field    Few Squamous epithelial cells per low power field    Moderate Gram Positive Cocci in Clusters per oil power field    Few Gram Negative Rods per oil power field    Few Gram Positive Rods per oil power field    Few Gram Negative Diplococci per oil power field  Preliminary Report (30 Aug 2021 09:39):    Moderate Escherichia coli    Normal Respiratory Celine present  Organism: Escherichia coli (30 Aug 2021 09:38)  Organism: Escherichia coli (30 Aug 2021 09:38)      -  Amikacin: S <=16      -  Amoxicillin/Clavulanic Acid: S <=8/4      -  Ampicillin: S <=8 These ampicillin results predict results for amoxicillin      -  Ampicillin/Sulbactam: S <=4/2 Enterobacter, Citrobacter, and Serratia may develop resistance during prolonged therapy (3-4 days)      -  Aztreonam: S <=4      -  Cefazolin: S <=2 Enterobacter, Citrobacter, and Serratia may develop resistance during prolonged therapy (3-4 days)      -  Cefepime: S <=2      -  Cefoxitin: S <=8      -  Ceftriaxone: S <=1 Enterobacter, Citrobacter, and Serratia may develop resistance during prolonged therapy      -  Ciprofloxacin: S <=0.25      -  Ertapenem: S <=0.5      -  Gentamicin: S <=2      -  Imipenem: S <=1      -  Levofloxacin: S <=0.5      -  Meropenem: S <=1      -  Piperacillin/Tazobactam: S <=8      -  Tobramycin: S <=2      -  Trimethoprim/Sulfamethoxazole: S <=0.5/9.5      Method Type: CRISTINA    Culture - Blood (collected 25 Aug 2021 20:49)  Source: .Blood None  Gram Stain (26 Aug 2021 19:21):    Growth in aerobic bottle: Gram Positive Cocci in Clusters    Growth in anaerobic bottle: Gram Positive Cocci in Clusters  Final Report (27 Aug 2021 17:40):    Growth in aerobic and anaerobic bottles: Staphylococcus hominis    Coag Negative Staphylococcus    Single set isolate, possible contaminant. Contact    Microbiology if susceptibility testing clinically    indicated.  Organism: Blood Culture PCR (27 Aug 2021 17:40)      -  Coagulase negative Staphylococcus: Detec      Method Type: PCR      137  |  108  |  19  ----------------------------<  269<H>  3.6   |  22  |  0.89    Ca    8.3<L>      26 Aug 2021 08:43    TPro  6.1  /  Alb  2.1<L>  /  TBili  0.8  /  DBili  x   /  AST  57<H>  /  ALT  78  /  AlkPhos  133<H>  08-26     LIVER FUNCTIONS - ( 26 Aug 2021 08:43 )  Alb: 2.1 g/dL / Pro: 6.1 gm/dL / ALK PHOS: 133 U/L / ALT: 78 U/L / AST: 57 U/L / GGT: x           COVID (08-25 @ 20:49)  Detected        Radiology: all available radiological tests reviewed    < from: CT Angio Chest PE Protocol w/ IV Cont (08.25.21 @ 22:53) >  No pulmonary embolism.  Severe patchy and partially confluent groundglass opacity in both lungs with patchy areas of more dense airspace disease is compatible with with COVID-19 pneumonia.  Small pleural effusions bilaterally.  A 2.5 x 2 cm right lower lobe nodular opacity appears more nodular and solid.  Numerous mediastinal and hilar lymph nodes measure up to 1.5 cm short axis in subcarina region and 1.4 cm short axis in the left hilum.  There is confluent soft tissue in the right hilum, surrounding the right upper lobe bronchus without associated airway narrowing.Underlying malignancy/lung cancer is not excluded.  Repeat chest CT scan is recommended in approximately one month after therapy and resolution of COVID-19 pneumonia two reassess the lung findings.  < end of copied text >    < from: US Duplex Venous Lower Ext Complete, Bilateral (08.29.21 @ 08:51) >  Below-the-knee left deep veinthrombosis involving the gastrocnemius and posterior tibial veins.  < end of copied text >      Advanced directives addressed: full resuscitation

## 2021-08-31 LAB
ANION GAP SERPL CALC-SCNC: 7 MMOL/L — SIGNIFICANT CHANGE UP (ref 5–17)
BUN SERPL-MCNC: 26 MG/DL — HIGH (ref 7–23)
CALCIUM SERPL-MCNC: 8.3 MG/DL — LOW (ref 8.5–10.1)
CHLORIDE SERPL-SCNC: 102 MMOL/L — SIGNIFICANT CHANGE UP (ref 96–108)
CO2 SERPL-SCNC: 24 MMOL/L — SIGNIFICANT CHANGE UP (ref 22–31)
CREAT SERPL-MCNC: 0.79 MG/DL — SIGNIFICANT CHANGE UP (ref 0.5–1.3)
CULTURE RESULTS: SIGNIFICANT CHANGE UP
GLUCOSE SERPL-MCNC: 138 MG/DL — HIGH (ref 70–99)
HCT VFR BLD CALC: 38.9 % — LOW (ref 39–50)
HGB BLD-MCNC: 13.4 G/DL — SIGNIFICANT CHANGE UP (ref 13–17)
INR BLD: 1.16 RATIO — SIGNIFICANT CHANGE UP (ref 0.88–1.16)
MCHC RBC-ENTMCNC: 31.8 PG — SIGNIFICANT CHANGE UP (ref 27–34)
MCHC RBC-ENTMCNC: 34.4 GM/DL — SIGNIFICANT CHANGE UP (ref 32–36)
MCV RBC AUTO: 92.4 FL — SIGNIFICANT CHANGE UP (ref 80–100)
ORGANISM # SPEC MICROSCOPIC CNT: SIGNIFICANT CHANGE UP
ORGANISM # SPEC MICROSCOPIC CNT: SIGNIFICANT CHANGE UP
PLATELET # BLD AUTO: 312 K/UL — SIGNIFICANT CHANGE UP (ref 150–400)
POTASSIUM SERPL-MCNC: 4.6 MMOL/L — SIGNIFICANT CHANGE UP (ref 3.5–5.3)
POTASSIUM SERPL-SCNC: 4.6 MMOL/L — SIGNIFICANT CHANGE UP (ref 3.5–5.3)
PROTHROM AB SERPL-ACNC: 13.5 SEC — SIGNIFICANT CHANGE UP (ref 10.6–13.6)
RBC # BLD: 4.21 M/UL — SIGNIFICANT CHANGE UP (ref 4.2–5.8)
RBC # FLD: 13 % — SIGNIFICANT CHANGE UP (ref 10.3–14.5)
SODIUM SERPL-SCNC: 133 MMOL/L — LOW (ref 135–145)
SPECIMEN SOURCE: SIGNIFICANT CHANGE UP
WBC # BLD: 20.79 K/UL — HIGH (ref 3.8–10.5)
WBC # FLD AUTO: 20.79 K/UL — HIGH (ref 3.8–10.5)

## 2021-08-31 PROCEDURE — 99233 SBSQ HOSP IP/OBS HIGH 50: CPT

## 2021-08-31 RX ORDER — INSULIN LISPRO 100/ML
3 VIAL (ML) SUBCUTANEOUS
Refills: 0 | Status: DISCONTINUED | OUTPATIENT
Start: 2021-08-31 | End: 2021-09-10

## 2021-08-31 RX ORDER — INSULIN GLARGINE 100 [IU]/ML
4 INJECTION, SOLUTION SUBCUTANEOUS AT BEDTIME
Refills: 0 | Status: DISCONTINUED | OUTPATIENT
Start: 2021-08-31 | End: 2021-09-10

## 2021-08-31 RX ADMIN — Medication 5 MILLIGRAM(S): at 21:09

## 2021-08-31 RX ADMIN — DULOXETINE HYDROCHLORIDE 60 MILLIGRAM(S): 30 CAPSULE, DELAYED RELEASE ORAL at 09:34

## 2021-08-31 RX ADMIN — ENOXAPARIN SODIUM 90 MILLIGRAM(S): 100 INJECTION SUBCUTANEOUS at 21:09

## 2021-08-31 RX ADMIN — ZOLPIDEM TARTRATE 5 MILLIGRAM(S): 10 TABLET ORAL at 21:12

## 2021-08-31 RX ADMIN — CEFTRIAXONE 2000 MILLIGRAM(S): 500 INJECTION, POWDER, FOR SOLUTION INTRAMUSCULAR; INTRAVENOUS at 09:34

## 2021-08-31 RX ADMIN — ENOXAPARIN SODIUM 90 MILLIGRAM(S): 100 INJECTION SUBCUTANEOUS at 09:32

## 2021-08-31 RX ADMIN — INSULIN GLARGINE 4 UNIT(S): 100 INJECTION, SOLUTION SUBCUTANEOUS at 21:08

## 2021-08-31 RX ADMIN — Medication 6 MILLIGRAM(S): at 09:33

## 2021-08-31 RX ADMIN — REMDESIVIR 500 MILLIGRAM(S): 5 INJECTION INTRAVENOUS at 21:09

## 2021-08-31 RX ADMIN — Medication 100 MILLIGRAM(S): at 09:36

## 2021-08-31 RX ADMIN — Medication 4: at 17:14

## 2021-08-31 RX ADMIN — Medication 4: at 12:51

## 2021-08-31 RX ADMIN — AMLODIPINE BESYLATE 5 MILLIGRAM(S): 2.5 TABLET ORAL at 09:33

## 2021-08-31 RX ADMIN — DIPHENHYDRAMINE HYDROCHLORIDE AND LIDOCAINE HYDROCHLORIDE AND ALUMINUM HYDROXIDE AND MAGNESIUM HYDRO 15 MILLILITER(S): KIT at 09:35

## 2021-08-31 NOTE — PROGRESS NOTE ADULT - ASSESSMENT
Pt is a 70 yo male with a pmh/o HTN, HLD, skin CA admitted for:         #Acute hypoxic respiratory Failure  secondary to COVID 19 PNA,  superimposed E.COLI PNA, also suspected underline mass and Mediastinal LAD  - CTA chest: neg for PE, extensive pulm infiltrates, with RLL nodularity and mediastinal mass, possible Lung  malignancy   - Maintain on airborne isolation.  - Continue with O2 via HF nasal cannula,  50/75%     keep O2 sats >92%  - continuous pulse ox  - Acetaminophen 650 mg PO q4h PRN fever  - HFA albuterol Q6 hour PRN   - C/w  Dexamethasone & Remdesivir Day 7  - C/w Ceftriaxone IV   -Mucinex  - F/u sputum Cx  -  trend proinflammatory markers   -will need f/u CT to follow up on infiltrates and possible mass  in 4-6 weeks   -C/w  Dr Oshea      # Hyperglycemia  - HB A1c 6.3, pre diabetes   - BS elevated 2/2 dexamethasone  - Monitor BS, cover with ISS       #Hypokalemia  -repleted   - monitor     #HTN  - monitor BP, stable  - on amlodipine     # Constipation  CT abd neg for obstruction  bowel regiment        #  Coagulase negative Staph Bacteremia.    BCX x 1 set:  + Coagulase neg staph, likely contamination   repeat  Cx: NGTD  C/w IV  Rocephin  for sputum ECOLI coverage   D/w DR Oshea        # Acute LLE below the knee DVT  - as pt is high risk and cant r/o PE ( Pt is on HFNC) c/w  Full dose A/c  - Lovenox 90mg SQ BID      #Skin CA  Pt may use own 5FU cream topically as directed    # Constipation  Bowel regimen   had 2 BMs     # DVT PPX: Lovenox SQ    Dispo: monitor closely,  respiratory status frail, high risk of intubation

## 2021-08-31 NOTE — PROGRESS NOTE ADULT - SUBJECTIVE AND OBJECTIVE BOX
Date of service: 08-31-21 @ 11:56    OOB to chair  Has SOB with light exercise  Has dry cough    ROS: no fever or chills; denies dizziness, no HA, no abdominal pain, no diarrhea or constipation; no dysuria, no legs pain, no rashes    MEDICATIONS  (STANDING):  amLODIPine   Tablet 5 milliGRAM(s) Oral daily  cefTRIAXone Injectable. 2000 milliGRAM(s) IV Push every 24 hours  dexAMETHasone  Injectable 6 milliGRAM(s) IV Push daily  dextrose 40% Gel 15 Gram(s) Oral once  dextrose 5%. 1000 milliLiter(s) (50 mL/Hr) IV Continuous <Continuous>  dextrose 5%. 1000 milliLiter(s) (100 mL/Hr) IV Continuous <Continuous>  dextrose 50% Injectable 25 Gram(s) IV Push once  dextrose 50% Injectable 12.5 Gram(s) IV Push once  dextrose 50% Injectable 25 Gram(s) IV Push once  DULoxetine 60 milliGRAM(s) Oral daily  enoxaparin Injectable 90 milliGRAM(s) SubCutaneous two times a day  glucagon  Injectable 1 milliGRAM(s) IntraMuscular once  insulin lispro (ADMELOG) corrective regimen sliding scale   SubCutaneous three times a day before meals  insulin lispro (ADMELOG) corrective regimen sliding scale   SubCutaneous at bedtime  melatonin 5 milliGRAM(s) Oral at bedtime  polyethylene glycol 3350 17 Gram(s) Oral daily  remdesivir  IVPB 100 milliGRAM(s) IV Intermittent every 24 hours    Vital Signs Last 24 Hrs  T(C): 37.2 (31 Aug 2021 07:46), Max: 37.2 (31 Aug 2021 07:46)  T(F): 98.9 (31 Aug 2021 07:46), Max: 98.9 (31 Aug 2021 07:46)  HR: 73 (31 Aug 2021 07:46) (70 - 87)  BP: 138/71 (31 Aug 2021 07:46) (124/70 - 138/71)  BP(mean): 85 (30 Aug 2021 16:12) (85 - 85)  RR: 18 (31 Aug 2021 07:46) (18 - 18)  SpO2: 92% (31 Aug 2021 11:39) (91% - 99%)     Physical exam:    Constitutional:  No acute distress  HEENT: NC/AT, EOMI, PERRLA, conjunctivae clear  Neck: supple; thyroid not palpable  Back: no tenderness  Respiratory: respiratory effort normal; crackles at bases  Cardiovascular: S1S2 regular, no murmurs  Abdomen: soft, not tender, not distended, positive BS  Genitourinary: no suprapubic tenderness  Lymphatic: no LN palpable  Musculoskeletal: no muscle tenderness, no joint swelling or tenderness  Extremities: no pedal edema  Neurological/ Psychiatric: AxOx3, moving all extremities  Skin: no rashes; no palpable lesions    Labs: reviewed                        13.4   20.79 )-----------( 312      ( 31 Aug 2021 07:43 )             38.9     08-31    133<L>  |  102  |  26<H>  ----------------------------<  138<H>  4.6   |  24  |  0.79    Ca    8.3<L>      31 Aug 2021 07:43    TPro  6.4  /  Alb  2.2<L>  /  TBili  0.6  /  DBili  0.2  /  AST  52<H>  /  ALT  123<H>  /  AlkPhos  176<H>  08-31    D-Dimer Assay, Quantitative: 9326 ng/mL DDU (08-29-21 @ 08:12)  D-Dimer Assay, Quantitative: 5563 ng/mL DDU (08-28-21 @ 08:21)  C-Reactive Protein, Serum: 224 mg/L (08-25-21 @ 20:49)  D-Dimer Assay, Quantitative: 1819 ng/mL DDU (08-25-21 @ 20:49)  Ferritin, Serum: 1675 ng/mL (08-25-21 @ 20:49)                        12.5   22.44 )-----------( 298      ( 27 Aug 2021 08:53 )             35.3     08-27    138  |  106  |  19  ----------------------------<  143<H>  3.9   |  24  |  0.72    Ca    8.4<L>      27 Aug 2021 08:53    TPro  6.1  /  Alb  2.1<L>  /  TBili  0.8  /  DBili  x   /  AST  57<H>  /  ALT  78  /  AlkPhos  133<H>  08-26        C-Reactive Protein, Serum: 224 mg/L (08-25-21 @ 20:49)  D-Dimer Assay, Quantitative: 1819 ng/mL DDU (08-25-21 @ 20:49)  Ferritin, Serum: 1675 ng/mL (08-25-21 @ 20:49)                        12.6   8.64  )-----------( 230      ( 26 Aug 2021 08:43 )             36.2     08-26    Culture - Sputum (collected 27 Aug 2021 05:21)  Source: .Sputum Sputum  Gram Stain (28 Aug 2021 13:37):    Few polymorphonuclear leukocytes per low power field    Few Squamous epithelial cells per low power field    Moderate Gram Positive Cocci in Clusters per oil power field    Few Gram Negative Rods per oil power field    Few Gram Positive Rods per oil power field    Few Gram Negative Diplococci per oil power field  Preliminary Report (30 Aug 2021 09:39):    Moderate Escherichia coli    Normal Respiratory Celine present  Organism: Escherichia coli (30 Aug 2021 09:38)  Organism: Escherichia coli (30 Aug 2021 09:38)      -  Amikacin: S <=16      -  Amoxicillin/Clavulanic Acid: S <=8/4      -  Ampicillin: S <=8 These ampicillin results predict results for amoxicillin      -  Ampicillin/Sulbactam: S <=4/2 Enterobacter, Citrobacter, and Serratia may develop resistance during prolonged therapy (3-4 days)      -  Aztreonam: S <=4      -  Cefazolin: S <=2 Enterobacter, Citrobacter, and Serratia may develop resistance during prolonged therapy (3-4 days)      -  Cefepime: S <=2      -  Cefoxitin: S <=8      -  Ceftriaxone: S <=1 Enterobacter, Citrobacter, and Serratia may develop resistance during prolonged therapy      -  Ciprofloxacin: S <=0.25      -  Ertapenem: S <=0.5      -  Gentamicin: S <=2      -  Imipenem: S <=1      -  Levofloxacin: S <=0.5      -  Meropenem: S <=1      -  Piperacillin/Tazobactam: S <=8      -  Tobramycin: S <=2      -  Trimethoprim/Sulfamethoxazole: S <=0.5/9.5      Method Type: CRISTINA    Culture - Blood (collected 25 Aug 2021 20:49)  Source: .Blood None  Gram Stain (26 Aug 2021 19:21):    Growth in aerobic bottle: Gram Positive Cocci in Clusters    Growth in anaerobic bottle: Gram Positive Cocci in Clusters  Final Report (27 Aug 2021 17:40):    Growth in aerobic and anaerobic bottles: Staphylococcus hominis    Coag Negative Staphylococcus    Single set isolate, possible contaminant. Contact    Microbiology if susceptibility testing clinically    indicated.  Organism: Blood Culture PCR (27 Aug 2021 17:40)      -  Coagulase negative Staphylococcus: Detec      Method Type: PCR      137  |  108  |  19  ----------------------------<  269<H>  3.6   |  22  |  0.89    Ca    8.3<L>      26 Aug 2021 08:43    TPro  6.1  /  Alb  2.1<L>  /  TBili  0.8  /  DBili  x   /  AST  57<H>  /  ALT  78  /  AlkPhos  133<H>  08-26     LIVER FUNCTIONS - ( 26 Aug 2021 08:43 )  Alb: 2.1 g/dL / Pro: 6.1 gm/dL / ALK PHOS: 133 U/L / ALT: 78 U/L / AST: 57 U/L / GGT: x           COVID (08-25 @ 20:49)  Detected        Radiology: all available radiological tests reviewed    < from: CT Angio Chest PE Protocol w/ IV Cont (08.25.21 @ 22:53) >  No pulmonary embolism.  Severe patchy and partially confluent groundglass opacity in both lungs with patchy areas of more dense airspace disease is compatible with with COVID-19 pneumonia.  Small pleural effusions bilaterally.  A 2.5 x 2 cm right lower lobe nodular opacity appears more nodular and solid.  Numerous mediastinal and hilar lymph nodes measure up to 1.5 cm short axis in subcarina region and 1.4 cm short axis in the left hilum.  There is confluent soft tissue in the right hilum, surrounding the right upper lobe bronchus without associated airway narrowing.Underlying malignancy/lung cancer is not excluded.  Repeat chest CT scan is recommended in approximately one month after therapy and resolution of COVID-19 pneumonia two reassess the lung findings.  < end of copied text >    < from: US Duplex Venous Lower Ext Complete, Bilateral (08.29.21 @ 08:51) >  Below-the-knee left deep veinthrombosis involving the gastrocnemius and posterior tibial veins.  < end of copied text >      Advanced directives addressed: full resuscitation

## 2021-08-31 NOTE — PROGRESS NOTE ADULT - SUBJECTIVE AND OBJECTIVE BOX
CC: COVID     HPI:  Pt is a 70 yo male with a pmh/o HTN, HLD, skin CA who is a smoker and who quit smoking two weeks ago due to malaise, who presents to ED today due to worsening shortness of breath, cough, body aches and pains, fever, chills. Pt states symptoms have gotten significantly worse over past two days. Pt is not COVID vaccinated. Arrived with oxygen saturation at 86% on RA. Improved to mid 90's on 4L NC. Denies cp, palpitations, n/v/d, abd pain, constipation, rash, leg swelling, calf pain, dysuria, hematuria, sputum production, hemoptysis, HA.      INTERVAL HPI/ OVERNIGHT EVENTS: Pt was seen and examined,   OOB to chair, having breakfast reports feels better. BMs       Vital Signs Last 24 Hrs  T(C): 37.2 (31 Aug 2021 07:46), Max: 37.2 (31 Aug 2021 07:46)  T(F): 98.9 (31 Aug 2021 07:46), Max: 98.9 (31 Aug 2021 07:46)  HR: 73 (31 Aug 2021 07:46) (70 - 87)  BP: 138/71 (31 Aug 2021 07:46) (134/74 - 138/71)  RR: 18 (31 Aug 2021 07:46) (18 - 18)  SpO2: 91% (31 Aug 2021 17:38) (91% - 96%)    REVIEW OF SYSTEMS:  All other review of systems is negative unless indicated above.      PHYSICAL EXAM:  General: Well developed: looks acutely ill,  NAD on HFNC  Eyes: EOMI; conjunctiva and sclera clear  Head: Normocephalic; atraumatic  ENMT: No nasal discharge; airway clear  Neck: Supple; non tender; no masses  Respiratory: Better air entry b.l, but still BS decreased,  No wheezes, rales or rhonchi  Cardiovascular: Regular rate and rhythm. S1 and S2 Normal;   Gastrointestinal: Soft , mildly distended, mildly tender at L lower abd, + bowel sounds  Genitourinary: No  suprapubic  tenderness  Extremities: No LE  edema  Vascular: Peripheral pulses palpable 2+ bilaterally  Neurological: Alert and oriented x3, non focal   Musculoskeletal: Normal muscle tone and strength   Psychiatric: Cooperative        LABS:                         13.4   20.79 )-----------( 312      ( 31 Aug 2021 07:43 )             38.9     08-31    133<L>  |  102  |  26<H>  ----------------------------<  138<H>  4.6   |  24  |  0.79    Ca    8.3<L>      31 Aug 2021 07:43    TPro  6.4  /  Alb  2.2<L>  /  TBili  0.6  /  DBili  0.2  /  AST  52<H>  /  ALT  123<H>  /  AlkPhos  176<H>  08-31    LIVER FUNCTIONS - ( 31 Aug 2021 07:43 )  Alb: 2.2 g/dL / Pro: 6.4 gm/dL / ALK PHOS: 176 U/L / ALT: 123 U/L / AST: 52 U/L / GGT: x           PT/INR - ( 31 Aug 2021 07:43 )   PT: 13.5 sec;   INR: 1.16 ratio                            14.1   19.03 )-----------( 266      ( 30 Aug 2021 08:55 )             40.6     08-30    x   |  x   |  x   ----------------------------<  x   x    |  x   |  0.94    Ca    8.1<L>      30 Aug 2021 08:55    TPro  6.9  /  Alb  2.4<L>  /  TBili  0.6  /  DBili  0.2  /  AST  101<H>  /  ALT  145<H>  /  AlkPhos  202<H>  08-30  LIVER FUNCTIONS - ( 30 Aug 2021 12:24 )  Alb: 2.4 g/dL / Pro: 6.9 gm/dL / ALK PHOS: 202 U/L / ALT: 145 U/L / AST: 101 U/L / GGT: x           PT/INR - ( 30 Aug 2021 12:24 )   PT: 14.1 sec;   INR: 1.23 ratio                                13.9   18.34 )-----------( 210      ( 29 Aug 2021 08:12 )             39.2     08-29    135  |  103  |  24<H>  ----------------------------<  127<H>  4.1   |  26  |  0.75    Ca    8.2<L>      29 Aug 2021 08:12                          13.1   17.99 )-----------( 225      ( 28 Aug 2021 08:21 )             36.9     08-28    133<L>  |  102  |  21  ----------------------------<  124<H>  3.9   |  24  |  0.78    Ca    7.7<L>      28 Aug 2021 08:21    TPro  5.9<L>  /  Alb  2.4<L>  /  TBili  0.8  /  DBili  x   /  AST  91<H>  /  ALT  139<H>  /  AlkPhos  167<H>  08-2  LIVER FUNCTIONS - ( 28 Aug 2021 08:21 )  Alb: 2.4 g/dL / Pro: 5.9 gm/dL / ALK PHOS: 167 U/L / ALT: 139 U/L / AST: 91 U/L / GGT: x           PT/INR - ( 28 Aug 2021 08:21 )   PT: 14.1 sec;   INR: 1.22 ratio      PTT - ( 28 Aug 2021 08:21 )  PTT:25.1 sec      CARDIAC MARKERS ( 25 Aug 2021 20:49 )  <0.015 ng/mL / x     / 78 U/L / x     / x                                12.6   8.64  )-----------( 230      ( 26 Aug 2021 08:43 )             36.2     26 Aug 2021 08:43    137    |  108    |  19     ----------------------------<  269    3.6     |  22     |  0.89     Ca    8.3        26 Aug 2021 08:43    TPro  6.1    /  Alb  2.1    /  TBili  0.8    /  DBili  x      /  AST  57     /  ALT  78     /  AlkPhos  133    26 Aug 2021 08:43    PT/INR - ( 25 Aug 2021 20:49 )   PT: 14.0 sec;   INR: 1.22 ratio    PTT - ( 25 Aug 2021 20:49 )  PTT:25.0 sec      LIVER FUNCTIONS - ( 26 Aug 2021 08:43 )  Alb: 2.1 g/dL / Pro: 6.1 gm/dL / ALK PHOS: 133 U/L / ALT: 78 U/L / AST: 57 U/L / GGT: x               Culture - Blood (08.25.21 @ 20:49)   - Coagulase negative Staphylococcus: Detec   Gram Stain:   Growth in aerobic bottle: Gram Positive Cocci in Clusters   Growth in anaerobic bottle: Gram Positive Cocci in Clusters   Specimen Source: .Blood None   Organism: Blood Culture PCR   Culture Results:   Growth in aerobic and anaerobic bottles: Staphylococcus hominis   Coag Negative Staphylococcus   Single set isolate, possible contaminant.     Culture - Sputum . (08.27.21 @ 05:21)   Gram Stain:   Few polymorphonuclear leukocytes per low power field   Few Squamous epithelial cells per low power field   Moderate Gram Positive Cocci in Clusters per oil power field   Few Gram Negative Rods per oil power field   Few Gram Positive Rods per oil power field   Few Gram Negative Diplococci per oil power field   - Amikacin: S <=16   - Amoxicillin/Clavulanic Acid: S <=8/4   - Ampicillin: S <=8 These ampicillin results predict results for amoxicillin   - Ampicillin/Sulbactam: S <=4/2 Enterobacter, Citrobacter, and Serratia may develop resistance during prolonged therapy (3-4 days)   - Aztreonam: S <=4   - Cefazolin: S <=2 Enterobacter, Citrobacter, and Serratia may develop resistance during prolonged therapy (3-4 days)   - Cefepime: S <=2   - Cefoxitin: S <=8   - Ceftriaxone: S <=1 Enterobacter, Citrobacter, and Serratia may develop resistance during prolonged therapy   - Ciprofloxacin: S <=0.25   - Ertapenem: S <=0.5   - Gentamicin: S <=2   - Imipenem: S <=1   - Levofloxacin: S <=0.5   - Meropenem: S <=1   - Piperacillin/Tazobactam: S <=8   - Tobramycin: S <=2   - Trimethoprim/Sulfamethoxazole: S <=0.5/9.5   Specimen Source: .Sputum Sputum   Culture Results:   Moderate Escherichia coli   Normal Respiratory Celine present   Organism Identification: Escherichia coli   Organism: Escherichia coli   Method Type: CRISTINA     MEDICATIONS  (STANDING):  amLODIPine   Tablet 5 milliGRAM(s) Oral daily  cefTRIAXone Injectable. 2000 milliGRAM(s) IV Push every 24 hours  dexAMETHasone  Injectable 6 milliGRAM(s) IV Push daily  dextrose 40% Gel 15 Gram(s) Oral once  dextrose 5%. 1000 milliLiter(s) (50 mL/Hr) IV Continuous <Continuous>  dextrose 5%. 1000 milliLiter(s) (100 mL/Hr) IV Continuous <Continuous>  dextrose 50% Injectable 25 Gram(s) IV Push once  dextrose 50% Injectable 12.5 Gram(s) IV Push once  dextrose 50% Injectable 25 Gram(s) IV Push once  DULoxetine 60 milliGRAM(s) Oral daily  enoxaparin Injectable 90 milliGRAM(s) SubCutaneous two times a day  glucagon  Injectable 1 milliGRAM(s) IntraMuscular once  insulin lispro (ADMELOG) corrective regimen sliding scale   SubCutaneous three times a day before meals  insulin lispro (ADMELOG) corrective regimen sliding scale   SubCutaneous at bedtime  melatonin 5 milliGRAM(s) Oral at bedtime  polyethylene glycol 3350 17 Gram(s) Oral daily  remdesivir  IVPB 100 milliGRAM(s) IV Intermittent every 24 hours    MEDICATIONS  (PRN):  acetaminophen   Tablet .. 650 milliGRAM(s) Oral every 4 hours PRN Temp greater or equal to 38C (100.4F), Mild Pain (1 - 3)  ALBUTerol    90 MICROgram(s) HFA Inhaler 2 Puff(s) Inhalation every 4 hours PRN Shortness of Breath and/or Wheezing  benzonatate 100 milliGRAM(s) Oral three times a day PRN Cough  FIRST- Mouthwash  BLM 15 milliLiter(s) Swish and Spit four times a day PRN Mouth Care  guaifenesin/dextromethorphan Oral Liquid 10 milliLiter(s) Oral every 4 hours PRN Cough  ondansetron Injectable 4 milliGRAM(s) IV Push every 8 hours PRN Nausea and/or Vomiting  senna 2 Tablet(s) Oral at bedtime PRN Constipation  simethicone 80 milliGRAM(s) Chew four times a day PRN Gas  zolpidem 5 milliGRAM(s) Oral at bedtime PRN Insomnia  zolpidem 5 milliGRAM(s) Oral at bedtime PRN Insomnia      RADIOLOGY & ADDITIONAL TESTS:    EXAM:  CT ANGIO CHEST PULM [facility]                        PROCEDURE DATE:  08/25/2021        INTERPRETATION:  CLINICAL INFORMATION: COVID positive.  Positive d-dimer.    COMPARISON: 11/16/2012    CONTRAST/COMPLICATIONS:  IV Contrast: Omnipaque 350  90 cc administered   10 cc discarded  Oral Contrast: NONE  Complications: None reported at time of study completion    PROCEDURE:  CT Angiography of the Chest.  Sagittal and coronal reformats were performed as well as 3D (MIP) reconstructions.    FINDINGS:    LUNGS AND AIRWAYS: Patent central airways.  There is severe patchy and partially confluent groundglass opacity in both lungs with patchy areas of more dense airspace disease.  A 2.5 x 2 cm right lower lobe nodular opacity appears more nodular and solid  PLEURA: Small pleural effusions bilaterally.  MEDIASTINUM AND DONAVON: There are numerous mediastinal and hilar lymph nodes measuring up to approximately 1.5 cm short access in the subcarinal region (2:58) and 1.4 cm short access in the left hilum (2:51).  There is confluent soft tissue in the right hilum, surrounding the right upper lobe bronchus (2:45; there is no narrowing of the airway.  VESSELS: No pulmonary embolism.  Mild atherosclerosis of the visualized aorta and branch vessels.  HEART: The heart appears mildly enlarged.  Mild to moderate atherosclerotic calcification of the coronary arteries. No pericardial effusion.  CHEST WALL AND LOWER NECK: Within normal limits.  VISUALIZED UPPER ABDOMEN: Small hiatal hernia.  Enlarged spleen measures up to 15.3 cm in the axial plane (2:114.  Multiple left upper pole renal cysts are noted.  BONES: Degenerative changes in the spine.    IMPRESSION:  No pulmonary embolism.    Severe patchy and partially confluent groundglass opacity in both lungs with patchy areas of more dense airspace disease is compatible with with COVID-19 pneumonia.    Small pleural effusions bilaterally.    A 2.5 x 2 cm right lower lobe nodular opacity appears more nodular and solid.  Numerous mediastinal and hilar lymph nodes measure up to 1.5 cm short axis in subcarina region and 1.4 cm short axis in the left hilum.  There is confluent soft tissue in the right hilum, surrounding the right upper lobe bronchus without associated airway narrowing.Underlying malignancy/lung cancer is not excluded.  Repeat chest CT scan is recommended in approximately one month after therapy and resolution of COVID-19 pneumonia two reassess the lung findings.

## 2021-08-31 NOTE — PROGRESS NOTE ADULT - ASSESSMENT
72 y/o male with h/o HTN, HLD, skin CA was admitted on 8/25 for worsening shortness of breath, cough, body aches and pains, fever, chills. Pt states symptoms have gotten significantly worse over past two days PTA. In ER, he arrived with oxygen saturation at 86% on RA. Improved to mid 90's on 4L NC. Denies fever or chills at home.     1. COVID-19 viral syndrome. Multifocal pneumonia. Possible superimposed bacterial pneumonia with E.coli. Bacteremia with CNST ?contaminant. Acute respiratory failure. Left leg DVT. Allergy to PCN.   -febrile syndrome  -respiratory frail  -on remdesivir protocol # 6  -tolerating abx well so far; no side effects noted  -repeat BC x 2  -on ceftriaxone 2 gm IV qd # 3  -monitor closely in [place] of PCN allergy history  -O2 therapy  -steroids  -on full AC  -droplet isolation  -respiratory care  -concern for pulmonary malignancy reported by radiologist; will need f/u as outpatient  -continue antiviral and abx therapy  -monitor temps  -f/u CBC  -supportive care  2. Other issues:   -care per medicine

## 2021-09-01 LAB
ANION GAP SERPL CALC-SCNC: 5 MMOL/L — SIGNIFICANT CHANGE UP (ref 5–17)
BUN SERPL-MCNC: 29 MG/DL — HIGH (ref 7–23)
CALCIUM SERPL-MCNC: 8.4 MG/DL — LOW (ref 8.5–10.1)
CHLORIDE SERPL-SCNC: 104 MMOL/L — SIGNIFICANT CHANGE UP (ref 96–108)
CO2 SERPL-SCNC: 25 MMOL/L — SIGNIFICANT CHANGE UP (ref 22–31)
CREAT SERPL-MCNC: 0.83 MG/DL — SIGNIFICANT CHANGE UP (ref 0.5–1.3)
CRP SERPL-MCNC: 70 MG/L — HIGH
D DIMER BLD IA.RAPID-MCNC: 1927 NG/ML DDU — HIGH
FERRITIN SERPL-MCNC: 2309 NG/ML — HIGH (ref 30–400)
GLUCOSE SERPL-MCNC: 133 MG/DL — HIGH (ref 70–99)
HCT VFR BLD CALC: 38.8 % — LOW (ref 39–50)
HGB BLD-MCNC: 13.6 G/DL — SIGNIFICANT CHANGE UP (ref 13–17)
INR BLD: 1.08 RATIO — SIGNIFICANT CHANGE UP (ref 0.88–1.16)
MCHC RBC-ENTMCNC: 32.7 PG — SIGNIFICANT CHANGE UP (ref 27–34)
MCHC RBC-ENTMCNC: 35.1 GM/DL — SIGNIFICANT CHANGE UP (ref 32–36)
MCV RBC AUTO: 93.3 FL — SIGNIFICANT CHANGE UP (ref 80–100)
PLATELET # BLD AUTO: 346 K/UL — SIGNIFICANT CHANGE UP (ref 150–400)
POTASSIUM SERPL-MCNC: 4.6 MMOL/L — SIGNIFICANT CHANGE UP (ref 3.5–5.3)
POTASSIUM SERPL-SCNC: 4.6 MMOL/L — SIGNIFICANT CHANGE UP (ref 3.5–5.3)
PROTHROM AB SERPL-ACNC: 12.5 SEC — SIGNIFICANT CHANGE UP (ref 10.6–13.6)
RBC # BLD: 4.16 M/UL — LOW (ref 4.2–5.8)
RBC # FLD: 13.1 % — SIGNIFICANT CHANGE UP (ref 10.3–14.5)
SODIUM SERPL-SCNC: 134 MMOL/L — LOW (ref 135–145)
WBC # BLD: 20.99 K/UL — HIGH (ref 3.8–10.5)
WBC # FLD AUTO: 20.99 K/UL — HIGH (ref 3.8–10.5)

## 2021-09-01 PROCEDURE — 99233 SBSQ HOSP IP/OBS HIGH 50: CPT

## 2021-09-01 PROCEDURE — 99291 CRITICAL CARE FIRST HOUR: CPT

## 2021-09-01 RX ADMIN — Medication 100 MILLIGRAM(S): at 09:26

## 2021-09-01 RX ADMIN — ENOXAPARIN SODIUM 90 MILLIGRAM(S): 100 INJECTION SUBCUTANEOUS at 22:28

## 2021-09-01 RX ADMIN — AMLODIPINE BESYLATE 5 MILLIGRAM(S): 2.5 TABLET ORAL at 09:24

## 2021-09-01 RX ADMIN — Medication 3 UNIT(S): at 16:22

## 2021-09-01 RX ADMIN — Medication 6 MILLIGRAM(S): at 09:23

## 2021-09-01 RX ADMIN — Medication 2: at 16:21

## 2021-09-01 RX ADMIN — REMDESIVIR 500 MILLIGRAM(S): 5 INJECTION INTRAVENOUS at 22:29

## 2021-09-01 RX ADMIN — CEFTRIAXONE 2000 MILLIGRAM(S): 500 INJECTION, POWDER, FOR SOLUTION INTRAMUSCULAR; INTRAVENOUS at 09:26

## 2021-09-01 RX ADMIN — DULOXETINE HYDROCHLORIDE 60 MILLIGRAM(S): 30 CAPSULE, DELAYED RELEASE ORAL at 09:24

## 2021-09-01 RX ADMIN — Medication 3 UNIT(S): at 12:42

## 2021-09-01 RX ADMIN — INSULIN GLARGINE 4 UNIT(S): 100 INJECTION, SOLUTION SUBCUTANEOUS at 22:28

## 2021-09-01 RX ADMIN — ZOLPIDEM TARTRATE 5 MILLIGRAM(S): 10 TABLET ORAL at 22:28

## 2021-09-01 RX ADMIN — ENOXAPARIN SODIUM 90 MILLIGRAM(S): 100 INJECTION SUBCUTANEOUS at 09:24

## 2021-09-01 RX ADMIN — Medication 3 UNIT(S): at 09:05

## 2021-09-01 RX ADMIN — Medication 5 MILLIGRAM(S): at 22:28

## 2021-09-01 NOTE — PROGRESS NOTE ADULT - NSPROGADDITIONALINFOA_GEN_ALL_CORE
In total, 32 minutes, exclusive of procedures, was spent caring for this patient while they were in critical condition.

## 2021-09-01 NOTE — PROGRESS NOTE ADULT - SUBJECTIVE AND OBJECTIVE BOX
SUBJECTIVE:  Seen and examined at bedside, doing well.   Oxgyenation has improved 50L, 50% on high flow.   At the time of my exam, sitting upright in bed.   Breathing improved.   He is using incentive spirometery.   He is unable to tolerate prone postioning     ROS otherwise negative.     PHYSICAL EXAMINATION:  GENERAL APPEARANCE:  No distress. Slight increased resp.effort   HEENT:  Ulcers in mouth. Mucus membranes moist.   NECK:  Supple. No JVD.   CHEST:  Normal chest excursion. No audible wheezing.   ABDOMEN:  Soft, non-distended.   EXTREMITIES:  There is no cyanosis, clubbing or edema.   SKIN:  No rash or significant lesions are noted. No jaundice.  PSYCH: Normal affect.  NEURO: Alert and oriented. Responds to question appropriate. No focal deficits appreciated.       Vital Signs Last 24 Hrs  T(C): 37.1 (01 Sep 2021 08:05), Max: 37.2 (01 Sep 2021 00:07)  T(F): 98.8 (01 Sep 2021 08:05), Max: 99 (01 Sep 2021 00:07)  HR: 71 (01 Sep 2021 08:05) (71 - 83)  BP: 127/64 (01 Sep 2021 08:05) (127/64 - 132/77)  BP(mean): --  RR: --  SpO2: 91% (01 Sep 2021 08:40) (91% - 94%)    MEDICATIONS  (STANDING):  amLODIPine   Tablet 5 milliGRAM(s) Oral daily  cefTRIAXone Injectable. 2000 milliGRAM(s) IV Push every 24 hours  dexAMETHasone  Injectable 6 milliGRAM(s) IV Push daily  dextrose 40% Gel 15 Gram(s) Oral once  dextrose 5%. 1000 milliLiter(s) (100 mL/Hr) IV Continuous <Continuous>  dextrose 5%. 1000 milliLiter(s) (50 mL/Hr) IV Continuous <Continuous>  dextrose 50% Injectable 25 Gram(s) IV Push once  dextrose 50% Injectable 12.5 Gram(s) IV Push once  dextrose 50% Injectable 25 Gram(s) IV Push once  DULoxetine 60 milliGRAM(s) Oral daily  enoxaparin Injectable 90 milliGRAM(s) SubCutaneous two times a day  glucagon  Injectable 1 milliGRAM(s) IntraMuscular once  insulin glargine Injectable (LANTUS) 4 Unit(s) SubCutaneous at bedtime  insulin lispro (ADMELOG) corrective regimen sliding scale   SubCutaneous three times a day before meals  insulin lispro (ADMELOG) corrective regimen sliding scale   SubCutaneous at bedtime  insulin lispro Injectable (ADMELOG) 3 Unit(s) SubCutaneous three times a day before meals  melatonin 5 milliGRAM(s) Oral at bedtime  polyethylene glycol 3350 17 Gram(s) Oral daily  remdesivir  IVPB 100 milliGRAM(s) IV Intermittent every 24 hours      LABS:                        13.6   20.99 )-----------( 346      ( 01 Sep 2021 08:04 )             38.8     09-01    134<L>  |  104  |  29<H>  ----------------------------<  133<H>  4.6   |  25  |  0.83      RADIOLOGY & ADDITIONAL STUDIES:   *images personally reviewed.   CT Chest 8/25/21 -   IMPRESSION:  No pulmonary embolism.  Severe patchy and partially confluent groundglass opacity in both lungs with patchy areas of more dense airspace disease is compatible with with COVID-19 pneumonia.  Small pleural effusions bilaterally.  A 2.5 x 2 cm right lower lobe nodular opacity appears more nodular and solid. Numerous mediastinal and hilar lymph nodes measure up to 1.5 cm short axis in subcarina region and 1.4 cm short axis in the left hilum. There is confluent soft tissue in the right hilum, surrounding the right upper lobe bronchus without associated airway narrowing. Underlying malignancy/lung cancer is not excluded. Repeat chest CT scan is recommended in approximately one month after therapy and resolution of COVID-19 pneumonia two reassess the lung findings.    LE Doppler  IMPRESSION:  Below-the-knee left deep vein thrombosis involving the gastrocnemius and posterior tibial veins.

## 2021-09-01 NOTE — PROGRESS NOTE ADULT - ASSESSMENT
Pt is a 70 yo male with a pmh/o HTN, HLD, skin CA admitted for:       #Acute hypoxic respiratory Failure  secondary to COVID 19 PNA,  superimposed E.COLI PNA, also suspected underline mass and Mediastinal LAD  - CTA chest: neg for PE, extensive pulm infiltrates, with RLL nodularity and mediastinal mass, possible Lung  malignancy   - Maintain on airborne isolation.  - Continue with O2 via HF nasal cannula, 50LPM/45%,      keep O2 sats >92%  - continuous pulse ox  - Acetaminophen 650 mg PO q4h PRN fever  - HFA albuterol Q6 hour PRN   - C/w  Dexamethasone & Remdesivir Day 7  - C/w Ceftriaxone IV day 4  -Mucinex  -F/u sputum Cx  - trend proinflammatory markers   -will need f/u CT to follow up on infiltrates and possible mass  in 4-6 weeks   -C/w  Dr Oshea      # Hyperglycemia  - HB A1c 6.3, pre diabetes   - BS elevated 2/2 dexamethasone  - Monitor BS, cover with ISS, on low dose Lantus while on steroids       #Hypokalemia  -repleted   - monitor     #HTN  - monitor BP, stable  - on amlodipine     # Constipation  CT abd neg for obstruction  bowel regiment        #  Coagulase negative Staph Bacteremia.    BCX x 1 set:  + Coagulase neg staph, likely contamination   repeat  Cx: NGTD  C/w IV  Rocephin  for sputum ECOLI coverage   D/w DR Oshea        # Acute LLE below the knee DVT  - as pt is high risk and cant r/o PE ( Pt is on HFNC) c/w  Full dose A/c  - Lovenox 90mg SQ BID      #Skin CA  Pt may use own 5FU cream topically as directed    # Constipation  Bowel regimen   had 2 BMs     # DVT PPX: Lovenox SQ    Dispo: monitor closely,  Titrate HFNC as tolerated

## 2021-09-01 NOTE — PROGRESS NOTE ADULT - SUBJECTIVE AND OBJECTIVE BOX
Date of service: 09-01-21 @ 11:25    Lying in bed in NAD  Hypoxic with light exercise  On high flow O2  Has dry cough  Has low grade fever    ROS: denies dizziness, no HA, no abdominal pain, no diarrhea or constipation; no dysuria, no legs pain, no rashes    MEDICATIONS  (STANDING):  amLODIPine   Tablet 5 milliGRAM(s) Oral daily  cefTRIAXone Injectable. 2000 milliGRAM(s) IV Push every 24 hours  dexAMETHasone  Injectable 6 milliGRAM(s) IV Push daily  dextrose 40% Gel 15 Gram(s) Oral once  dextrose 5%. 1000 milliLiter(s) (100 mL/Hr) IV Continuous <Continuous>  dextrose 5%. 1000 milliLiter(s) (50 mL/Hr) IV Continuous <Continuous>  dextrose 50% Injectable 25 Gram(s) IV Push once  dextrose 50% Injectable 12.5 Gram(s) IV Push once  dextrose 50% Injectable 25 Gram(s) IV Push once  DULoxetine 60 milliGRAM(s) Oral daily  enoxaparin Injectable 90 milliGRAM(s) SubCutaneous two times a day  glucagon  Injectable 1 milliGRAM(s) IntraMuscular once  insulin glargine Injectable (LANTUS) 4 Unit(s) SubCutaneous at bedtime  insulin lispro (ADMELOG) corrective regimen sliding scale   SubCutaneous three times a day before meals  insulin lispro (ADMELOG) corrective regimen sliding scale   SubCutaneous at bedtime  insulin lispro Injectable (ADMELOG) 3 Unit(s) SubCutaneous three times a day before meals  melatonin 5 milliGRAM(s) Oral at bedtime  polyethylene glycol 3350 17 Gram(s) Oral daily  remdesivir  IVPB 100 milliGRAM(s) IV Intermittent every 24 hours    Vital Signs Last 24 Hrs  T(C): 37.1 (01 Sep 2021 08:05), Max: 37.2 (01 Sep 2021 00:07)  T(F): 98.8 (01 Sep 2021 08:05), Max: 99 (01 Sep 2021 00:07)  HR: 71 (01 Sep 2021 08:05) (71 - 83)  BP: 127/64 (01 Sep 2021 08:05) (127/64 - 132/77)  BP(mean): --  RR: --  SpO2: 91% (01 Sep 2021 08:40) (91% - 94%)     Physical exam:    Constitutional:  No acute distress  HEENT: NC/AT, EOMI, PERRLA, conjunctivae clear  Neck: supple; thyroid not palpable  Back: no tenderness  Respiratory: respiratory effort normal; crackles at bases  Cardiovascular: S1S2 regular, no murmurs  Abdomen: soft, not tender, not distended, positive BS  Genitourinary: no suprapubic tenderness  Lymphatic: no LN palpable  Musculoskeletal: no muscle tenderness, no joint swelling or tenderness  Extremities: no pedal edema  Neurological/ Psychiatric: AxOx3, moving all extremities  Skin: no rashes; no palpable lesions    Labs: reviewed                        13.4   20.79 )-----------( 312      ( 31 Aug 2021 07:43 )             38.9     08-31    133<L>  |  102  |  26<H>  ----------------------------<  138<H>  4.6   |  24  |  0.79    Ca    8.3<L>      31 Aug 2021 07:43    TPro  6.4  /  Alb  2.2<L>  /  TBili  0.6  /  DBili  0.2  /  AST  52<H>  /  ALT  123<H>  /  AlkPhos  176<H>  08-31    D-Dimer Assay, Quantitative: 9326 ng/mL DDU (08-29-21 @ 08:12)  D-Dimer Assay, Quantitative: 5563 ng/mL DDU (08-28-21 @ 08:21)  C-Reactive Protein, Serum: 224 mg/L (08-25-21 @ 20:49)  D-Dimer Assay, Quantitative: 1819 ng/mL DDU (08-25-21 @ 20:49)  Ferritin, Serum: 1675 ng/mL (08-25-21 @ 20:49)                        12.5   22.44 )-----------( 298      ( 27 Aug 2021 08:53 )             35.3     08-27    138  |  106  |  19  ----------------------------<  143<H>  3.9   |  24  |  0.72    Ca    8.4<L>      27 Aug 2021 08:53    TPro  6.1  /  Alb  2.1<L>  /  TBili  0.8  /  DBili  x   /  AST  57<H>  /  ALT  78  /  AlkPhos  133<H>  08-26        C-Reactive Protein, Serum: 224 mg/L (08-25-21 @ 20:49)  D-Dimer Assay, Quantitative: 1819 ng/mL DDU (08-25-21 @ 20:49)  Ferritin, Serum: 1675 ng/mL (08-25-21 @ 20:49)                        12.6   8.64  )-----------( 230      ( 26 Aug 2021 08:43 )             36.2     08-26    Culture - Sputum (collected 27 Aug 2021 05:21)  Source: .Sputum Sputum  Gram Stain (28 Aug 2021 13:37):    Few polymorphonuclear leukocytes per low power field    Few Squamous epithelial cells per low power field    Moderate Gram Positive Cocci in Clusters per oil power field    Few Gram Negative Rods per oil power field    Few Gram Positive Rods per oil power field    Few Gram Negative Diplococci per oil power field  Preliminary Report (30 Aug 2021 09:39):    Moderate Escherichia coli    Normal Respiratory Celine present  Organism: Escherichia coli (30 Aug 2021 09:38)  Organism: Escherichia coli (30 Aug 2021 09:38)      -  Amikacin: S <=16      -  Amoxicillin/Clavulanic Acid: S <=8/4      -  Ampicillin: S <=8 These ampicillin results predict results for amoxicillin      -  Ampicillin/Sulbactam: S <=4/2 Enterobacter, Citrobacter, and Serratia may develop resistance during prolonged therapy (3-4 days)      -  Aztreonam: S <=4      -  Cefazolin: S <=2 Enterobacter, Citrobacter, and Serratia may develop resistance during prolonged therapy (3-4 days)      -  Cefepime: S <=2      -  Cefoxitin: S <=8      -  Ceftriaxone: S <=1 Enterobacter, Citrobacter, and Serratia may develop resistance during prolonged therapy      -  Ciprofloxacin: S <=0.25      -  Ertapenem: S <=0.5      -  Gentamicin: S <=2      -  Imipenem: S <=1      -  Levofloxacin: S <=0.5      -  Meropenem: S <=1      -  Piperacillin/Tazobactam: S <=8      -  Tobramycin: S <=2      -  Trimethoprim/Sulfamethoxazole: S <=0.5/9.5      Method Type: CRISTINA    Culture - Blood (collected 25 Aug 2021 20:49)  Source: .Blood None  Gram Stain (26 Aug 2021 19:21):    Growth in aerobic bottle: Gram Positive Cocci in Clusters    Growth in anaerobic bottle: Gram Positive Cocci in Clusters  Final Report (27 Aug 2021 17:40):    Growth in aerobic and anaerobic bottles: Staphylococcus hominis    Coag Negative Staphylococcus    Single set isolate, possible contaminant. Contact    Microbiology if susceptibility testing clinically    indicated.  Organism: Blood Culture PCR (27 Aug 2021 17:40)      -  Coagulase negative Staphylococcus: Detec      Method Type: PCR    BC: No growth to date. (08.29.21 @ 14:13)     137  |  108  |  19  ----------------------------<  269<H>  3.6   |  22  |  0.89    Ca    8.3<L>      26 Aug 2021 08:43    TPro  6.1  /  Alb  2.1<L>  /  TBili  0.8  /  DBili  x   /  AST  57<H>  /  ALT  78  /  AlkPhos  133<H>  08-26     LIVER FUNCTIONS - ( 26 Aug 2021 08:43 )  Alb: 2.1 g/dL / Pro: 6.1 gm/dL / ALK PHOS: 133 U/L / ALT: 78 U/L / AST: 57 U/L / GGT: x           COVID (08-25 @ 20:49)  Detected        Radiology: all available radiological tests reviewed    < from: CT Angio Chest PE Protocol w/ IV Cont (08.25.21 @ 22:53) >  No pulmonary embolism.  Severe patchy and partially confluent groundglass opacity in both lungs with patchy areas of more dense airspace disease is compatible with with COVID-19 pneumonia.  Small pleural effusions bilaterally.  A 2.5 x 2 cm right lower lobe nodular opacity appears more nodular and solid.  Numerous mediastinal and hilar lymph nodes measure up to 1.5 cm short axis in subcarina region and 1.4 cm short axis in the left hilum.  There is confluent soft tissue in the right hilum, surrounding the right upper lobe bronchus without associated airway narrowing.Underlying malignancy/lung cancer is not excluded.  Repeat chest CT scan is recommended in approximately one month after therapy and resolution of COVID-19 pneumonia two reassess the lung findings.  < end of copied text >    < from: US Duplex Venous Lower Ext Complete, Bilateral (08.29.21 @ 08:51) >  Below-the-knee left deep veinthrombosis involving the gastrocnemius and posterior tibial veins.  < end of copied text >      Advanced directives addressed: full resuscitation

## 2021-09-01 NOTE — PROGRESS NOTE ADULT - ASSESSMENT
71 year-old man, active smoker, with COVID pneumonia and ARDS.  Additionally has noted by radiology to have nodular opacity in RLL and mediastinal adenopathy   Also found to have LE DVT  Oxygen requirements are improving.     Recommendations:  --c/w high flow nasal cannula, wean as tolerated.    --c/w treatment for COVID with decadron, remdesivir, and supportive care.  --On full dose lovenox  --Also started on antibitoics by ID possible superimposed bacterial pneumonia.   Abnormal CT possible underlying malignancy, but will need follow up after discharge.

## 2021-09-01 NOTE — PROGRESS NOTE ADULT - SUBJECTIVE AND OBJECTIVE BOX
CC: COVID     HPI:  Pt is a 72 yo male with a pmh/o HTN, HLD, skin CA who is a smoker and who quit smoking two weeks ago due to malaise, who presents to ED today due to worsening shortness of breath, cough, body aches and pains, fever, chills. Pt states symptoms have gotten significantly worse over past two days. Pt is not COVID vaccinated. Arrived with oxygen saturation at 86% on RA. Improved to mid 90's on 4L NC. Denies cp, palpitations, n/v/d, abd pain, constipation, rash, leg swelling, calf pain, dysuria, hematuria, sputum production, hemoptysis, HA.      INTERVAL HPI/ OVERNIGHT EVENTS: Pt was seen and examined,  doing well, no new complains. SOB improving, still with cough. No fevers. Good appetite       Vital Signs Last 24 Hrs  T(C): 37.1 (01 Sep 2021 08:05), Max: 37.2 (01 Sep 2021 00:07)  T(F): 98.8 (01 Sep 2021 08:05), Max: 99 (01 Sep 2021 00:07)  HR: 71 (01 Sep 2021 08:05) (71 - 83)  BP: 127/64 (01 Sep 2021 08:05) (127/64 - 132/77)  SpO2: 91% (01 Sep 2021 08:40) (91% - 94%)    REVIEW OF SYSTEMS:  All other review of systems is negative unless indicated above.      PHYSICAL EXAM:  General: Well developed: looks better,  NAD on HFNC  Eyes: EOMI; conjunctiva and sclera clear  Head: Normocephalic; atraumatic  ENMT: No nasal discharge; airway clear  Neck: Supple; non tender; no masses  Respiratory: Better air entry b/l,  No wheezes, rales or rhonchi  Cardiovascular: Regular rate and rhythm. S1 and S2 Normal;   Gastrointestinal: Soft , non  tender, non distended, + bowel sounds  Genitourinary: No  suprapubic  tenderness  Extremities: No LE  edema  Vascular: Peripheral pulses palpable 2+ bilaterally  Neurological: Alert and oriented x3, non focal   Musculoskeletal: Normal muscle tone and strength   Psychiatric: Cooperative        LABS:                         13.6   20.99 )-----------( 346                   38.8     09-02    x   |  x   |  x   ----------------------------<  x   x    |  x   |  0.78    Ca    8.4<L>      01 Sep 2021 08:04                         13.4   20.79 )-----------( 312      ( 31 Aug 2021 07:43 )             38.9     08-31    133<L>  |  102  |  26<H>  ----------------------------<  138<H>  4.6   |  24  |  0.79    Ca    8.3<L>      31 Aug 2021 07:43    TPro  6.4  /  Alb  2.2<L>  /  TBili  0.6  /  DBili  0.2  /  AST  52<H>  /  ALT  123<H>  /  AlkPhos  176<H>  08-31    LIVER FUNCTIONS - ( 31 Aug 2021 07:43 )  Alb: 2.2 g/dL / Pro: 6.4 gm/dL / ALK PHOS: 176 U/L / ALT: 123 U/L / AST: 52 U/L / GGT: x           PT/INR - ( 31 Aug 2021 07:43 )   PT: 13.5 sec;   INR: 1.16 ratio                            14.1   19.03 )-----------( 266      ( 30 Aug 2021 08:55 )             40.6     08-30    x   |  x   |  x   ----------------------------<  x   x    |  x   |  0.94    Ca    8.1<L>      30 Aug 2021 08:55    TPro  6.9  /  Alb  2.4<L>  /  TBili  0.6  /  DBili  0.2  /  AST  101<H>  /  ALT  145<H>  /  AlkPhos  202<H>  08-30  LIVER FUNCTIONS - ( 30 Aug 2021 12:24 )  Alb: 2.4 g/dL / Pro: 6.9 gm/dL / ALK PHOS: 202 U/L / ALT: 145 U/L / AST: 101 U/L / GGT: x           PT/INR - ( 30 Aug 2021 12:24 )   PT: 14.1 sec;   INR: 1.23 ratio                                13.9   18.34 )-----------( 210      ( 29 Aug 2021 08:12 )             39.2     08-29    135  |  103  |  24<H>  ----------------------------<  127<H>  4.1   |  26  |  0.75    Ca    8.2<L>      29 Aug 2021 08:12                          13.1   17.99 )-----------( 225      ( 28 Aug 2021 08:21 )             36.9     08-28    133<L>  |  102  |  21  ----------------------------<  124<H>  3.9   |  24  |  0.78    Ca    7.7<L>      28 Aug 2021 08:21    TPro  5.9<L>  /  Alb  2.4<L>  /  TBili  0.8  /  DBili  x   /  AST  91<H>  /  ALT  139<H>  /  AlkPhos  167<H>  08-2  LIVER FUNCTIONS - ( 28 Aug 2021 08:21 )  Alb: 2.4 g/dL / Pro: 5.9 gm/dL / ALK PHOS: 167 U/L / ALT: 139 U/L / AST: 91 U/L / GGT: x           PT/INR - ( 28 Aug 2021 08:21 )   PT: 14.1 sec;   INR: 1.22 ratio    PTT - ( 28 Aug 2021 08:21 )  PTT:25.1 sec      CARDIAC MARKERS ( 25 Aug 2021 20:49 )  <0.015 ng/mL / x     / 78 U/L / x     / x                                12.6   8.64  )-----------( 230      ( 26 Aug 2021 08:43 )             36.2     26 Aug 2021 08:43    137    |  108    |  19     ----------------------------<  269    3.6     |  22     |  0.89     Ca    8.3        26 Aug 2021 08:43    TPro  6.1    /  Alb  2.1    /  TBili  0.8    /  DBili  x      /  AST  57     /  ALT  78     /  AlkPhos  133    26 Aug 2021 08:43    PT/INR - ( 25 Aug 2021 20:49 )   PT: 14.0 sec;   INR: 1.22 ratio    PTT - ( 25 Aug 2021 20:49 )  PTT:25.0 sec      LIVER FUNCTIONS - ( 26 Aug 2021 08:43 )  Alb: 2.1 g/dL / Pro: 6.1 gm/dL / ALK PHOS: 133 U/L / ALT: 78 U/L / AST: 57 U/L / GGT: x               Culture - Blood (08.25.21 @ 20:49)   - Coagulase negative Staphylococcus: Detec   Gram Stain:   Growth in aerobic bottle: Gram Positive Cocci in Clusters   Growth in anaerobic bottle: Gram Positive Cocci in Clusters   Specimen Source: .Blood None   Organism: Blood Culture PCR   Culture Results:   Growth in aerobic and anaerobic bottles: Staphylococcus hominis   Coag Negative Staphylococcus   Single set isolate, possible contaminant.     Culture - Sputum . (08.27.21 @ 05:21)   Gram Stain:   Few polymorphonuclear leukocytes per low power field   Few Squamous epithelial cells per low power field   Moderate Gram Positive Cocci in Clusters per oil power field   Few Gram Negative Rods per oil power field   Few Gram Positive Rods per oil power field   Few Gram Negative Diplococci per oil power field   - Amikacin: S <=16   - Amoxicillin/Clavulanic Acid: S <=8/4   - Ampicillin: S <=8 These ampicillin results predict results for amoxicillin   - Ampicillin/Sulbactam: S <=4/2 Enterobacter, Citrobacter, and Serratia may develop resistance during prolonged therapy (3-4 days)   - Aztreonam: S <=4   - Cefazolin: S <=2 Enterobacter, Citrobacter, and Serratia may develop resistance during prolonged therapy (3-4 days)   - Cefepime: S <=2   - Cefoxitin: S <=8   - Ceftriaxone: S <=1 Enterobacter, Citrobacter, and Serratia may develop resistance during prolonged therapy   - Ciprofloxacin: S <=0.25   - Ertapenem: S <=0.5   - Gentamicin: S <=2   - Imipenem: S <=1   - Levofloxacin: S <=0.5   - Meropenem: S <=1   - Piperacillin/Tazobactam: S <=8   - Tobramycin: S <=2   - Trimethoprim/Sulfamethoxazole: S <=0.5/9.5   Specimen Source: .Sputum Sputum   Culture Results:   Moderate Escherichia coli   Normal Respiratory Celine present   Organism Identification: Escherichia coli   Organism: Escherichia coli   Method Type: CRISTINA     MEDICATIONS  (STANDING):  amLODIPine   Tablet 5 milliGRAM(s) Oral daily  cefTRIAXone Injectable. 2000 milliGRAM(s) IV Push every 24 hours  dexAMETHasone  Injectable 6 milliGRAM(s) IV Push daily  dextrose 40% Gel 15 Gram(s) Oral once  dextrose 5%. 1000 milliLiter(s) (50 mL/Hr) IV Continuous <Continuous>  dextrose 5%. 1000 milliLiter(s) (100 mL/Hr) IV Continuous <Continuous>  dextrose 50% Injectable 25 Gram(s) IV Push once  dextrose 50% Injectable 12.5 Gram(s) IV Push once  dextrose 50% Injectable 25 Gram(s) IV Push once  DULoxetine 60 milliGRAM(s) Oral daily  enoxaparin Injectable 90 milliGRAM(s) SubCutaneous two times a day  glucagon  Injectable 1 milliGRAM(s) IntraMuscular once  insulin glargine Injectable (LANTUS) 4 Unit(s) SubCutaneous at bedtime  insulin lispro (ADMELOG) corrective regimen sliding scale   SubCutaneous three times a day before meals  insulin lispro (ADMELOG) corrective regimen sliding scale   SubCutaneous at bedtime  insulin lispro Injectable (ADMELOG) 3 Unit(s) SubCutaneous three times a day before meals  melatonin 5 milliGRAM(s) Oral at bedtime  polyethylene glycol 3350 17 Gram(s) Oral daily  remdesivir  IVPB 100 milliGRAM(s) IV Intermittent every 24 hours    MEDICATIONS  (PRN):  acetaminophen   Tablet .. 650 milliGRAM(s) Oral every 4 hours PRN Temp greater or equal to 38C (100.4F), Mild Pain (1 - 3)  ALBUTerol    90 MICROgram(s) HFA Inhaler 2 Puff(s) Inhalation every 4 hours PRN Shortness of Breath and/or Wheezing  benzonatate 100 milliGRAM(s) Oral three times a day PRN Cough  FIRST- Mouthwash  BLM 15 milliLiter(s) Swish and Spit four times a day PRN Mouth Care  guaifenesin/dextromethorphan Oral Liquid 10 milliLiter(s) Oral every 4 hours PRN Cough  ondansetron Injectable 4 milliGRAM(s) IV Push every 8 hours PRN Nausea and/or Vomiting  senna 2 Tablet(s) Oral at bedtime PRN Constipation  simethicone 80 milliGRAM(s) Chew four times a day PRN Gas  zolpidem 5 milliGRAM(s) Oral at bedtime PRN Insomnia  zolpidem 5 milliGRAM(s) Oral at bedtime PRN Insomnia      RADIOLOGY & ADDITIONAL TESTS:    EXAM:  CT ANGIO CHEST PULM ART [facility]                        PROCEDURE DATE:  08/25/2021        INTERPRETATION:  CLINICAL INFORMATION: COVID positive.  Positive d-dimer.    COMPARISON: 11/16/2012    CONTRAST/COMPLICATIONS:  IV Contrast: Omnipaque 350  90 cc administered   10 cc discarded  Oral Contrast: NONE  Complications: None reported at time of study completion    PROCEDURE:  CT Angiography of the Chest.  Sagittal and coronal reformats were performed as well as 3D (MIP) reconstructions.    FINDINGS:    LUNGS AND AIRWAYS: Patent central airways.  There is severe patchy and partially confluent groundglass opacity in both lungs with patchy areas of more dense airspace disease.  A 2.5 x 2 cm right lower lobe nodular opacity appears more nodular and solid  PLEURA: Small pleural effusions bilaterally.  MEDIASTINUM AND DONAVON: There are numerous mediastinal and hilar lymph nodes measuring up to approximately 1.5 cm short access in the subcarinal region (2:58) and 1.4 cm short access in the left hilum (2:51).  There is confluent soft tissue in the right hilum, surrounding the right upper lobe bronchus (2:45; there is no narrowing of the airway.  VESSELS: No pulmonary embolism.  Mild atherosclerosis of the visualized aorta and branch vessels.  HEART: The heart appears mildly enlarged.  Mild to moderate atherosclerotic calcification of the coronary arteries. No pericardial effusion.  CHEST WALL AND LOWER NECK: Within normal limits.  VISUALIZED UPPER ABDOMEN: Small hiatal hernia.  Enlarged spleen measures up to 15.3 cm in the axial plane (2:114.  Multiple left upper pole renal cysts are noted.  BONES: Degenerative changes in the spine.    IMPRESSION:  No pulmonary embolism.    Severe patchy and partially confluent groundglass opacity in both lungs with patchy areas of more dense airspace disease is compatible with with COVID-19 pneumonia.    Small pleural effusions bilaterally.    A 2.5 x 2 cm right lower lobe nodular opacity appears more nodular and solid.  Numerous mediastinal and hilar lymph nodes measure up to 1.5 cm short axis in subcarina region and 1.4 cm short axis in the left hilum.  There is confluent soft tissue in the right hilum, surrounding the right upper lobe bronchus without associated airway narrowing.Underlying malignancy/lung cancer is not excluded.  Repeat chest CT scan is recommended in approximately one month after therapy and resolution of COVID-19 pneumonia two reassess the lung findings.

## 2021-09-01 NOTE — PROGRESS NOTE ADULT - ASSESSMENT
72 y/o male with h/o HTN, HLD, skin CA was admitted on 8/25 for worsening shortness of breath, cough, body aches and pains, fever, chills. Pt states symptoms have gotten significantly worse over past two days PTA. In ER, he arrived with oxygen saturation at 86% on RA. Improved to mid 90's on 4L NC. Denies fever or chills at home.     1. COVID-19 viral syndrome. Multifocal pneumonia. Possible superimposed bacterial pneumonia with E.coli. Bacteremia with CNST ?contaminant. Acute respiratory failure. Left leg DVT. Allergy to PCN.   -low grade fever  -leukocytosis ?related to steroids  -respiratory frail  -on remdesivir protocol # 7  -tolerating abx well so far; no side effects noted  -repeat BC x 2 are negative to date  -on ceftriaxone 2 gm IV qd # 4  -monitor closely in kelly of PCN allergy history  -O2 therapy  -steroids  -on full AC  -droplet isolation  -respiratory care  -continue antiviral and abx therapy  -monitor temps  -f/u CBC  -supportive care  2. Other issues:   -care per medicine

## 2021-09-02 LAB
ALBUMIN SERPL ELPH-MCNC: 2.4 G/DL — LOW (ref 3.3–5)
ALP SERPL-CCNC: 178 U/L — HIGH (ref 40–120)
ALT FLD-CCNC: 166 U/L — HIGH (ref 12–78)
AST SERPL-CCNC: 53 U/L — HIGH (ref 15–37)
BILIRUB DIRECT SERPL-MCNC: 0.2 MG/DL — SIGNIFICANT CHANGE UP (ref 0–0.2)
BILIRUB INDIRECT FLD-MCNC: 0.3 MG/DL — SIGNIFICANT CHANGE UP (ref 0.2–1)
BILIRUB SERPL-MCNC: 0.5 MG/DL — SIGNIFICANT CHANGE UP (ref 0.2–1.2)
CREAT SERPL-MCNC: 0.78 MG/DL — SIGNIFICANT CHANGE UP (ref 0.5–1.3)
INR BLD: 1.04 RATIO — SIGNIFICANT CHANGE UP (ref 0.88–1.16)
PROT SERPL-MCNC: 6.6 GM/DL — SIGNIFICANT CHANGE UP (ref 6–8.3)
PROTHROM AB SERPL-ACNC: 12.1 SEC — SIGNIFICANT CHANGE UP (ref 10.6–13.6)

## 2021-09-02 PROCEDURE — 71045 X-RAY EXAM CHEST 1 VIEW: CPT | Mod: 26

## 2021-09-02 PROCEDURE — 99233 SBSQ HOSP IP/OBS HIGH 50: CPT

## 2021-09-02 PROCEDURE — 99232 SBSQ HOSP IP/OBS MODERATE 35: CPT

## 2021-09-02 RX ADMIN — ENOXAPARIN SODIUM 90 MILLIGRAM(S): 100 INJECTION SUBCUTANEOUS at 10:24

## 2021-09-02 RX ADMIN — Medication 6 MILLIGRAM(S): at 10:27

## 2021-09-02 RX ADMIN — INSULIN GLARGINE 4 UNIT(S): 100 INJECTION, SOLUTION SUBCUTANEOUS at 21:27

## 2021-09-02 RX ADMIN — Medication 3 UNIT(S): at 17:12

## 2021-09-02 RX ADMIN — ENOXAPARIN SODIUM 90 MILLIGRAM(S): 100 INJECTION SUBCUTANEOUS at 20:26

## 2021-09-02 RX ADMIN — AMLODIPINE BESYLATE 5 MILLIGRAM(S): 2.5 TABLET ORAL at 10:24

## 2021-09-02 RX ADMIN — Medication 4: at 13:16

## 2021-09-02 RX ADMIN — Medication 5 MILLIGRAM(S): at 21:28

## 2021-09-02 RX ADMIN — Medication 3 UNIT(S): at 08:43

## 2021-09-02 RX ADMIN — REMDESIVIR 500 MILLIGRAM(S): 5 INJECTION INTRAVENOUS at 20:26

## 2021-09-02 RX ADMIN — Medication 2: at 17:12

## 2021-09-02 RX ADMIN — CEFTRIAXONE 2000 MILLIGRAM(S): 500 INJECTION, POWDER, FOR SOLUTION INTRAMUSCULAR; INTRAVENOUS at 10:25

## 2021-09-02 RX ADMIN — Medication 0: at 21:28

## 2021-09-02 RX ADMIN — ZOLPIDEM TARTRATE 5 MILLIGRAM(S): 10 TABLET ORAL at 21:29

## 2021-09-02 RX ADMIN — DULOXETINE HYDROCHLORIDE 60 MILLIGRAM(S): 30 CAPSULE, DELAYED RELEASE ORAL at 10:24

## 2021-09-02 RX ADMIN — Medication 3 UNIT(S): at 13:16

## 2021-09-02 NOTE — PROGRESS NOTE ADULT - ASSESSMENT
72 y/o male with h/o HTN, HLD, skin CA was admitted on 8/25 for worsening shortness of breath, cough, body aches and pains, fever, chills. Pt states symptoms have gotten significantly worse over past two days PTA. In ER, he arrived with oxygen saturation at 86% on RA. Improved to mid 90's on 4L NC. Denies fever or chills at home.     1. COVID-19 viral syndrome. Multifocal pneumonia. Possible superimposed bacterial pneumonia with E.coli. Bacteremia with CNST ?contaminant. Acute respiratory failure. Left leg DVT. Allergy to PCN.   -low grade fever  -leukocytosis ?related to steroids  -respiratory frail  -on remdesivir protocol # 8  -tolerating abx well so far; no side effects noted  -repeat BC x 2 are negative to date  -on ceftriaxone 2 gm IV qd # 5  -monitor closely in kelly of PCN allergy history  -O2 therapy  -steroids  -on full AC  -droplet isolation  -respiratory care  -continue antiviral and abx therapy  -monitor temps  -f/u CBC  -supportive care  2. Other issues:   -care per medicine

## 2021-09-02 NOTE — PROGRESS NOTE ADULT - SUBJECTIVE AND OBJECTIVE BOX
Date of service: 09-02-21 @ 09:59    OOB to chair  Has cough  SOB with light exercise  No fever    ROS: no fever or chills; denies dizziness, no HA, no abdominal pain, no diarrhea or constipation; no dysuria, no legs pain, no rashes    MEDICATIONS  (STANDING):  amLODIPine   Tablet 5 milliGRAM(s) Oral daily  cefTRIAXone Injectable. 2000 milliGRAM(s) IV Push every 24 hours  dexAMETHasone  Injectable 6 milliGRAM(s) IV Push daily  dextrose 40% Gel 15 Gram(s) Oral once  dextrose 5%. 1000 milliLiter(s) (50 mL/Hr) IV Continuous <Continuous>  dextrose 5%. 1000 milliLiter(s) (100 mL/Hr) IV Continuous <Continuous>  dextrose 50% Injectable 12.5 Gram(s) IV Push once  dextrose 50% Injectable 25 Gram(s) IV Push once  dextrose 50% Injectable 25 Gram(s) IV Push once  DULoxetine 60 milliGRAM(s) Oral daily  enoxaparin Injectable 90 milliGRAM(s) SubCutaneous two times a day  glucagon  Injectable 1 milliGRAM(s) IntraMuscular once  insulin glargine Injectable (LANTUS) 4 Unit(s) SubCutaneous at bedtime  insulin lispro (ADMELOG) corrective regimen sliding scale   SubCutaneous three times a day before meals  insulin lispro (ADMELOG) corrective regimen sliding scale   SubCutaneous at bedtime  insulin lispro Injectable (ADMELOG) 3 Unit(s) SubCutaneous three times a day before meals  melatonin 5 milliGRAM(s) Oral at bedtime  polyethylene glycol 3350 17 Gram(s) Oral daily  remdesivir  IVPB 100 milliGRAM(s) IV Intermittent every 24 hours    Vital Signs Last 24 Hrs  T(C): 36.9 (02 Sep 2021 09:00), Max: 36.9 (02 Sep 2021 09:00)  T(F): 98.4 (02 Sep 2021 09:00), Max: 98.4 (02 Sep 2021 09:00)  HR: 68 (02 Sep 2021 09:00) (67 - 80)  BP: 116/61 (02 Sep 2021 09:00) (116/61 - 134/69)  BP(mean): --  RR: 16 (02 Sep 2021 09:00) (16 - 16)  SpO2: 95% (02 Sep 2021 09:56) (95% - 99%)     Physical exam:    Vital Signs Last 24 Hrs  T(C): 37.1 (01 Sep 2021 08:05), Max: 37.2 (01 Sep 2021 00:07)  T(F): 98.8 (01 Sep 2021 08:05), Max: 99 (01 Sep 2021 00:07)  HR: 71 (01 Sep 2021 08:05) (71 - 83)  BP: 127/64 (01 Sep 2021 08:05) (127/64 - 132/77)  BP(mean): --  RR: --  SpO2: 91% (01 Sep 2021 08:40) (91% - 94%)     Physical exam:    Constitutional:  No acute distress  HEENT: NC/AT, EOMI, PERRLA, conjunctivae clear  Neck: supple; thyroid not palpable  Back: no tenderness  Respiratory: respiratory effort normal; crackles at bases  Cardiovascular: S1S2 regular, no murmurs  Abdomen: soft, not tender, not distended, positive BS  Genitourinary: no suprapubic tenderness  Lymphatic: no LN palpable  Musculoskeletal: no muscle tenderness, no joint swelling or tenderness  Extremities: no pedal edema  Neurological/ Psychiatric: AxOx3, moving all extremities  Skin: no rashes; no palpable lesions    Labs: reviewed                        13.6   20.99 )-----------( 346      ( 01 Sep 2021 08:04 )             38.8     09-02    x   |  x   |  x   ----------------------------<  x   x    |  x   |  0.78    Ca    8.4<L>      01 Sep 2021 08:04    TPro  6.6  /  Alb  2.4<L>  /  TBili  0.5  /  DBili  0.2  /  AST  53<H>  /  ALT  166<H>  /  AlkPhos  178<H>  09-02    Ferritin, Serum: 2309 ng/mL (09-01-21 @ 08:04)  C-Reactive Protein, Serum: 70 mg/L (09-01-21 @ 08:04)  D-Dimer Assay, Quantitative: 1927 ng/mL DDU (09-01-21 @ 08:04)  D-Dimer Assay, Quantitative: 9326 ng/mL DDU (08-29-21 @ 08:12)  D-Dimer Assay, Quantitative: 5563 ng/mL DDU (08-28-21 @ 08:21)  C-Reactive Protein, Serum: 224 mg/L (08-25-21 @ 20:49)  D-Dimer Assay, Quantitative: 1819 ng/mL DDU (08-25-21 @ 20:49)  Ferritin, Serum: 1675 ng/mL (08-25-21 @ 20:49)                        13.4   20.79 )-----------( 312      ( 31 Aug 2021 07:43 )             38.9     08-31    133<L>  |  102  |  26<H>  ----------------------------<  138<H>  4.6   |  24  |  0.79    Ca    8.3<L>      31 Aug 2021 07:43    TPro  6.4  /  Alb  2.2<L>  /  TBili  0.6  /  DBili  0.2  /  AST  52<H>  /  ALT  123<H>  /  AlkPhos  176<H>  08-31    D-Dimer Assay, Quantitative: 9326 ng/mL DDU (08-29-21 @ 08:12)  D-Dimer Assay, Quantitative: 5563 ng/mL DDU (08-28-21 @ 08:21)  C-Reactive Protein, Serum: 224 mg/L (08-25-21 @ 20:49)  D-Dimer Assay, Quantitative: 1819 ng/mL DDU (08-25-21 @ 20:49)  Ferritin, Serum: 1675 ng/mL (08-25-21 @ 20:49)                        12.5   22.44 )-----------( 298      ( 27 Aug 2021 08:53 )             35.3     08-27    138  |  106  |  19  ----------------------------<  143<H>  3.9   |  24  |  0.72    Ca    8.4<L>      27 Aug 2021 08:53    TPro  6.1  /  Alb  2.1<L>  /  TBili  0.8  /  DBili  x   /  AST  57<H>  /  ALT  78  /  AlkPhos  133<H>  08-26        C-Reactive Protein, Serum: 224 mg/L (08-25-21 @ 20:49)  D-Dimer Assay, Quantitative: 1819 ng/mL DDU (08-25-21 @ 20:49)  Ferritin, Serum: 1675 ng/mL (08-25-21 @ 20:49)                        12.6   8.64  )-----------( 230      ( 26 Aug 2021 08:43 )             36.2     08-26    Culture - Sputum (collected 27 Aug 2021 05:21)  Source: .Sputum Sputum  Gram Stain (28 Aug 2021 13:37):    Few polymorphonuclear leukocytes per low power field    Few Squamous epithelial cells per low power field    Moderate Gram Positive Cocci in Clusters per oil power field    Few Gram Negative Rods per oil power field    Few Gram Positive Rods per oil power field    Few Gram Negative Diplococci per oil power field  Preliminary Report (30 Aug 2021 09:39):    Moderate Escherichia coli    Normal Respiratory Celine present  Organism: Escherichia coli (30 Aug 2021 09:38)  Organism: Escherichia coli (30 Aug 2021 09:38)      -  Amikacin: S <=16      -  Amoxicillin/Clavulanic Acid: S <=8/4      -  Ampicillin: S <=8 These ampicillin results predict results for amoxicillin      -  Ampicillin/Sulbactam: S <=4/2 Enterobacter, Citrobacter, and Serratia may develop resistance during prolonged therapy (3-4 days)      -  Aztreonam: S <=4      -  Cefazolin: S <=2 Enterobacter, Citrobacter, and Serratia may develop resistance during prolonged therapy (3-4 days)      -  Cefepime: S <=2      -  Cefoxitin: S <=8      -  Ceftriaxone: S <=1 Enterobacter, Citrobacter, and Serratia may develop resistance during prolonged therapy      -  Ciprofloxacin: S <=0.25      -  Ertapenem: S <=0.5      -  Gentamicin: S <=2      -  Imipenem: S <=1      -  Levofloxacin: S <=0.5      -  Meropenem: S <=1      -  Piperacillin/Tazobactam: S <=8      -  Tobramycin: S <=2      -  Trimethoprim/Sulfamethoxazole: S <=0.5/9.5      Method Type: CRISTINA    Culture - Blood (collected 25 Aug 2021 20:49)  Source: .Blood None  Gram Stain (26 Aug 2021 19:21):    Growth in aerobic bottle: Gram Positive Cocci in Clusters    Growth in anaerobic bottle: Gram Positive Cocci in Clusters  Final Report (27 Aug 2021 17:40):    Growth in aerobic and anaerobic bottles: Staphylococcus hominis    Coag Negative Staphylococcus    Single set isolate, possible contaminant. Contact    Microbiology if susceptibility testing clinically    indicated.  Organism: Blood Culture PCR (27 Aug 2021 17:40)      -  Coagulase negative Staphylococcus: Detec      Method Type: PCR    BC: No growth to date. (08.29.21 @ 14:13)     137  |  108  |  19  ----------------------------<  269<H>  3.6   |  22  |  0.89    Ca    8.3<L>      26 Aug 2021 08:43    TPro  6.1  /  Alb  2.1<L>  /  TBili  0.8  /  DBili  x   /  AST  57<H>  /  ALT  78  /  AlkPhos  133<H>  08-26     LIVER FUNCTIONS - ( 26 Aug 2021 08:43 )  Alb: 2.1 g/dL / Pro: 6.1 gm/dL / ALK PHOS: 133 U/L / ALT: 78 U/L / AST: 57 U/L / GGT: x           COVID (08-25 @ 20:49)  Detected        Radiology: all available radiological tests reviewed    < from: CT Angio Chest PE Protocol w/ IV Cont (08.25.21 @ 22:53) >  No pulmonary embolism.  Severe patchy and partially confluent groundglass opacity in both lungs with patchy areas of more dense airspace disease is compatible with with COVID-19 pneumonia.  Small pleural effusions bilaterally.  A 2.5 x 2 cm right lower lobe nodular opacity appears more nodular and solid.  Numerous mediastinal and hilar lymph nodes measure up to 1.5 cm short axis in subcarina region and 1.4 cm short axis in the left hilum.  There is confluent soft tissue in the right hilum, surrounding the right upper lobe bronchus without associated airway narrowing.Underlying malignancy/lung cancer is not excluded.  Repeat chest CT scan is recommended in approximately one month after therapy and resolution of COVID-19 pneumonia two reassess the lung findings.  < end of copied text >    < from: US Duplex Venous Lower Ext Complete, Bilateral (08.29.21 @ 08:51) >  Below-the-knee left deep veinthrombosis involving the gastrocnemius and posterior tibial veins.  < end of copied text >      Advanced directives addressed: full resuscitation

## 2021-09-02 NOTE — PROGRESS NOTE ADULT - ASSESSMENT
71 year-old man, active smoker, with COVID pneumonia and ARDS.  --Additionally has noted by radiology to have nodular opacity in RLL and mediastinal adenopathy   --Also found to have LE DVT  --Oxygen requirements are improving.     Recommendations:  --c/w high flow nasal cannula, wean as tolerated.    --c/w treatment for COVID with decadron, remdesivir, and supportive care.  --On full dose lovenox  --Also started on antibitoics by ID possible superimposed bacterial pneumonia.   --Abnormal CT possible underlying malignancy, but will need follow up after discharge.

## 2021-09-02 NOTE — PROGRESS NOTE ADULT - SUBJECTIVE AND OBJECTIVE BOX
SUBJECTIVE:  Seen and examined at bedside, doing well.   Oxygenation requirements decreased today - currently on 45L, 45% on high flow NC.   At the time of my exam, sitting upright in chair.   Reports breathing is unchanged.   He is using incentive spirometer   He is unable to tolerate prone postioning     ROS otherwise negative.     PHYSICAL EXAMINATION:  GENERAL APPEARANCE:  No distress.   NECK:  Supple. No JVD.   CHEST:  Normal chest excursion. No audible wheezing.   ABDOMEN:  Soft, non-distended.   EXTREMITIES:  There is no cyanosis, clubbing or edema.   SKIN:  No rash or significant lesions are noted. No jaundice.  PSYCH: Normal affect.  NEURO: Alert and oriented. Responds to question appropriate. No focal deficits appreciated.     Vital Signs Last 24 Hrs  T(C): 36.9 (02 Sep 2021 09:00), Max: 36.9 (02 Sep 2021 09:00)  T(F): 98.4 (02 Sep 2021 09:00), Max: 98.4 (02 Sep 2021 09:00)  HR: 68 (02 Sep 2021 09:00) (67 - 80)  BP: 116/61 (02 Sep 2021 09:00) (116/61 - 134/69)  BP(mean): --  RR: 16 (02 Sep 2021 09:00) (16 - 16)  SpO2: 95% (02 Sep 2021 09:56) (95% - 99%)    MEDICATIONS  (STANDING):  amLODIPine   Tablet 5 milliGRAM(s) Oral daily  cefTRIAXone Injectable. 2000 milliGRAM(s) IV Push every 24 hours  dexAMETHasone  Injectable 6 milliGRAM(s) IV Push daily  dextrose 40% Gel 15 Gram(s) Oral once  dextrose 5%. 1000 milliLiter(s) (50 mL/Hr) IV Continuous <Continuous>  dextrose 5%. 1000 milliLiter(s) (100 mL/Hr) IV Continuous <Continuous>  dextrose 50% Injectable 12.5 Gram(s) IV Push once  dextrose 50% Injectable 25 Gram(s) IV Push once  dextrose 50% Injectable 25 Gram(s) IV Push once  DULoxetine 60 milliGRAM(s) Oral daily  enoxaparin Injectable 90 milliGRAM(s) SubCutaneous two times a day  glucagon  Injectable 1 milliGRAM(s) IntraMuscular once  insulin glargine Injectable (LANTUS) 4 Unit(s) SubCutaneous at bedtime  insulin lispro (ADMELOG) corrective regimen sliding scale   SubCutaneous three times a day before meals  insulin lispro (ADMELOG) corrective regimen sliding scale   SubCutaneous at bedtime  insulin lispro Injectable (ADMELOG) 3 Unit(s) SubCutaneous three times a day before meals  melatonin 5 milliGRAM(s) Oral at bedtime  polyethylene glycol 3350 17 Gram(s) Oral daily  remdesivir  IVPB 100 milliGRAM(s) IV Intermittent every 24 hours      LABS:                        13.6   20.99 )-----------( 346      ( 01 Sep 2021 08:04 )             38.8       Ca    8.4<L>      01 Sep 2021 08:04    TPro  6.6  /  Alb  2.4<L>  /  TBili  0.5  /  DBili  0.2  /  AST  53<H>  /  ALT  166<H>  /  AlkPhos  178<H>  09-02    LIVER FUNCTIONS - ( 02 Sep 2021 08:54 )  Alb: 2.4 g/dL / Pro: 6.6 gm/dL / ALK PHOS: 178 U/L / ALT: 166 U/L / AST: 53 U/L / GGT: x           PT/INR - ( 02 Sep 2021 08:54 )   PT: 12.1 sec;   INR: 1.04 ratio        RADIOLOGY & ADDITIONAL STUDIES:   *images personally reviewed.   CT Chest 8/25/21 -   IMPRESSION:  No pulmonary embolism.  Severe patchy and partially confluent groundglass opacity in both lungs with patchy areas of more dense airspace disease is compatible with with COVID-19 pneumonia.  Small pleural effusions bilaterally.  A 2.5 x 2 cm right lower lobe nodular opacity appears more nodular and solid. Numerous mediastinal and hilar lymph nodes measure up to 1.5 cm short axis in subcarina region and 1.4 cm short axis in the left hilum. There is confluent soft tissue in the right hilum, surrounding the right upper lobe bronchus without associated airway narrowing. Underlying malignancy/lung cancer is not excluded. Repeat chest CT scan is recommended in approximately one month after therapy and resolution of COVID-19 pneumonia two reassess the lung findings.    LE Doppler  IMPRESSION:  Below-the-knee left deep vein thrombosis involving the gastrocnemius and posterior tibial veins.

## 2021-09-02 NOTE — PROGRESS NOTE ADULT - SUBJECTIVE AND OBJECTIVE BOX
CC: COVID     HPI:  Pt is a 72 yo male with a pmh/o HTN, HLD, skin CA who is a smoker and who quit smoking two weeks ago due to malaise, who presents to ED today due to worsening shortness of breath, cough, body aches and pains, fever, chills. Pt states symptoms have gotten significantly worse over past two days. Pt is not COVID vaccinated. Arrived with oxygen saturation at 86% on RA. Improved to mid 90's on 4L NC. Denies cp, palpitations, n/v/d, abd pain, constipation, rash, leg swelling, calf pain, dysuria, hematuria, sputum production, hemoptysis, HA.      INTERVAL HPI/ OVERNIGHT EVENTS: Pt was seen and examined,  no new col[plains slowly getting better. SOB and cough improved. POC discussed     Vital Signs Last 24 Hrs  T(C): 37.2 (31 Aug 2021 07:46), Max: 37.2 (31 Aug 2021 07:46)  T(F): 98.9 (31 Aug 2021 07:46), Max: 98.9 (31 Aug 2021 07:46)  HR: 73 (31 Aug 2021 07:46) (70 - 87)  BP: 138/71 (31 Aug 2021 07:46) (134/74 - 138/71)  RR: 18 (31 Aug 2021 07:46) (18 - 18)  SpO2: 91% (31 Aug 2021 17:38) (91% - 96%)    REVIEW OF SYSTEMS:  All other review of systems is negative unless indicated above.      PHYSICAL EXAM:  General: Well developed: looks better,  NAD on HFNC  Eyes: EOMI; conjunctiva and sclera clear  Head: Normocephalic; atraumatic  ENMT: No nasal discharge; airway clear  Neck: Supple; non tender; no masses  Respiratory: Better air entry b.l,  No wheezes, rales or rhonchi  Cardiovascular: Regular rate and rhythm. S1 and S2 Normal;   Gastrointestinal: Soft , non  tender, non distended, + bowel sounds  Genitourinary: No  suprapubic  tenderness  Extremities: No LE  edema  Vascular: Peripheral pulses palpable 2+ bilaterally  Neurological: Alert and oriented x3, non focal   Musculoskeletal: Normal muscle tone and strength   Psychiatric: Cooperative        LABS:                         13.6   20.99 )-----------( 346      ( 01 Sep 2021 08:04 )             38.8     09-02    x   |  x   |  x   ----------------------------<  x   x    |  x   |  0.78    Ca    8.4<L>      01 Sep 2021 08:04    TPro  6.6  /  Alb  2.4<L>  /  TBili  0.5  /  DBili  0.2  /  AST  53<H>  /  ALT  166<H>  /  AlkPhos  178<H>  09-02    LIVER FUNCTIONS - ( 02 Sep 2021 08:54 )  Alb: 2.4 g/dL / Pro: 6.6 gm/dL / ALK PHOS: 178 U/L / ALT: 166 U/L / AST: 53 U/L / GGT: x         PT/INR - ( 02 Sep 2021 08:54 )   PT: 12.1 sec;   INR: 1.04 ratio                                  13.4   20.79 )-----------( 312      ( 31 Aug 2021 07:43 )             38.9     08-31    133<L>  |  102  |  26<H>  ----------------------------<  138<H>  4.6   |  24  |  0.79    Ca    8.3<L>      31 Aug 2021 07:43    TPro  6.4  /  Alb  2.2<L>  /  TBili  0.6  /  DBili  0.2  /  AST  52<H>  /  ALT  123<H>  /  AlkPhos  176<H>  08-31    LIVER FUNCTIONS - ( 31 Aug 2021 07:43 )  Alb: 2.2 g/dL / Pro: 6.4 gm/dL / ALK PHOS: 176 U/L / ALT: 123 U/L / AST: 52 U/L / GGT: x           PT/INR - ( 31 Aug 2021 07:43 )   PT: 13.5 sec;   INR: 1.16 ratio                            14.1   19.03 )-----------( 266      ( 30 Aug 2021 08:55 )             40.6     08-30    x   |  x   |  x   ----------------------------<  x   x    |  x   |  0.94    Ca    8.1<L>      30 Aug 2021 08:55    TPro  6.9  /  Alb  2.4<L>  /  TBili  0.6  /  DBili  0.2  /  AST  101<H>  /  ALT  145<H>  /  AlkPhos  202<H>  08-30  LIVER FUNCTIONS - ( 30 Aug 2021 12:24 )  Alb: 2.4 g/dL / Pro: 6.9 gm/dL / ALK PHOS: 202 U/L / ALT: 145 U/L / AST: 101 U/L / GGT: x           PT/INR - ( 30 Aug 2021 12:24 )   PT: 14.1 sec;   INR: 1.23 ratio                                13.9   18.34 )-----------( 210      ( 29 Aug 2021 08:12 )             39.2     08-29    135  |  103  |  24<H>  ----------------------------<  127<H>  4.1   |  26  |  0.75    Ca    8.2<L>      29 Aug 2021 08:12                          13.1   17.99 )-----------( 225      ( 28 Aug 2021 08:21 )             36.9     08-28    133<L>  |  102  |  21  ----------------------------<  124<H>  3.9   |  24  |  0.78    Ca    7.7<L>      28 Aug 2021 08:21    TPro  5.9<L>  /  Alb  2.4<L>  /  TBili  0.8  /  DBili  x   /  AST  91<H>  /  ALT  139<H>  /  AlkPhos  167<H>  08-2  LIVER FUNCTIONS - ( 28 Aug 2021 08:21 )  Alb: 2.4 g/dL / Pro: 5.9 gm/dL / ALK PHOS: 167 U/L / ALT: 139 U/L / AST: 91 U/L / GGT: x           PT/INR - ( 28 Aug 2021 08:21 )   PT: 14.1 sec;   INR: 1.22 ratio    PTT - ( 28 Aug 2021 08:21 )  PTT:25.1 sec      CARDIAC MARKERS ( 25 Aug 2021 20:49 )  <0.015 ng/mL / x     / 78 U/L / x     / x                                12.6   8.64  )-----------( 230      ( 26 Aug 2021 08:43 )             36.2     26 Aug 2021 08:43    137    |  108    |  19     ----------------------------<  269    3.6     |  22     |  0.89     Ca    8.3        26 Aug 2021 08:43    TPro  6.1    /  Alb  2.1    /  TBili  0.8    /  DBili  x      /  AST  57     /  ALT  78     /  AlkPhos  133    26 Aug 2021 08:43    PT/INR - ( 25 Aug 2021 20:49 )   PT: 14.0 sec;   INR: 1.22 ratio    PTT - ( 25 Aug 2021 20:49 )  PTT:25.0 sec      LIVER FUNCTIONS - ( 26 Aug 2021 08:43 )  Alb: 2.1 g/dL / Pro: 6.1 gm/dL / ALK PHOS: 133 U/L / ALT: 78 U/L / AST: 57 U/L / GGT: x               Culture - Blood (08.25.21 @ 20:49)   - Coagulase negative Staphylococcus: Detec   Gram Stain:   Growth in aerobic bottle: Gram Positive Cocci in Clusters   Growth in anaerobic bottle: Gram Positive Cocci in Clusters   Specimen Source: .Blood None   Organism: Blood Culture PCR   Culture Results:   Growth in aerobic and anaerobic bottles: Staphylococcus hominis   Coag Negative Staphylococcus   Single set isolate, possible contaminant.     Culture - Sputum . (08.27.21 @ 05:21)   Gram Stain:   Few polymorphonuclear leukocytes per low power field   Few Squamous epithelial cells per low power field   Moderate Gram Positive Cocci in Clusters per oil power field   Few Gram Negative Rods per oil power field   Few Gram Positive Rods per oil power field   Few Gram Negative Diplococci per oil power field   - Amikacin: S <=16   - Amoxicillin/Clavulanic Acid: S <=8/4   - Ampicillin: S <=8 These ampicillin results predict results for amoxicillin   - Ampicillin/Sulbactam: S <=4/2 Enterobacter, Citrobacter, and Serratia may develop resistance during prolonged therapy (3-4 days)   - Aztreonam: S <=4   - Cefazolin: S <=2 Enterobacter, Citrobacter, and Serratia may develop resistance during prolonged therapy (3-4 days)   - Cefepime: S <=2   - Cefoxitin: S <=8   - Ceftriaxone: S <=1 Enterobacter, Citrobacter, and Serratia may develop resistance during prolonged therapy   - Ciprofloxacin: S <=0.25   - Ertapenem: S <=0.5   - Gentamicin: S <=2   - Imipenem: S <=1   - Levofloxacin: S <=0.5   - Meropenem: S <=1   - Piperacillin/Tazobactam: S <=8   - Tobramycin: S <=2   - Trimethoprim/Sulfamethoxazole: S <=0.5/9.5   Specimen Source: .Sputum Sputum   Culture Results:   Moderate Escherichia coli   Normal Respiratory Celine present   Organism Identification: Escherichia coli   Organism: Escherichia coli   Method Type: CRISTINA     MEDICATIONS  (STANDING):  amLODIPine   Tablet 5 milliGRAM(s) Oral daily  cefTRIAXone Injectable. 2000 milliGRAM(s) IV Push every 24 hours  dexAMETHasone  Injectable 6 milliGRAM(s) IV Push daily  dextrose 40% Gel 15 Gram(s) Oral once  dextrose 5%. 1000 milliLiter(s) (50 mL/Hr) IV Continuous <Continuous>  dextrose 5%. 1000 milliLiter(s) (100 mL/Hr) IV Continuous <Continuous>  dextrose 50% Injectable 25 Gram(s) IV Push once  dextrose 50% Injectable 12.5 Gram(s) IV Push once  dextrose 50% Injectable 25 Gram(s) IV Push once  DULoxetine 60 milliGRAM(s) Oral daily  enoxaparin Injectable 90 milliGRAM(s) SubCutaneous two times a day  glucagon  Injectable 1 milliGRAM(s) IntraMuscular once  insulin glargine Injectable (LANTUS) 4 Unit(s) SubCutaneous at bedtime  insulin lispro (ADMELOG) corrective regimen sliding scale   SubCutaneous three times a day before meals  insulin lispro (ADMELOG) corrective regimen sliding scale   SubCutaneous at bedtime  insulin lispro Injectable (ADMELOG) 3 Unit(s) SubCutaneous three times a day before meals  melatonin 5 milliGRAM(s) Oral at bedtime  polyethylene glycol 3350 17 Gram(s) Oral daily  remdesivir  IVPB 100 milliGRAM(s) IV Intermittent every 24 hours    MEDICATIONS  (PRN):  acetaminophen   Tablet .. 650 milliGRAM(s) Oral every 4 hours PRN Temp greater or equal to 38C (100.4F), Mild Pain (1 - 3)  ALBUTerol    90 MICROgram(s) HFA Inhaler 2 Puff(s) Inhalation every 4 hours PRN Shortness of Breath and/or Wheezing  benzonatate 100 milliGRAM(s) Oral three times a day PRN Cough  FIRST- Mouthwash  BLM 15 milliLiter(s) Swish and Spit four times a day PRN Mouth Care  guaifenesin/dextromethorphan Oral Liquid 10 milliLiter(s) Oral every 4 hours PRN Cough  ondansetron Injectable 4 milliGRAM(s) IV Push every 8 hours PRN Nausea and/or Vomiting  senna 2 Tablet(s) Oral at bedtime PRN Constipation  simethicone 80 milliGRAM(s) Chew four times a day PRN Gas  zolpidem 5 milliGRAM(s) Oral at bedtime PRN Insomnia  zolpidem 5 milliGRAM(s) Oral at bedtime PRN Insomnia      RADIOLOGY & ADDITIONAL TESTS:    [facility]                        PROCEDURE DATE:  08/25/2021        INTERPRETATION:  CLINICAL INFORMATION: COVID positive.  Positive d-dimer.    COMPARISON: 11/16/2012    CONTRAST/COMPLICATIONS:  IV Contrast: Omnipaque 350  90 cc administered   10 cc discarded  Oral Contrast: NONE  Complications: None reported at time of study completion    PROCEDURE:  CT Angiography of the Chest.  Sagittal and coronal reformats were performed as well as 3D (MIP) reconstructions.    FINDINGS:    LUNGS AND AIRWAYS: Patent central airways.  There is severe patchy and partially confluent groundglass opacity in both lungs with patchy areas of more dense airspace disease.  A 2.5 x 2 cm right lower lobe nodular opacity appears more nodular and solid  PLEURA: Small pleural effusions bilaterally.  MEDIASTINUM AND DONAVON: There are numerous mediastinal and hilar lymph nodes measuring up to approximately 1.5 cm short access in the subcarinal region (2:58) and 1.4 cm short access in the left hilum (2:51).  There is confluent soft tissue in the right hilum, surrounding the right upper lobe bronchus (2:45; there is no narrowing of the airway.  VESSELS: No pulmonary embolism.  Mild atherosclerosis of the visualized aorta and branch vessels.  HEART: The heart appears mildly enlarged.  Mild to moderate atherosclerotic calcification of the coronary arteries. No pericardial effusion.  CHEST WALL AND LOWER NECK: Within normal limits.  VISUALIZED UPPER ABDOMEN: Small hiatal hernia.  Enlarged spleen measures up to 15.3 cm in the axial plane (2:114.  Multiple left upper pole renal cysts are noted.  BONES: Degenerative changes in the spine.    IMPRESSION:  No pulmonary embolism.    Severe patchy and partially confluent groundglass opacity in both lungs with patchy areas of more dense airspace disease is compatible with with COVID-19 pneumonia.    Small pleural effusions bilaterally.    A 2.5 x 2 cm right lower lobe nodular opacity appears more nodular and solid.  Numerous mediastinal and hilar lymph nodes measure up to 1.5 cm short axis in subcarina region and 1.4 cm short axis in the left hilum.  There is confluent soft tissue in the right hilum, surrounding the right upper lobe bronchus without associated airway narrowing.Underlying malignancy/lung cancer is not excluded.  Repeat chest CT scan is recommended in approximately one month after therapy and resolution of COVID-19 pneumonia two reassess the lung findings.

## 2021-09-02 NOTE — PROGRESS NOTE ADULT - ASSESSMENT
Pt is a 70 yo male with a pmh/o HTN, HLD, skin CA admitted for:       #Acute hypoxic respiratory Failure  secondary to COVID 19 PNA,  superimposed E.COLI PNA, also suspected underline mass and Mediastinal LAD  - CTA chest: neg for PE, extensive pulm infiltrates, with RLL nodularity and mediastinal mass, possible Lung  malignancy   - Maintain on airborne isolation.  - Continue with O2 via HF nasal cannula,  45LPM/45%,      keep O2 sats >92%  - continuous pulse ox  - Acetaminophen 650 mg PO q4h PRN fever  - HFA albuterol Q6 hour PRN   - C/w  Dexamethasone & Remdesivir Day 8  - C/w Ceftriaxone IV day 5  -Mucinex  -F/u sputum Cx  - trend proinflammatory markers   -will need f/u CT to follow up on infiltrates and possible mass  in 4-6 weeks   - D/w Pulm   -C/w  Dr Oshea      # Hyperglycemia  - HB A1c 6.3, pre diabetes   - BS elevated 2/2 dexamethasone  - Monitor BS, cover with ISS, on low dose Lantus while on steroids       #Hypokalemia  -repleted   - monitor     #HTN  - monitor BP, stable  - on amlodipine     # Constipation  CT abd neg for obstruction  bowel regiment        #  Coagulase negative Staph Bacteremia.    BCX x 1 set:  + Coagulase neg staph, likely contamination   repeat  Cx: NGTD  C/w IV  Rocephin  for sputum ECOLI coverage   D/w DR Oshea        # Acute LLE below the knee DVT  - as pt is high risk and cant r/o PE ( Pt is on HFNC) c/w  Full dose A/c  - Lovenox 90mg SQ BID      #Skin CA  Pt may use own 5FU cream topically as directed    # Constipation  Bowel regimen   had 2 BMs     # DVT PPX: Lovenox SQ    Dispo: monitor closely,  respiratory status better, titrate O2 as tolerated

## 2021-09-03 LAB
ANION GAP SERPL CALC-SCNC: 5 MMOL/L — SIGNIFICANT CHANGE UP (ref 5–17)
BUN SERPL-MCNC: 33 MG/DL — HIGH (ref 7–23)
CALCIUM SERPL-MCNC: 8.5 MG/DL — SIGNIFICANT CHANGE UP (ref 8.5–10.1)
CHLORIDE SERPL-SCNC: 100 MMOL/L — SIGNIFICANT CHANGE UP (ref 96–108)
CO2 SERPL-SCNC: 28 MMOL/L — SIGNIFICANT CHANGE UP (ref 22–31)
CREAT SERPL-MCNC: 0.91 MG/DL — SIGNIFICANT CHANGE UP (ref 0.5–1.3)
CULTURE RESULTS: SIGNIFICANT CHANGE UP
CULTURE RESULTS: SIGNIFICANT CHANGE UP
GLUCOSE SERPL-MCNC: 141 MG/DL — HIGH (ref 70–99)
HCT VFR BLD CALC: 41 % — SIGNIFICANT CHANGE UP (ref 39–50)
HGB BLD-MCNC: 14.2 G/DL — SIGNIFICANT CHANGE UP (ref 13–17)
INR BLD: 1.1 RATIO — SIGNIFICANT CHANGE UP (ref 0.88–1.16)
MCHC RBC-ENTMCNC: 32.6 PG — SIGNIFICANT CHANGE UP (ref 27–34)
MCHC RBC-ENTMCNC: 34.6 GM/DL — SIGNIFICANT CHANGE UP (ref 32–36)
MCV RBC AUTO: 94.3 FL — SIGNIFICANT CHANGE UP (ref 80–100)
PLATELET # BLD AUTO: 365 K/UL — SIGNIFICANT CHANGE UP (ref 150–400)
POTASSIUM SERPL-MCNC: 5.1 MMOL/L — SIGNIFICANT CHANGE UP (ref 3.5–5.3)
POTASSIUM SERPL-SCNC: 5.1 MMOL/L — SIGNIFICANT CHANGE UP (ref 3.5–5.3)
PROTHROM AB SERPL-ACNC: 12.7 SEC — SIGNIFICANT CHANGE UP (ref 10.6–13.6)
RBC # BLD: 4.35 M/UL — SIGNIFICANT CHANGE UP (ref 4.2–5.8)
RBC # FLD: 13 % — SIGNIFICANT CHANGE UP (ref 10.3–14.5)
SODIUM SERPL-SCNC: 133 MMOL/L — LOW (ref 135–145)
SPECIMEN SOURCE: SIGNIFICANT CHANGE UP
SPECIMEN SOURCE: SIGNIFICANT CHANGE UP
WBC # BLD: 20.7 K/UL — HIGH (ref 3.8–10.5)
WBC # FLD AUTO: 20.7 K/UL — HIGH (ref 3.8–10.5)

## 2021-09-03 PROCEDURE — 99232 SBSQ HOSP IP/OBS MODERATE 35: CPT

## 2021-09-03 PROCEDURE — 99233 SBSQ HOSP IP/OBS HIGH 50: CPT

## 2021-09-03 RX ORDER — LANOLIN ALCOHOL/MO/W.PET/CERES
5 CREAM (GRAM) TOPICAL ONCE
Refills: 0 | Status: COMPLETED | OUTPATIENT
Start: 2021-09-03 | End: 2021-09-03

## 2021-09-03 RX ADMIN — Medication 3 UNIT(S): at 13:02

## 2021-09-03 RX ADMIN — Medication 2: at 18:03

## 2021-09-03 RX ADMIN — Medication 2: at 21:30

## 2021-09-03 RX ADMIN — ENOXAPARIN SODIUM 90 MILLIGRAM(S): 100 INJECTION SUBCUTANEOUS at 21:30

## 2021-09-03 RX ADMIN — INSULIN GLARGINE 4 UNIT(S): 100 INJECTION, SOLUTION SUBCUTANEOUS at 21:30

## 2021-09-03 RX ADMIN — ENOXAPARIN SODIUM 90 MILLIGRAM(S): 100 INJECTION SUBCUTANEOUS at 10:11

## 2021-09-03 RX ADMIN — Medication 3 UNIT(S): at 18:03

## 2021-09-03 RX ADMIN — DULOXETINE HYDROCHLORIDE 60 MILLIGRAM(S): 30 CAPSULE, DELAYED RELEASE ORAL at 10:10

## 2021-09-03 RX ADMIN — AMLODIPINE BESYLATE 5 MILLIGRAM(S): 2.5 TABLET ORAL at 10:10

## 2021-09-03 RX ADMIN — REMDESIVIR 540 MILLIGRAM(S): 5 INJECTION INTRAVENOUS at 21:49

## 2021-09-03 RX ADMIN — Medication 5 MILLIGRAM(S): at 21:31

## 2021-09-03 RX ADMIN — Medication 3 UNIT(S): at 09:00

## 2021-09-03 RX ADMIN — Medication 6 MILLIGRAM(S): at 10:10

## 2021-09-03 RX ADMIN — CEFTRIAXONE 2000 MILLIGRAM(S): 500 INJECTION, POWDER, FOR SOLUTION INTRAMUSCULAR; INTRAVENOUS at 10:12

## 2021-09-03 RX ADMIN — Medication 4: at 13:01

## 2021-09-03 NOTE — PROGRESS NOTE ADULT - ASSESSMENT
71 year-old man, active smoker, with COVID pneumonia and ARDS.  --Additionally has noted by radiology to have nodular opacity in RLL and mediastinal adenopathy   --Also found to have LE DVT  --Oxygen requirements and clinical status has significantly improved.     Recommendations:  --c/w nasal cannula, wean as tolerated.    --Can increase flow or change to NRB for exertion/ambulation to the bathroom if needed.   --treatment with decadron, remdesivir, and supportive care.  --On full dose lovenox  --Also started on antibitoics by ID possible superimposed bacterial pneumonia.   --Abnormal CT possible underlying malignancy, will need follow up after discharge.

## 2021-09-03 NOTE — PROGRESS NOTE ADULT - SUBJECTIVE AND OBJECTIVE BOX
SUBJECTIVE:  Seen and examined at bedside, doing well.   Oxygenation requirements decreased today - currently on NC.   At the time of my exam, sitting upright in chair.   Reports breathing is improved.    He is using incentive spirometer   Ambulated to bathroom this AM.     ROS otherwise negative.     PHYSICAL EXAMINATION:  GENERAL APPEARANCE:  No distress.   NECK:  Supple. No JVD.   CHEST:  Normal chest excursion. No audible wheezing.   ABDOMEN:  Soft, non-distended.   EXTREMITIES:  There is no cyanosis, clubbing or edema.   SKIN:  No rash or significant lesions are noted. No jaundice.  PSYCH: Normal affect.  NEURO: Alert and oriented. Responds to question appropriate. No focal deficits appreciated.   LABS:               Vital Signs Last 24 Hrs  T(C): 36.5 (03 Sep 2021 09:08), Max: 36.8 (02 Sep 2021 16:07)  T(F): 97.7 (03 Sep 2021 09:08), Max: 98.3 (02 Sep 2021 16:07)  HR: 78 (03 Sep 2021 09:08) (69 - 78)  BP: 120/71 (03 Sep 2021 09:08) (120/71 - 147/77)  BP(mean): --  RR: 16 (03 Sep 2021 09:08) (16 - 18)  SpO2: 94% (03 Sep 2021 09:08) (93% - 99%)    MEDICATIONS  (STANDING):  amLODIPine   Tablet 5 milliGRAM(s) Oral daily  cefTRIAXone Injectable. 2000 milliGRAM(s) IV Push every 24 hours  dexAMETHasone  Injectable 6 milliGRAM(s) IV Push daily  dextrose 40% Gel 15 Gram(s) Oral once  dextrose 5%. 1000 milliLiter(s) (50 mL/Hr) IV Continuous <Continuous>  dextrose 5%. 1000 milliLiter(s) (100 mL/Hr) IV Continuous <Continuous>  dextrose 50% Injectable 25 Gram(s) IV Push once  dextrose 50% Injectable 12.5 Gram(s) IV Push once  dextrose 50% Injectable 25 Gram(s) IV Push once  DULoxetine 60 milliGRAM(s) Oral daily  enoxaparin Injectable 90 milliGRAM(s) SubCutaneous two times a day  glucagon  Injectable 1 milliGRAM(s) IntraMuscular once  insulin glargine Injectable (LANTUS) 4 Unit(s) SubCutaneous at bedtime  insulin lispro (ADMELOG) corrective regimen sliding scale   SubCutaneous three times a day before meals  insulin lispro (ADMELOG) corrective regimen sliding scale   SubCutaneous at bedtime  insulin lispro Injectable (ADMELOG) 3 Unit(s) SubCutaneous three times a day before meals  melatonin 5 milliGRAM(s) Oral at bedtime  polyethylene glycol 3350 17 Gram(s) Oral daily  remdesivir  IVPB 100 milliGRAM(s) IV Intermittent every 24 hours    MEDICATIONS  (PRN):  acetaminophen   Tablet .. 650 milliGRAM(s) Oral every 4 hours PRN Temp greater or equal to 38C (100.4F), Mild Pain (1 - 3)  ALBUTerol    90 MICROgram(s) HFA Inhaler 2 Puff(s) Inhalation every 4 hours PRN Shortness of Breath and/or Wheezing  benzonatate 100 milliGRAM(s) Oral three times a day PRN Cough  FIRST- Mouthwash  BLM 15 milliLiter(s) Swish and Spit four times a day PRN Mouth Care  guaifenesin/dextromethorphan Oral Liquid 10 milliLiter(s) Oral every 4 hours PRN Cough  ondansetron Injectable 4 milliGRAM(s) IV Push every 8 hours PRN Nausea and/or Vomiting  senna 2 Tablet(s) Oral at bedtime PRN Constipation  simethicone 80 milliGRAM(s) Chew four times a day PRN Gas        LABS:                        14.2   20.70 )-----------( 365      ( 03 Sep 2021 07:52 )             41.0     09-03    133<L>  |  100  |  33<H>  ----------------------------<  141<H>  5.1   |  28  |  0.91    Ca    8.5      03 Sep 2021 07:52    TPro  6.7  /  Alb  2.4<L>  /  TBili  0.5  /  DBili  0.2  /  AST  38<H>  /  ALT  141<H>  /  AlkPhos  163<H>  09-03    LIVER FUNCTIONS - ( 03 Sep 2021 07:52 )  Alb: 2.4 g/dL / Pro: 6.7 gm/dL / ALK PHOS: 163 U/L / ALT: 141 U/L / AST: 38 U/L / GGT: x           PT/INR - ( 03 Sep 2021 07:52 )   PT: 12.7 sec;   INR: 1.10 ratio           RADIOLOGY & ADDITIONAL STUDIES:     *images personally reviewed.     CXR 9/2/21   IMPRESSION: Improved aeration of bilateral infiltrates.    CT Chest 8/25/21 -   IMPRESSION:  No pulmonary embolism.  Severe patchy and partially confluent groundglass opacity in both lungs with patchy areas of more dense airspace disease is compatible with with COVID-19 pneumonia.  Small pleural effusions bilaterally.  A 2.5 x 2 cm right lower lobe nodular opacity appears more nodular and solid. Numerous mediastinal and hilar lymph nodes measure up to 1.5 cm short axis in subcarina region and 1.4 cm short axis in the left hilum. There is confluent soft tissue in the right hilum, surrounding the right upper lobe bronchus without associated airway narrowing. Underlying malignancy/lung cancer is not excluded. Repeat chest CT scan is recommended in approximately one month after therapy and resolution of COVID-19 pneumonia two reassess the lung findings.    LE Doppler  IMPRESSION:  Below-the-knee left deep vein thrombosis involving the gastrocnemius and posterior tibial veins.

## 2021-09-03 NOTE — PROGRESS NOTE ADULT - SUBJECTIVE AND OBJECTIVE BOX
CC: COVID     HPI:  Pt is a 70 yo male with a pmh/o HTN, HLD, skin CA who is a smoker and who quit smoking two weeks ago due to malaise, who presents to ED today due to worsening shortness of breath, cough, body aches and pains, fever, chills. Pt states symptoms have gotten significantly worse over past two days. Pt is not COVID vaccinated. Arrived with oxygen saturation at 86% on RA. Improved to mid 90's on 4L NC. Denies cp, palpitations, n/v/d, abd pain, constipation, rash, leg swelling, calf pain, dysuria, hematuria, sputum production, hemoptysis, HA.      INTERVAL HPI/ OVERNIGHT EVENTS: Pt was seen and examined,  no new col[plains slowly getting better. SOB and cough improved. POC discussed     REVIEW OF SYSTEMS:  All other review of systems is negative unless indicated above.    PHYSICAL EXAM:  General: Well developed: looks better,  NAD on HFNC  Eyes: EOMI; conjunctiva and sclera clear  Head: Normocephalic; atraumatic  ENMT: No nasal discharge; airway clear  Neck: Supple; non tender; no masses  Respiratory: Better air entry b.l,  No wheezes, rales or rhonchi  Cardiovascular: Regular rate and rhythm. S1 and S2 Normal;   Gastrointestinal: Soft , non  tender, non distended, + bowel sounds  Genitourinary: No  suprapubic  tenderness  Extremities: No LE  edema  Vascular: Peripheral pulses palpable 2+ bilaterally  Neurological: Alert and oriented x3, non focal   Musculoskeletal: Normal muscle tone and strength   Psychiatric: Cooperative        LABS:               CBC Full  -  ( 03 Sep 2021 07:52 )  WBC Count : 20.70 K/uL  RBC Count : 4.35 M/uL  Hemoglobin : 14.2 g/dL  Hematocrit : 41.0 %  Platelet Count - Automated : 365 K/uL  Mean Cell Volume : 94.3 fl  Mean Cell Hemoglobin : 32.6 pg  Mean Cell Hemoglobin Concentration : 34.6 gm/dL  Auto Neutrophil # : x  Auto Lymphocyte # : x  Auto Monocyte # : x  Auto Eosinophil # : x  Auto Basophil # : x  Auto Neutrophil % : x  Auto Lymphocyte % : x  Auto Monocyte % : x  Auto Eosinophil % : x  Auto Basophil % : x    PT/INR - ( 02 Sep 2021 08:54 )   PT: 12.1 sec;   INR: 1.04 ratio             09-02    x   |  x   |  x   ----------------------------<  x   x    |  x   |  0.78      TPro  6.6  /  Alb  2.4<L>  /  TBili  0.5  /  DBili  0.2  /  AST  53<H>  /  ALT  166<H>  /  AlkPhos  178<H>  09-02

## 2021-09-03 NOTE — PROGRESS NOTE ADULT - ASSESSMENT
70 y/o male with h/o HTN, HLD, skin CA was admitted on 8/25 for worsening shortness of breath, cough, body aches and pains, fever, chills. Pt states symptoms have gotten significantly worse over past two days PTA. In ER, he arrived with oxygen saturation at 86% on RA. Improved to mid 90's on 4L NC. Denies fever or chills at home.     1. COVID-19 viral syndrome. Multifocal pneumonia. Possible superimposed bacterial pneumonia with E.coli. Bacteremia with CNST ?contaminant. Acute respiratory failure. Left leg DVT. Allergy to PCN.   -low grade fever  -leukocytosis ?related to steroids  -respiratory frail  -on remdesivir protocol # 9  -tolerating abx well so far; no side effects noted  -repeat BC x 2 are negative to date  -on ceftriaxone 2 gm IV qd # 6  -monitor closely in kelly of PCN allergy history  -O2 therapy  -steroids  -on full AC  -droplet isolation  -respiratory care  -continue antiviral and abx therapy  -monitor temps  -f/u CBC  -supportive care  2. Other issues:   -care per medicine

## 2021-09-03 NOTE — PROGRESS NOTE ADULT - SUBJECTIVE AND OBJECTIVE BOX
Date of service: 09-03-21 @ 10:08    Sitting in bed in NAD  Alert and verbal  SOB with light exercise  Has dry cough    ROS: no fever or chills; denies dizziness, no HA, no abdominal pain, no diarrhea or constipation; no dysuria, no legs pain, no rashes    MEDICATIONS  (STANDING):  amLODIPine   Tablet 5 milliGRAM(s) Oral daily  cefTRIAXone Injectable. 2000 milliGRAM(s) IV Push every 24 hours  dexAMETHasone  Injectable 6 milliGRAM(s) IV Push daily  dextrose 40% Gel 15 Gram(s) Oral once  dextrose 5%. 1000 milliLiter(s) (50 mL/Hr) IV Continuous <Continuous>  dextrose 5%. 1000 milliLiter(s) (100 mL/Hr) IV Continuous <Continuous>  dextrose 50% Injectable 25 Gram(s) IV Push once  dextrose 50% Injectable 12.5 Gram(s) IV Push once  dextrose 50% Injectable 25 Gram(s) IV Push once  DULoxetine 60 milliGRAM(s) Oral daily  enoxaparin Injectable 90 milliGRAM(s) SubCutaneous two times a day  glucagon  Injectable 1 milliGRAM(s) IntraMuscular once  insulin glargine Injectable (LANTUS) 4 Unit(s) SubCutaneous at bedtime  insulin lispro (ADMELOG) corrective regimen sliding scale   SubCutaneous three times a day before meals  insulin lispro (ADMELOG) corrective regimen sliding scale   SubCutaneous at bedtime  insulin lispro Injectable (ADMELOG) 3 Unit(s) SubCutaneous three times a day before meals  melatonin 5 milliGRAM(s) Oral at bedtime  polyethylene glycol 3350 17 Gram(s) Oral daily  remdesivir  IVPB 100 milliGRAM(s) IV Intermittent every 24 hours    Vital Signs Last 24 Hrs  T(C): 36.5 (03 Sep 2021 09:08), Max: 36.8 (02 Sep 2021 16:07)  T(F): 97.7 (03 Sep 2021 09:08), Max: 98.3 (02 Sep 2021 16:07)  HR: 69 (03 Sep 2021 05:35) (69 - 75)  BP: 120/71 (03 Sep 2021 09:08) (120/71 - 147/77)  BP(mean): --  RR: 16 (03 Sep 2021 09:08) (16 - 18)  SpO2: 94% (03 Sep 2021 09:08) (93% - 99%)     Physical exam:    Constitutional:  No acute distress  HEENT: NC/AT, EOMI, PERRLA, conjunctivae clear  Neck: supple; thyroid not palpable  Back: no tenderness  Respiratory: respiratory effort normal; crackles at bases  Cardiovascular: S1S2 regular, no murmurs  Abdomen: soft, not tender, not distended, positive BS  Genitourinary: no suprapubic tenderness  Lymphatic: no LN palpable  Musculoskeletal: no muscle tenderness, no joint swelling or tenderness  Extremities: no pedal edema  Neurological/ Psychiatric: AxOx3, moving all extremities  Skin: no rashes; no palpable lesions    Labs: reviewed                        14.2   20.70 )-----------( 365      ( 03 Sep 2021 07:52 )             41.0     09-03    133<L>  |  100  |  33<H>  ----------------------------<  141<H>  5.1   |  28  |  0.91    Ca    8.5      03 Sep 2021 07:52    TPro  6.7  /  Alb  2.4<L>  /  TBili  0.5  /  DBili  0.2  /  AST  38<H>  /  ALT  141<H>  /  AlkPhos  163<H>  09-03    Ferritin, Serum: 2309 ng/mL (09-01-21 @ 08:04)  C-Reactive Protein, Serum: 70 mg/L (09-01-21 @ 08:04)  D-Dimer Assay, Quantitative: 1927 ng/mL DDU (09-01-21 @ 08:04)  D-Dimer Assay, Quantitative: 9326 ng/mL DDU (08-29-21 @ 08:12)  D-Dimer Assay, Quantitative: 5563 ng/mL DDU (08-28-21 @ 08:21)  C-Reactive Protein, Serum: 224 mg/L (08-25-21 @ 20:49)  D-Dimer Assay, Quantitative: 1819 ng/mL DDU (08-25-21 @ 20:49)  Ferritin, Serum: 1675 ng/mL (08-25-21 @ 20:49)                                   12.5   22.44 )-----------( 298      ( 27 Aug 2021 08:53 )             35.3     08-27    138  |  106  |  19  ----------------------------<  143<H>  3.9   |  24  |  0.72    Ca    8.4<L>      27 Aug 2021 08:53    TPro  6.1  /  Alb  2.1<L>  /  TBili  0.8  /  DBili  x   /  AST  57<H>  /  ALT  78  /  AlkPhos  133<H>  08-26        C-Reactive Protein, Serum: 224 mg/L (08-25-21 @ 20:49)  D-Dimer Assay, Quantitative: 1819 ng/mL DDU (08-25-21 @ 20:49)  Ferritin, Serum: 1675 ng/mL (08-25-21 @ 20:49)                        12.6   8.64  )-----------( 230      ( 26 Aug 2021 08:43 )             36.2     08-26    Culture - Sputum (collected 27 Aug 2021 05:21)  Source: .Sputum Sputum  Gram Stain (28 Aug 2021 13:37):    Few polymorphonuclear leukocytes per low power field    Few Squamous epithelial cells per low power field    Moderate Gram Positive Cocci in Clusters per oil power field    Few Gram Negative Rods per oil power field    Few Gram Positive Rods per oil power field    Few Gram Negative Diplococci per oil power field  Preliminary Report (30 Aug 2021 09:39):    Moderate Escherichia coli    Normal Respiratory Celine present  Organism: Escherichia coli (30 Aug 2021 09:38)  Organism: Escherichia coli (30 Aug 2021 09:38)      -  Amikacin: S <=16      -  Amoxicillin/Clavulanic Acid: S <=8/4      -  Ampicillin: S <=8 These ampicillin results predict results for amoxicillin      -  Ampicillin/Sulbactam: S <=4/2 Enterobacter, Citrobacter, and Serratia may develop resistance during prolonged therapy (3-4 days)      -  Aztreonam: S <=4      -  Cefazolin: S <=2 Enterobacter, Citrobacter, and Serratia may develop resistance during prolonged therapy (3-4 days)      -  Cefepime: S <=2      -  Cefoxitin: S <=8      -  Ceftriaxone: S <=1 Enterobacter, Citrobacter, and Serratia may develop resistance during prolonged therapy      -  Ciprofloxacin: S <=0.25      -  Ertapenem: S <=0.5      -  Gentamicin: S <=2      -  Imipenem: S <=1      -  Levofloxacin: S <=0.5      -  Meropenem: S <=1      -  Piperacillin/Tazobactam: S <=8      -  Tobramycin: S <=2      -  Trimethoprim/Sulfamethoxazole: S <=0.5/9.5      Method Type: CRISTINA    Culture - Blood (collected 25 Aug 2021 20:49)  Source: .Blood None  Gram Stain (26 Aug 2021 19:21):    Growth in aerobic bottle: Gram Positive Cocci in Clusters    Growth in anaerobic bottle: Gram Positive Cocci in Clusters  Final Report (27 Aug 2021 17:40):    Growth in aerobic and anaerobic bottles: Staphylococcus hominis    Coag Negative Staphylococcus    Single set isolate, possible contaminant. Contact    Microbiology if susceptibility testing clinically    indicated.  Organism: Blood Culture PCR (27 Aug 2021 17:40)      -  Coagulase negative Staphylococcus: Detec      Method Type: PCR    BC: No growth to date. (08.29.21 @ 14:13)     137  |  108  |  19  ----------------------------<  269<H>  3.6   |  22  |  0.89    Ca    8.3<L>      26 Aug 2021 08:43    TPro  6.1  /  Alb  2.1<L>  /  TBili  0.8  /  DBili  x   /  AST  57<H>  /  ALT  78  /  AlkPhos  133<H>  08-26     LIVER FUNCTIONS - ( 26 Aug 2021 08:43 )  Alb: 2.1 g/dL / Pro: 6.1 gm/dL / ALK PHOS: 133 U/L / ALT: 78 U/L / AST: 57 U/L / GGT: x           COVID (08-25 @ 20:49)  Detected        Radiology: all available radiological tests reviewed    < from: CT Angio Chest PE Protocol w/ IV Cont (08.25.21 @ 22:53) >  No pulmonary embolism.  Severe patchy and partially confluent groundglass opacity in both lungs with patchy areas of more dense airspace disease is compatible with with COVID-19 pneumonia.  Small pleural effusions bilaterally.  A 2.5 x 2 cm right lower lobe nodular opacity appears more nodular and solid.  Numerous mediastinal and hilar lymph nodes measure up to 1.5 cm short axis in subcarina region and 1.4 cm short axis in the left hilum.  There is confluent soft tissue in the right hilum, surrounding the right upper lobe bronchus without associated airway narrowing.Underlying malignancy/lung cancer is not excluded.  Repeat chest CT scan is recommended in approximately one month after therapy and resolution of COVID-19 pneumonia two reassess the lung findings.  < end of copied text >    < from: US Duplex Venous Lower Ext Complete, Bilateral (08.29.21 @ 08:51) >  Below-the-knee left deep veinthrombosis involving the gastrocnemius and posterior tibial veins.  < end of copied text >      Advanced directives addressed: full resuscitation

## 2021-09-04 LAB
ANION GAP SERPL CALC-SCNC: 4 MMOL/L — LOW (ref 5–17)
BUN SERPL-MCNC: 30 MG/DL — HIGH (ref 7–23)
CALCIUM SERPL-MCNC: 8.4 MG/DL — LOW (ref 8.5–10.1)
CHLORIDE SERPL-SCNC: 100 MMOL/L — SIGNIFICANT CHANGE UP (ref 96–108)
CO2 SERPL-SCNC: 27 MMOL/L — SIGNIFICANT CHANGE UP (ref 22–31)
CREAT SERPL-MCNC: 0.85 MG/DL — SIGNIFICANT CHANGE UP (ref 0.5–1.3)
GLUCOSE SERPL-MCNC: 180 MG/DL — HIGH (ref 70–99)
HCT VFR BLD CALC: 39.2 % — SIGNIFICANT CHANGE UP (ref 39–50)
HGB BLD-MCNC: 13.5 G/DL — SIGNIFICANT CHANGE UP (ref 13–17)
INR BLD: 1.02 RATIO — SIGNIFICANT CHANGE UP (ref 0.88–1.16)
MCHC RBC-ENTMCNC: 32.6 PG — SIGNIFICANT CHANGE UP (ref 27–34)
MCHC RBC-ENTMCNC: 34.4 GM/DL — SIGNIFICANT CHANGE UP (ref 32–36)
MCV RBC AUTO: 94.7 FL — SIGNIFICANT CHANGE UP (ref 80–100)
PLATELET # BLD AUTO: 399 K/UL — SIGNIFICANT CHANGE UP (ref 150–400)
POTASSIUM SERPL-MCNC: 4.6 MMOL/L — SIGNIFICANT CHANGE UP (ref 3.5–5.3)
POTASSIUM SERPL-SCNC: 4.6 MMOL/L — SIGNIFICANT CHANGE UP (ref 3.5–5.3)
PROTHROM AB SERPL-ACNC: 11.9 SEC — SIGNIFICANT CHANGE UP (ref 10.6–13.6)
RBC # BLD: 4.14 M/UL — LOW (ref 4.2–5.8)
RBC # FLD: 13 % — SIGNIFICANT CHANGE UP (ref 10.3–14.5)
SODIUM SERPL-SCNC: 131 MMOL/L — LOW (ref 135–145)
WBC # BLD: 24.97 K/UL — HIGH (ref 3.8–10.5)
WBC # FLD AUTO: 24.97 K/UL — HIGH (ref 3.8–10.5)

## 2021-09-04 PROCEDURE — 99232 SBSQ HOSP IP/OBS MODERATE 35: CPT

## 2021-09-04 RX ORDER — DEXAMETHASONE 0.5 MG/5ML
4 ELIXIR ORAL DAILY
Refills: 0 | Status: COMPLETED | OUTPATIENT
Start: 2021-09-04 | End: 2021-09-07

## 2021-09-04 RX ORDER — ZOLPIDEM TARTRATE 10 MG/1
5 TABLET ORAL AT BEDTIME
Refills: 0 | Status: DISCONTINUED | OUTPATIENT
Start: 2021-09-04 | End: 2021-09-04

## 2021-09-04 RX ADMIN — ZOLPIDEM TARTRATE 5 MILLIGRAM(S): 10 TABLET ORAL at 22:10

## 2021-09-04 RX ADMIN — Medication 2: at 12:51

## 2021-09-04 RX ADMIN — SIMETHICONE 80 MILLIGRAM(S): 80 TABLET, CHEWABLE ORAL at 03:09

## 2021-09-04 RX ADMIN — INSULIN GLARGINE 4 UNIT(S): 100 INJECTION, SOLUTION SUBCUTANEOUS at 21:42

## 2021-09-04 RX ADMIN — ENOXAPARIN SODIUM 90 MILLIGRAM(S): 100 INJECTION SUBCUTANEOUS at 22:10

## 2021-09-04 RX ADMIN — Medication 4 MILLIGRAM(S): at 10:54

## 2021-09-04 RX ADMIN — ENOXAPARIN SODIUM 90 MILLIGRAM(S): 100 INJECTION SUBCUTANEOUS at 10:49

## 2021-09-04 RX ADMIN — Medication 3 UNIT(S): at 12:51

## 2021-09-04 RX ADMIN — Medication 6: at 17:46

## 2021-09-04 RX ADMIN — DULOXETINE HYDROCHLORIDE 60 MILLIGRAM(S): 30 CAPSULE, DELAYED RELEASE ORAL at 10:51

## 2021-09-04 RX ADMIN — Medication 5 MILLIGRAM(S): at 21:42

## 2021-09-04 RX ADMIN — AMLODIPINE BESYLATE 5 MILLIGRAM(S): 2.5 TABLET ORAL at 10:53

## 2021-09-04 RX ADMIN — CEFTRIAXONE 2000 MILLIGRAM(S): 500 INJECTION, POWDER, FOR SOLUTION INTRAMUSCULAR; INTRAVENOUS at 10:49

## 2021-09-04 RX ADMIN — Medication 5 MILLIGRAM(S): at 00:13

## 2021-09-04 RX ADMIN — Medication 3 UNIT(S): at 08:15

## 2021-09-04 RX ADMIN — Medication 3 UNIT(S): at 17:46

## 2021-09-04 NOTE — PROGRESS NOTE ADULT - ASSESSMENT
70 y/o male with h/o HTN, HLD, skin CA was admitted on 8/25 for worsening shortness of breath, cough, body aches and pains, fever, chills. Pt states symptoms have gotten significantly worse over past two days PTA. In ER, he arrived with oxygen saturation at 86% on RA. Improved to mid 90's on 4L NC. Denies fever or chills at home.     1. COVID-19 viral syndrome. Multifocal pneumonia. Possible superimposed bacterial pneumonia with E.coli. Bacteremia with CNST ?contaminant. Acute respiratory failure. Left leg DVT. Allergy to PCN.   -low grade fever  -leukocytosis ?related to steroids  -respiratory frail, but improving  -on remdesivir protocol # 10  -tolerating abx well so far; no side effects noted  -repeat BC x 2 are negative to date  -on ceftriaxone 2 gm IV qd # 7  -monitor closely in kelly of PCN allergy history  -O2 therapy  -taper steroids  -on full AC  -droplet isolation  -respiratory care  -continue antiviral and abx therapy  -monitor temps  -f/u CBC  -supportive care  2. Other issues:   -care per medicine    d/w Dr. Cornell

## 2021-09-04 NOTE — PROGRESS NOTE ADULT - SUBJECTIVE AND OBJECTIVE BOX
CC: COVID     HPI:  Pt is a 70 yo male with a pmh/o HTN, HLD, skin CA who is a smoker and who quit smoking two weeks ago due to malaise, who presents to ED today due to worsening shortness of breath, cough, body aches and pains, fever, chills. Pt states symptoms have gotten significantly worse over past two days. Pt is not COVID vaccinated. Arrived with oxygen saturation at 86% on RA. Improved to mid 90's on 4L NC. Denies cp, palpitations, n/v/d, abd pain, constipation, rash, leg swelling, calf pain, dysuria, hematuria, sputum production, hemoptysis, HA.        Medical progress: on 4 L of O2at rest. Speak full sentances. Denies any HA, CP, SOB. No fevers, chills or shakes.   Complaints: weakness  State of mind: normal full sentence conversation  Care discussed with Dr. Oshea    REVIEW OF SYSTEMS:  All other review of systems is negative unless indicated above.    PHYSICAL EXAM:  General: Well developed: looks better,  NAD on HFNC  Eyes: EOMI; conjunctiva and sclera clear  Head: Normocephalic; atraumatic  ENMT: No nasal discharge; airway clear  Neck: Supple; non tender; no masses  Respiratory: Better air entry b.l,  No wheezes, rales or rhonchi  Cardiovascular: Regular rate and rhythm. S1 and S2 Normal;   Gastrointestinal: Soft , non  tender, non distended, + bowel sounds  Genitourinary: No  suprapubic  tenderness  Extremities: No LE  edema  Vascular: Peripheral pulses palpable 2+ bilaterally  Neurological: Alert and oriented x3, non focal   Musculoskeletal: Normal muscle tone and strength   Psychiatric: Cooperative        LABS:      CBC Full  -  ( 04 Sep 2021 07:41 )  WBC Count : 24.97 K/uL  RBC Count : 4.14 M/uL  Hemoglobin : 13.5 g/dL  Hematocrit : 39.2 %  Platelet Count - Automated : 399 K/uL  Mean Cell Volume : 94.7 fl  Mean Cell Hemoglobin : 32.6 pg  Mean Cell Hemoglobin Concentration : 34.4 gm/dL  Auto Neutrophil # : x  Auto Lymphocyte # : x  Auto Monocyte # : x  Auto Eosinophil # : x  Auto Basophil # : x  Auto Neutrophil % : x  Auto Lymphocyte % : x  Auto Monocyte % : x  Auto Eosinophil % : x  Auto Basophil % : x    PT/INR - ( 04 Sep 2021 07:41 )   PT: 11.9 sec;   INR: 1.02 ratio             09-04    131<L>  |  100  |  30<H>  ----------------------------<  180<H>  4.6   |  27  |  0.85    Ca    8.4<L>      04 Sep 2021 07:41    TPro  6.3  /  Alb  2.4<L>  /  TBili  0.4  /  DBili  0.1  /  AST  36  /  ALT  113<H>  /  AlkPhos  168<H>  09-04           Pt is a 70 yo male with a pmh/o HTN, HLD, skin CA admitted for:       # Acute hypoxic respiratory Failure  secondary to COVID 19 PNA,  superimposed E.COLI PNA, also suspected underline mass and Mediastinal LAD  - CTA chest: neg for PE, extensive pulm infiltrates, with RLL nodularity and mediastinal mass, possible Lung  malignancy   - on NC 4 L  - continuous pulse ox  - HFA albuterol Q6 hour PRN   - Dexamethasone /  Remdesivir Day # 10  - C/w Ceftriaxone IV day 6  - trend proinflammatory markers   - will need f/u CT to follow up on infiltrates and possible mass  in 4-6 weeks     # Hyperglycemia  - HB A1c 6.3, pre diabetes   - BS elevated 2/2 dexamethasone  - Monitor BS, cover with ISS, on low dose Lantus while on steroids     # Hypokalemia  -repleted   - monitor     # HTN  - monitor BP, stable  - on amlodipine     # Constipation  - CT abd neg for obstruction  - bowel regiment    #  Coagulase negative Staph Bacteremia.    - BCX x 1 set:  + Coagulase neg staph, likely contamination   - repeat  Cx: NGTD  - C/w IV  Rocephin  for sputum ECOLI coverage     # Acute LLE below the knee DVT  - as pt is high risk and cant r/o PE ( Pt is on HFNC) c/w  Full dose A/c  - Lovenox 90mg SQ BID    #Skin CA  - Pt may use own 5FU cream topically as directed    # Constipation  - Bowel regimen   - had 2 BMs

## 2021-09-04 NOTE — PROGRESS NOTE ADULT - SUBJECTIVE AND OBJECTIVE BOX
Date of service: 09-04-21 @ 10:32    Lying in bed in NAD  No SOB at rest  Has dry cough  No fever    ROS: no fever or chills; denies dizziness, no HA, no abdominal pain, no diarrhea or constipation; no dysuria, no legs pain, no rashes    MEDICATIONS  (STANDING):  amLODIPine   Tablet 5 milliGRAM(s) Oral daily  cefTRIAXone Injectable. 2000 milliGRAM(s) IV Push every 24 hours  dexAMETHasone     Tablet 4 milliGRAM(s) Oral daily  dextrose 40% Gel 15 Gram(s) Oral once  dextrose 5%. 1000 milliLiter(s) (50 mL/Hr) IV Continuous <Continuous>  dextrose 5%. 1000 milliLiter(s) (100 mL/Hr) IV Continuous <Continuous>  dextrose 50% Injectable 25 Gram(s) IV Push once  dextrose 50% Injectable 12.5 Gram(s) IV Push once  dextrose 50% Injectable 25 Gram(s) IV Push once  DULoxetine 60 milliGRAM(s) Oral daily  enoxaparin Injectable 90 milliGRAM(s) SubCutaneous two times a day  glucagon  Injectable 1 milliGRAM(s) IntraMuscular once  insulin glargine Injectable (LANTUS) 4 Unit(s) SubCutaneous at bedtime  insulin lispro (ADMELOG) corrective regimen sliding scale   SubCutaneous three times a day before meals  insulin lispro (ADMELOG) corrective regimen sliding scale   SubCutaneous at bedtime  insulin lispro Injectable (ADMELOG) 3 Unit(s) SubCutaneous three times a day before meals  melatonin 5 milliGRAM(s) Oral at bedtime  polyethylene glycol 3350 17 Gram(s) Oral daily    Vital Signs Last 24 Hrs  T(C): 36.5 (04 Sep 2021 07:14), Max: 36.8 (04 Sep 2021 05:51)  T(F): 97.7 (04 Sep 2021 07:14), Max: 98.2 (04 Sep 2021 05:51)  HR: 73 (04 Sep 2021 07:14) (67 - 81)  BP: 134/69 (04 Sep 2021 07:14) (126/68 - 134/69)  BP(mean): --  RR: 16 (04 Sep 2021 07:14) (16 - 16)  SpO2: 94% (04 Sep 2021 07:14) (94% - 99%)     Physical exam:    Constitutional:  No acute distress  HEENT: NC/AT, EOMI, PERRLA, conjunctivae clear  Neck: supple; thyroid not palpable  Back: no tenderness  Respiratory: respiratory effort normal; crackles at bases  Cardiovascular: S1S2 regular, no murmurs  Abdomen: soft, not tender, not distended, positive BS  Genitourinary: no suprapubic tenderness  Lymphatic: no LN palpable  Musculoskeletal: no muscle tenderness, no joint swelling or tenderness  Extremities: no pedal edema  Neurological/ Psychiatric: AxOx3, moving all extremities  Skin: no rashes; no palpable lesions    Labs: reviewed                        13.5   24.97 )-----------( 399      ( 04 Sep 2021 07:41 )             39.2     09-04    131<L>  |  100  |  30<H>  ----------------------------<  180<H>  4.6   |  27  |  0.85    Ca    8.4<L>      04 Sep 2021 07:41    TPro  6.3  /  Alb  2.4<L>  /  TBili  0.4  /  DBili  0.1  /  AST  36  /  ALT  113<H>  /  AlkPhos  168<H>  09-04    Ferritin, Serum: 2309 ng/mL (09-01-21 @ 08:04)  C-Reactive Protein, Serum: 70 mg/L (09-01-21 @ 08:04)  D-Dimer Assay, Quantitative: 1927 ng/mL DDU (09-01-21 @ 08:04)  D-Dimer Assay, Quantitative: 9326 ng/mL DDU (08-29-21 @ 08:12)  D-Dimer Assay, Quantitative: 5563 ng/mL DDU (08-28-21 @ 08:21)  C-Reactive Protein, Serum: 224 mg/L (08-25-21 @ 20:49)  D-Dimer Assay, Quantitative: 1819 ng/mL DDU (08-25-21 @ 20:49)  Ferritin, Serum: 1675 ng/mL (08-25-21 @ 20:49)                        12.5   22.44 )-----------( 298      ( 27 Aug 2021 08:53 )             35.3     08-27    138  |  106  |  19  ----------------------------<  143<H>  3.9   |  24  |  0.72    Ca    8.4<L>      27 Aug 2021 08:53    TPro  6.1  /  Alb  2.1<L>  /  TBili  0.8  /  DBili  x   /  AST  57<H>  /  ALT  78  /  AlkPhos  133<H>  08-26        C-Reactive Protein, Serum: 224 mg/L (08-25-21 @ 20:49)  D-Dimer Assay, Quantitative: 1819 ng/mL DDU (08-25-21 @ 20:49)  Ferritin, Serum: 1675 ng/mL (08-25-21 @ 20:49)                        12.6   8.64  )-----------( 230      ( 26 Aug 2021 08:43 )             36.2     08-26    Culture - Sputum (collected 27 Aug 2021 05:21)  Source: .Sputum Sputum  Gram Stain (28 Aug 2021 13:37):    Few polymorphonuclear leukocytes per low power field    Few Squamous epithelial cells per low power field    Moderate Gram Positive Cocci in Clusters per oil power field    Few Gram Negative Rods per oil power field    Few Gram Positive Rods per oil power field    Few Gram Negative Diplococci per oil power field  Preliminary Report (30 Aug 2021 09:39):    Moderate Escherichia coli    Normal Respiratory Celine present  Organism: Escherichia coli (30 Aug 2021 09:38)  Organism: Escherichia coli (30 Aug 2021 09:38)      -  Amikacin: S <=16      -  Amoxicillin/Clavulanic Acid: S <=8/4      -  Ampicillin: S <=8 These ampicillin results predict results for amoxicillin      -  Ampicillin/Sulbactam: S <=4/2 Enterobacter, Citrobacter, and Serratia may develop resistance during prolonged therapy (3-4 days)      -  Aztreonam: S <=4      -  Cefazolin: S <=2 Enterobacter, Citrobacter, and Serratia may develop resistance during prolonged therapy (3-4 days)      -  Cefepime: S <=2      -  Cefoxitin: S <=8      -  Ceftriaxone: S <=1 Enterobacter, Citrobacter, and Serratia may develop resistance during prolonged therapy      -  Ciprofloxacin: S <=0.25      -  Ertapenem: S <=0.5      -  Gentamicin: S <=2      -  Imipenem: S <=1      -  Levofloxacin: S <=0.5      -  Meropenem: S <=1      -  Piperacillin/Tazobactam: S <=8      -  Tobramycin: S <=2      -  Trimethoprim/Sulfamethoxazole: S <=0.5/9.5      Method Type: CRISTINA    Culture - Blood (collected 25 Aug 2021 20:49)  Source: .Blood None  Gram Stain (26 Aug 2021 19:21):    Growth in aerobic bottle: Gram Positive Cocci in Clusters    Growth in anaerobic bottle: Gram Positive Cocci in Clusters  Final Report (27 Aug 2021 17:40):    Growth in aerobic and anaerobic bottles: Staphylococcus hominis    Coag Negative Staphylococcus    Single set isolate, possible contaminant. Contact    Microbiology if susceptibility testing clinically    indicated.  Organism: Blood Culture PCR (27 Aug 2021 17:40)      -  Coagulase negative Staphylococcus: Detec      Method Type: PCR    BC: No growth to date. (08.29.21 @ 14:13)     137  |  108  |  19  ----------------------------<  269<H>  3.6   |  22  |  0.89    Ca    8.3<L>      26 Aug 2021 08:43    TPro  6.1  /  Alb  2.1<L>  /  TBili  0.8  /  DBili  x   /  AST  57<H>  /  ALT  78  /  AlkPhos  133<H>  08-26     LIVER FUNCTIONS - ( 26 Aug 2021 08:43 )  Alb: 2.1 g/dL / Pro: 6.1 gm/dL / ALK PHOS: 133 U/L / ALT: 78 U/L / AST: 57 U/L / GGT: x           COVID (08-25 @ 20:49)  Detected        Radiology: all available radiological tests reviewed    < from: CT Angio Chest PE Protocol w/ IV Cont (08.25.21 @ 22:53) >  No pulmonary embolism.  Severe patchy and partially confluent groundglass opacity in both lungs with patchy areas of more dense airspace disease is compatible with with COVID-19 pneumonia.  Small pleural effusions bilaterally.  A 2.5 x 2 cm right lower lobe nodular opacity appears more nodular and solid.  Numerous mediastinal and hilar lymph nodes measure up to 1.5 cm short axis in subcarina region and 1.4 cm short axis in the left hilum.  There is confluent soft tissue in the right hilum, surrounding the right upper lobe bronchus without associated airway narrowing.Underlying malignancy/lung cancer is not excluded.  Repeat chest CT scan is recommended in approximately one month after therapy and resolution of COVID-19 pneumonia two reassess the lung findings.  < end of copied text >    < from: US Duplex Venous Lower Ext Complete, Bilateral (08.29.21 @ 08:51) >  Below-the-knee left deep veinthrombosis involving the gastrocnemius and posterior tibial veins.  < end of copied text >      Advanced directives addressed: full resuscitation

## 2021-09-05 LAB
ALBUMIN SERPL ELPH-MCNC: 2.5 G/DL — LOW (ref 3.3–5)
ALP SERPL-CCNC: 146 U/L — HIGH (ref 40–120)
ALT FLD-CCNC: 115 U/L — HIGH (ref 12–78)
AST SERPL-CCNC: 40 U/L — HIGH (ref 15–37)
BILIRUB DIRECT SERPL-MCNC: 0.1 MG/DL — SIGNIFICANT CHANGE UP (ref 0–0.2)
BILIRUB INDIRECT FLD-MCNC: 0.4 MG/DL — SIGNIFICANT CHANGE UP (ref 0.2–1)
BILIRUB SERPL-MCNC: 0.5 MG/DL — SIGNIFICANT CHANGE UP (ref 0.2–1.2)
CREAT SERPL-MCNC: 0.77 MG/DL — SIGNIFICANT CHANGE UP (ref 0.5–1.3)
INR BLD: 1.05 RATIO — SIGNIFICANT CHANGE UP (ref 0.88–1.16)
PROT SERPL-MCNC: 6.3 GM/DL — SIGNIFICANT CHANGE UP (ref 6–8.3)
PROTHROM AB SERPL-ACNC: 12.2 SEC — SIGNIFICANT CHANGE UP (ref 10.6–13.6)

## 2021-09-05 PROCEDURE — 99232 SBSQ HOSP IP/OBS MODERATE 35: CPT

## 2021-09-05 PROCEDURE — 99233 SBSQ HOSP IP/OBS HIGH 50: CPT

## 2021-09-05 RX ORDER — ZOLPIDEM TARTRATE 10 MG/1
5 TABLET ORAL AT BEDTIME
Refills: 0 | Status: DISCONTINUED | OUTPATIENT
Start: 2021-09-05 | End: 2021-09-10

## 2021-09-05 RX ORDER — SODIUM CHLORIDE 0.65 %
1 AEROSOL, SPRAY (ML) NASAL
Refills: 0 | Status: DISCONTINUED | OUTPATIENT
Start: 2021-09-05 | End: 2021-09-10

## 2021-09-05 RX ADMIN — Medication 3 UNIT(S): at 16:47

## 2021-09-05 RX ADMIN — SIMETHICONE 80 MILLIGRAM(S): 80 TABLET, CHEWABLE ORAL at 16:47

## 2021-09-05 RX ADMIN — Medication 3 UNIT(S): at 08:09

## 2021-09-05 RX ADMIN — Medication 6: at 16:47

## 2021-09-05 RX ADMIN — ENOXAPARIN SODIUM 90 MILLIGRAM(S): 100 INJECTION SUBCUTANEOUS at 10:27

## 2021-09-05 RX ADMIN — Medication 2: at 08:09

## 2021-09-05 RX ADMIN — Medication 5 MILLIGRAM(S): at 21:44

## 2021-09-05 RX ADMIN — AMLODIPINE BESYLATE 5 MILLIGRAM(S): 2.5 TABLET ORAL at 10:27

## 2021-09-05 RX ADMIN — ENOXAPARIN SODIUM 90 MILLIGRAM(S): 100 INJECTION SUBCUTANEOUS at 21:44

## 2021-09-05 RX ADMIN — Medication 3 UNIT(S): at 13:12

## 2021-09-05 RX ADMIN — INSULIN GLARGINE 4 UNIT(S): 100 INJECTION, SOLUTION SUBCUTANEOUS at 21:44

## 2021-09-05 RX ADMIN — Medication 4 MILLIGRAM(S): at 10:27

## 2021-09-05 RX ADMIN — ZOLPIDEM TARTRATE 5 MILLIGRAM(S): 10 TABLET ORAL at 21:44

## 2021-09-05 RX ADMIN — DULOXETINE HYDROCHLORIDE 60 MILLIGRAM(S): 30 CAPSULE, DELAYED RELEASE ORAL at 10:27

## 2021-09-05 RX ADMIN — Medication 2: at 21:45

## 2021-09-05 NOTE — PROGRESS NOTE ADULT - ASSESSMENT
72 y/o male with h/o HTN, HLD, skin CA was admitted on 8/25 for worsening shortness of breath, cough, body aches and pains, fever, chills. Pt states symptoms have gotten significantly worse over past two days PTA. In ER, he arrived with oxygen saturation at 86% on RA. Improved to mid 90's on 4L NC. Denies fever or chills at home.     1. COVID-19 viral syndrome. Multifocal pneumonia. Possible superimposed bacterial pneumonia with E.coli. Bacteremia with CNST ?contaminant. Acute respiratory failure. Left leg DVT. Allergy to PCN.   -low grade fever resolving  -leukocytosis ?related to steroids  -respiratory frail, but improving  -s/p remdesivir protocol # 10  -tolerating abx well so far; no side effects noted  -repeat BC x 2 are negative to date  -on ceftriaxone 2 gm IV qd # 8  -monitor closely in kelly of PCN allergy history  -complete abx therapy today  -O2 therapy  -continue to taper steroids  -on full AC  -droplet isolation  -respiratory care  -monitor temps  -f/u CBC  -supportive care  2. Other issues:   -care per medicine    d/w Dr. Cornell

## 2021-09-05 NOTE — PROGRESS NOTE ADULT - SUBJECTIVE AND OBJECTIVE BOX
Subjective:  feeling better  on nasal canula    MEDICATIONS  (STANDING):  amLODIPine   Tablet 5 milliGRAM(s) Oral daily  dexAMETHasone     Tablet 4 milliGRAM(s) Oral daily  dextrose 40% Gel 15 Gram(s) Oral once  dextrose 5%. 1000 milliLiter(s) (50 mL/Hr) IV Continuous <Continuous>  dextrose 5%. 1000 milliLiter(s) (100 mL/Hr) IV Continuous <Continuous>  dextrose 50% Injectable 25 Gram(s) IV Push once  dextrose 50% Injectable 12.5 Gram(s) IV Push once  dextrose 50% Injectable 25 Gram(s) IV Push once  DULoxetine 60 milliGRAM(s) Oral daily  enoxaparin Injectable 90 milliGRAM(s) SubCutaneous two times a day  glucagon  Injectable 1 milliGRAM(s) IntraMuscular once  insulin glargine Injectable (LANTUS) 4 Unit(s) SubCutaneous at bedtime  insulin lispro (ADMELOG) corrective regimen sliding scale   SubCutaneous three times a day before meals  insulin lispro (ADMELOG) corrective regimen sliding scale   SubCutaneous at bedtime  insulin lispro Injectable (ADMELOG) 3 Unit(s) SubCutaneous three times a day before meals  melatonin 5 milliGRAM(s) Oral at bedtime  polyethylene glycol 3350 17 Gram(s) Oral daily    MEDICATIONS  (PRN):  acetaminophen   Tablet .. 650 milliGRAM(s) Oral every 4 hours PRN Temp greater or equal to 38C (100.4F), Mild Pain (1 - 3)  ALBUTerol    90 MICROgram(s) HFA Inhaler 2 Puff(s) Inhalation every 4 hours PRN Shortness of Breath and/or Wheezing  benzonatate 100 milliGRAM(s) Oral three times a day PRN Cough  FIRST- Mouthwash  BLM 15 milliLiter(s) Swish and Spit four times a day PRN Mouth Care  guaifenesin/dextromethorphan Oral Liquid 10 milliLiter(s) Oral every 4 hours PRN Cough  ondansetron Injectable 4 milliGRAM(s) IV Push every 8 hours PRN Nausea and/or Vomiting  senna 2 Tablet(s) Oral at bedtime PRN Constipation  simethicone 80 milliGRAM(s) Chew four times a day PRN Gas      Allergies    penicillin (Unknown)    Intolerances        REVIEW OF SYSTEMS:    CONSTITUTIONAL:  As per HPI.  HEENT:  Eyes:  No diplopia or blurred vision. ENT:  No earache, sore throat or runny nose.  CARDIOVASCULAR:  No pressure, squeezing, tightness, heaviness or aching about the chest, neck, axilla or epigastrium.  RESPIRATORY:  No cough, shortness of breath, PND or orthopnea.  GASTROINTESTINAL:  No nausea, vomiting or diarrhea.  GENITOURINARY:  No dysuria, frequency or urgency.  MUSCULOSKELETAL:  no joint pain, deformity, tenderness  EXTREMITIES: no clubbing cyanosis,edema  SKIN:  No change in skin, hair or nails.  NEUROLOGIC:  No paresthesias, fasciculations, seizures or weakness.  PSYCHIATRIC:  No disorder of thought or mood.  ENDOCRINE:  No heat or cold intolerance, polyuria or polydipsia.  HEMATOLOGICAL:  No easy bruising or bleedings:    Vital Signs Last 24 Hrs  T(C): 36.4 (05 Sep 2021 09:00), Max: 36.8 (04 Sep 2021 21:10)  T(F): 97.5 (05 Sep 2021 09:00), Max: 98.3 (04 Sep 2021 21:10)  HR: 86 (05 Sep 2021 09:00) (68 - 86)  BP: 116/61 (05 Sep 2021 09:00) (116/61 - 130/65)  BP(mean): --  RR: 18 (05 Sep 2021 09:05) (16 - 18)  SpO2: 96% (05 Sep 2021 09:05) (88% - 98%)    PHYSICAL EXAMINATION:  SKIN: no rashes  HEAD: NC/AT  EYES: PERRLA, EOMI  EARS: TM's intact  NOSE: no abnormalities  NECK:  Supple. No lymphadenopathy. Jugular venous pressure not elevated. Carotids equal.   HEART:   The cardiac impulse has a normal quality. Reg., Nl S1 and S2.  There are no murmurs, rubs or gallops noted  CHEST: bilat crackles  ABDOMEN:  Soft and nontender.   EXTREMITIES:  no C/C/E  NEURO: AAO x 3, no focal deficts       LABS:                        13.5   24.97 )-----------( 399      ( 04 Sep 2021 07:41 )             39.2     09-05    x   |  x   |  x   ----------------------------<  x   x    |  x   |  0.77    Ca    8.4<L>      04 Sep 2021 07:41    TPro  6.3  /  Alb  2.5<L>  /  TBili  0.5  /  DBili  0.1  /  AST  40<H>  /  ALT  115<H>  /  AlkPhos  146<H>  09-05    PT/INR - ( 05 Sep 2021 06:33 )   PT: 12.2 sec;   INR: 1.05 ratio               RADIOLOGY & ADDITIONAL TESTS:

## 2021-09-05 NOTE — PROGRESS NOTE ADULT - ASSESSMENT
71 year-old man, active smoker, with COVID pneumonia and ARDS.  --Additionally has noted by radiology to have nodular opacity in RLL and mediastinal adenopathy   --Also found to have LE DVT  --c/w nasal cannula, wean as tolerated.    --Can increase flow or change to NRB for exertion/ambulation to the bathroom if needed.   --off remdesivir; remains on decadron  --On full dose lovenox  --off abx   --Abnormal CT possible underlying malignancy, will need follow up after discharge.

## 2021-09-05 NOTE — PROGRESS NOTE ADULT - SUBJECTIVE AND OBJECTIVE BOX
CC: COVID     HPI:  Pt is a 70 yo male with a pmh/o HTN, HLD, skin CA who is a smoker and who quit smoking two weeks ago due to malaise, who presents to ED today due to worsening shortness of breath, cough, body aches and pains, fever, chills. Pt states symptoms have gotten significantly worse over past two days. Pt is not COVID vaccinated. Arrived with oxygen saturation at 86% on RA. Improved to mid 90's on 4L NC. Denies cp, palpitations, n/v/d, abd pain, constipation, rash, leg swelling, calf pain, dysuria, hematuria, sputum production, hemoptysis, HA.        Medical progress: on 4 L of O2at rest. Speak full sentances. Denies any HA, CP, SOB. No fevers, chills or shakes.   Complaints: weakness /  needs an ambien at night  State of mind: normal full sentence conversation  Care discussed with Dr. Oshea    REVIEW OF SYSTEMS:  All other review of systems is negative unless indicated above.    PHYSICAL EXAM:  T(C): 36.4 (05 Sep 2021 09:00), Max: 36.8 (04 Sep 2021 21:10)  T(F): 97.5 (05 Sep 2021 09:00), Max: 98.3 (04 Sep 2021 21:10)  HR: 86 (05 Sep 2021 09:00) (68 - 86)  BP: 116/61 (05 Sep 2021 09:00) (116/61 - 130/65)  RR: 18 (05 Sep 2021 09:05) (16 - 18)  SpO2: 96% (05 Sep 2021 09:05) (88% - 98%)  General: Well developed: looks better,  NAD on HFNC  Eyes: EOMI; conjunctiva and sclera clear  Head: Normocephalic; atraumatic  ENMT: No nasal discharge; airway clear  Neck: Supple; non tender; no masses  Respiratory: Better air entry b.l,  No wheezes, rales or rhonchi  Cardiovascular: Regular rate and rhythm. S1 and S2 Normal;   Gastrointestinal: Soft , non  tender, non distended, + bowel sounds  Genitourinary: No  suprapubic  tenderness  Extremities: No LE  edema  Vascular: Peripheral pulses palpable 2+ bilaterally  Neurological: Alert and oriented x3, non focal   Musculoskeletal: Normal muscle tone and strength   Psychiatric: Cooperative    LABS:        CBC Full  -  ( 04 Sep 2021 07:41 )  WBC Count : 24.97 K/uL  RBC Count : 4.14 M/uL  Hemoglobin : 13.5 g/dL  Hematocrit : 39.2 %  Platelet Count - Automated : 399 K/uL  Mean Cell Volume : 94.7 fl  Mean Cell Hemoglobin : 32.6 pg  Mean Cell Hemoglobin Concentration : 34.4 gm/dL  Auto Neutrophil # : x  Auto Lymphocyte # : x  Auto Monocyte # : x  Auto Eosinophil # : x  Auto Basophil # : x  Auto Neutrophil % : x  Auto Lymphocyte % : x  Auto Monocyte % : x  Auto Eosinophil % : x  Auto Basophil % : x    PT/INR - ( 05 Sep 2021 06:33 )   PT: 12.2 sec;   INR: 1.05 ratio             09-05    x   |  x   |  x   ----------------------------<  x   x    |  x   |  0.77    Ca    8.4<L>      04 Sep 2021 07:41    TPro  6.3  /  Alb  2.5<L>  /  TBili  0.5  /  DBili  0.1  /  AST  40<H>  /  ALT  115<H>  /  AlkPhos  146<H>  09-05            Pt is a 70 yo male with a pmh/o HTN, HLD, skin CA admitted for:       # Acute hypoxic respiratory Failure  secondary to COVID 19 PNA,  superimposed E.COLI PNA, also suspected underline mass and Mediastinal LAD  - CTA chest: neg for PE, extensive pulm infiltrates, with RLL nodularity and mediastinal mass, possible Lung  malignancy   - on NC 4 L  - continuous pulse ox  - HFA albuterol Q6 hour PRN   - Dexamethasone /  Remdesivir Day # 10  - d/c rocephin  - trend proinflammatory markers   - will need f/u CT to follow up on infiltrates and possible mass  in 4-6 weeks   - Discussed with ID / Pulmonary    # Hyperglycemia  - HB A1c 6.3, pre diabetes   - BS elevated 2/2 dexamethasone  - Monitor BS, cover with ISS, on low dose Lantus while on steroids     # Hypokalemia  -repleted   - monitor     # HTN  - monitor BP, stable  - on amlodipine     # Constipation  - CT abd neg for obstruction  - bowel regiment    #  Coagulase negative Staph Bacteremia.    - BCX x 1 set:  + Coagulase neg staph, likely contamination   - repeat  Cx: NGTD  - C/w IV  Rocephin  for sputum ECOLI coverage     # Acute LLE below the knee DVT  - as pt is high risk and cant r/o PE ( Pt is on HFNC) c/w  Full dose A/c  - Lovenox 90mg SQ BID    #Skin CA  - Pt may use own 5FU cream topically as directed    # Constipation  - Bowel regimen   - had 2 BMs       CC: COVID     HPI:  Pt is a 72 yo male with a pmh/o HTN, HLD, skin CA who is a smoker and who quit smoking two weeks ago due to malaise, who presents to ED today due to worsening shortness of breath, cough, body aches and pains, fever, chills. Pt states symptoms have gotten significantly worse over past two days. Pt is not COVID vaccinated. Arrived with oxygen saturation at 86% on RA. Improved to mid 90's on 4L NC. Denies cp, palpitations, n/v/d, abd pain, constipation, rash, leg swelling, calf pain, dysuria, hematuria, sputum production, hemoptysis, HA.        Medical progress: on 4 L of O2at rest. Speak full sentances. Denies any HA, CP, SOB. No fevers, chills or shakes.   Complaints: weakness /  needs an ambien at night  State of mind: normal full sentence conversation  Care discussed with Dr. Oshea    REVIEW OF SYSTEMS:  All other review of systems is negative unless indicated above.    PHYSICAL EXAM:  T(C): 36.4 (05 Sep 2021 09:00), Max: 36.8 (04 Sep 2021 21:10)  T(F): 97.5 (05 Sep 2021 09:00), Max: 98.3 (04 Sep 2021 21:10)  HR: 86 (05 Sep 2021 09:00) (68 - 86)  BP: 116/61 (05 Sep 2021 09:00) (116/61 - 130/65)  RR: 18 (05 Sep 2021 09:05) (16 - 18)  SpO2: 96% (05 Sep 2021 09:05) (88% - 98%)  General: Well developed: looks better,  NAD on HFNC  Eyes: EOMI; conjunctiva and sclera clear  Head: Normocephalic; atraumatic  ENMT: No nasal discharge; airway clear  Neck: Supple; non tender; no masses  Respiratory: Better air entry b.l,  No wheezes, rales or rhonchi  Cardiovascular: Regular rate and rhythm. S1 and S2 Normal;   Gastrointestinal: Soft , non  tender, non distended, + bowel sounds  Genitourinary: No  suprapubic  tenderness  Extremities: No LE  edema  Vascular: Peripheral pulses palpable 2+ bilaterally  Neurological: Alert and oriented x3, non focal   Musculoskeletal: Normal muscle tone and strength   Psychiatric: Cooperative    LABS:        CBC Full  -  ( 04 Sep 2021 07:41 )  WBC Count : 24.97 K/uL  RBC Count : 4.14 M/uL  Hemoglobin : 13.5 g/dL  Hematocrit : 39.2 %  Platelet Count - Automated : 399 K/uL  Mean Cell Volume : 94.7 fl  Mean Cell Hemoglobin : 32.6 pg  Mean Cell Hemoglobin Concentration : 34.4 gm/dL  Auto Neutrophil # : x  Auto Lymphocyte # : x  Auto Monocyte # : x  Auto Eosinophil # : x  Auto Basophil # : x  Auto Neutrophil % : x  Auto Lymphocyte % : x  Auto Monocyte % : x  Auto Eosinophil % : x  Auto Basophil % : x    PT/INR - ( 05 Sep 2021 06:33 )   PT: 12.2 sec;   INR: 1.05 ratio             09-05    x   |  x   |  x   ----------------------------<  x   x    |  x   |  0.77    Ca    8.4<L>      04 Sep 2021 07:41    TPro  6.3  /  Alb  2.5<L>  /  TBili  0.5  /  DBili  0.1  /  AST  40<H>  /  ALT  115<H>  /  AlkPhos  146<H>  09-05            Pt is a 72 yo male with a pmh/o HTN, HLD, skin CA admitted for:       # Acute hypoxic respiratory Failure  secondary to COVID 19 PNA,  superimposed E.COLI PNA, also suspected underline mass and Mediastinal LAD  - CTA chest: neg for PE, extensive pulm infiltrates, with RLL nodularity and mediastinal mass, possible Lung  malignancy   - on NC 4 L  - continuous pulse ox  - HFA albuterol Q6 hour PRN   - Dexamethasone /  Remdesivir Day # 10  - d/c rocephin  - trend proinflammatory markers   - will need f/u CT to follow up on infiltrates and possible mass  in 4-6 weeks   - Discussed with ID / Pulmonary    # Hyperglycemia  - HB A1c 6.3, pre diabetes   - BS elevated 2/2 dexamethasone  - Monitor BS, cover with ISS, on low dose Lantus while on steroids     # Hypokalemia  -repleted   - monitor     # HTN  - monitor BP, stable  - on amlodipine     # Constipation  - CT abd neg for obstruction  - bowel regiment    #  Coagulase negative Staph Bacteremia.    - BCX x 1 set:  + Coagulase neg staph, likely contamination   - repeat  Cx: NGTD  - C/w IV  Rocephin  for sputum ECOLI coverage     # Acute LLE below the knee DVT  - as pt is high risk and cant r/o PE ( Pt is on HFNC) c/w  Full dose A/c  - Lovenox 90mg SQ BID     #Skin CA  - Pt may use own 5FU cream topically as directed    # Constipation  - Bowel regimen   - had 2 BMs

## 2021-09-05 NOTE — PROGRESS NOTE ADULT - SUBJECTIVE AND OBJECTIVE BOX
Date of service: 09-05-21 @ 09:56    Sitting in bed in NAD  No SOB at rest  Has dry cough  On O2 therapy    ROS: no fever or chills; denies dizziness, no HA, no abdominal pain, no diarrhea or constipation; no dysuria, no legs pain, no rashes    MEDICATIONS  (STANDING):  amLODIPine   Tablet 5 milliGRAM(s) Oral daily  cefTRIAXone Injectable. 2000 milliGRAM(s) IV Push every 24 hours  dexAMETHasone     Tablet 4 milliGRAM(s) Oral daily  dextrose 40% Gel 15 Gram(s) Oral once  dextrose 5%. 1000 milliLiter(s) (50 mL/Hr) IV Continuous <Continuous>  dextrose 5%. 1000 milliLiter(s) (100 mL/Hr) IV Continuous <Continuous>  dextrose 50% Injectable 25 Gram(s) IV Push once  dextrose 50% Injectable 12.5 Gram(s) IV Push once  dextrose 50% Injectable 25 Gram(s) IV Push once  DULoxetine 60 milliGRAM(s) Oral daily  enoxaparin Injectable 90 milliGRAM(s) SubCutaneous two times a day  glucagon  Injectable 1 milliGRAM(s) IntraMuscular once  insulin glargine Injectable (LANTUS) 4 Unit(s) SubCutaneous at bedtime  insulin lispro (ADMELOG) corrective regimen sliding scale   SubCutaneous three times a day before meals  insulin lispro (ADMELOG) corrective regimen sliding scale   SubCutaneous at bedtime  insulin lispro Injectable (ADMELOG) 3 Unit(s) SubCutaneous three times a day before meals  melatonin 5 milliGRAM(s) Oral at bedtime  polyethylene glycol 3350 17 Gram(s) Oral daily    Vital Signs Last 24 Hrs  T(C): 36.8 (04 Sep 2021 21:10), Max: 36.8 (04 Sep 2021 21:10)  T(F): 98.3 (04 Sep 2021 21:10), Max: 98.3 (04 Sep 2021 21:10)  HR: 75 (04 Sep 2021 21:10) (68 - 75)  BP: 129/73 (04 Sep 2021 21:10) (129/73 - 130/65)  BP(mean): --  RR: 17 (04 Sep 2021 21:10) (16 - 17)  SpO2: 98% (04 Sep 2021 21:10) (94% - 98%)     Physical exam:    Constitutional:  No acute distress  HEENT: NC/AT, EOMI, PERRLA, conjunctivae clear  Neck: supple; thyroid not palpable  Back: no tenderness  Respiratory: respiratory effort normal; crackles at bases  Cardiovascular: S1S2 regular, no murmurs  Abdomen: soft, not tender, not distended, positive BS  Genitourinary: no suprapubic tenderness  Lymphatic: no LN palpable  Musculoskeletal: no muscle tenderness, no joint swelling or tenderness  Extremities: no pedal edema  Neurological/ Psychiatric: AxOx3, moving all extremities  Skin: no rashes; no palpable lesions    Labs: reviewed                        13.5   24.97 )-----------( 399      ( 04 Sep 2021 07:41 )             39.2     09-05    x   |  x   |  x   ----------------------------<  x   x    |  x   |  0.77    Ca    8.4<L>      04 Sep 2021 07:41    TPro  6.3  /  Alb  2.5<L>  /  TBili  0.5  /  DBili  0.1  /  AST  40<H>  /  ALT  115<H>  /  AlkPhos  146<H>  09-05    Ferritin, Serum: 2309 ng/mL (09-01-21 @ 08:04)  C-Reactive Protein, Serum: 70 mg/L (09-01-21 @ 08:04)  D-Dimer Assay, Quantitative: 1927 ng/mL DDU (09-01-21 @ 08:04)  D-Dimer Assay, Quantitative: 9326 ng/mL DDU (08-29-21 @ 08:12)  D-Dimer Assay, Quantitative: 5563 ng/mL DDU (08-28-21 @ 08:21)  C-Reactive Protein, Serum: 224 mg/L (08-25-21 @ 20:49)  D-Dimer Assay, Quantitative: 1819 ng/mL DDU (08-25-21 @ 20:49)  Ferritin, Serum: 1675 ng/mL (08-25-21 @ 20:49)               12.5   22.44 )-----------( 298      ( 27 Aug 2021 08:53 )             35.3     08-27    138  |  106  |  19  ----------------------------<  143<H>  3.9   |  24  |  0.72    Ca    8.4<L>      27 Aug 2021 08:53    TPro  6.1  /  Alb  2.1<L>  /  TBili  0.8  /  DBili  x   /  AST  57<H>  /  ALT  78  /  AlkPhos  133<H>  08-26        C-Reactive Protein, Serum: 224 mg/L (08-25-21 @ 20:49)  D-Dimer Assay, Quantitative: 1819 ng/mL DDU (08-25-21 @ 20:49)  Ferritin, Serum: 1675 ng/mL (08-25-21 @ 20:49)                        12.6   8.64  )-----------( 230      ( 26 Aug 2021 08:43 )             36.2     08-26    Culture - Sputum (collected 27 Aug 2021 05:21)  Source: .Sputum Sputum  Gram Stain (28 Aug 2021 13:37):    Few polymorphonuclear leukocytes per low power field    Few Squamous epithelial cells per low power field    Moderate Gram Positive Cocci in Clusters per oil power field    Few Gram Negative Rods per oil power field    Few Gram Positive Rods per oil power field    Few Gram Negative Diplococci per oil power field  Preliminary Report (30 Aug 2021 09:39):    Moderate Escherichia coli    Normal Respiratory Celine present  Organism: Escherichia coli (30 Aug 2021 09:38)  Organism: Escherichia coli (30 Aug 2021 09:38)      -  Amikacin: S <=16      -  Amoxicillin/Clavulanic Acid: S <=8/4      -  Ampicillin: S <=8 These ampicillin results predict results for amoxicillin      -  Ampicillin/Sulbactam: S <=4/2 Enterobacter, Citrobacter, and Serratia may develop resistance during prolonged therapy (3-4 days)      -  Aztreonam: S <=4      -  Cefazolin: S <=2 Enterobacter, Citrobacter, and Serratia may develop resistance during prolonged therapy (3-4 days)      -  Cefepime: S <=2      -  Cefoxitin: S <=8      -  Ceftriaxone: S <=1 Enterobacter, Citrobacter, and Serratia may develop resistance during prolonged therapy      -  Ciprofloxacin: S <=0.25      -  Ertapenem: S <=0.5      -  Gentamicin: S <=2      -  Imipenem: S <=1      -  Levofloxacin: S <=0.5      -  Meropenem: S <=1      -  Piperacillin/Tazobactam: S <=8      -  Tobramycin: S <=2      -  Trimethoprim/Sulfamethoxazole: S <=0.5/9.5      Method Type: CRISTINA    Culture - Blood (collected 25 Aug 2021 20:49)  Source: .Blood None  Gram Stain (26 Aug 2021 19:21):    Growth in aerobic bottle: Gram Positive Cocci in Clusters    Growth in anaerobic bottle: Gram Positive Cocci in Clusters  Final Report (27 Aug 2021 17:40):    Growth in aerobic and anaerobic bottles: Staphylococcus hominis    Coag Negative Staphylococcus    Single set isolate, possible contaminant. Contact    Microbiology if susceptibility testing clinically    indicated.  Organism: Blood Culture PCR (27 Aug 2021 17:40)      -  Coagulase negative Staphylococcus: Detec      Method Type: PCR    BC: No growth to date. (08.29.21 @ 14:13)     137  |  108  |  19  ----------------------------<  269<H>  3.6   |  22  |  0.89    Ca    8.3<L>      26 Aug 2021 08:43    TPro  6.1  /  Alb  2.1<L>  /  TBili  0.8  /  DBili  x   /  AST  57<H>  /  ALT  78  /  AlkPhos  133<H>  08-26     LIVER FUNCTIONS - ( 26 Aug 2021 08:43 )  Alb: 2.1 g/dL / Pro: 6.1 gm/dL / ALK PHOS: 133 U/L / ALT: 78 U/L / AST: 57 U/L / GGT: x           COVID (08-25 @ 20:49)  Detected        Radiology: all available radiological tests reviewed    < from: CT Angio Chest PE Protocol w/ IV Cont (08.25.21 @ 22:53) >  No pulmonary embolism.  Severe patchy and partially confluent groundglass opacity in both lungs with patchy areas of more dense airspace disease is compatible with with COVID-19 pneumonia.  Small pleural effusions bilaterally.  A 2.5 x 2 cm right lower lobe nodular opacity appears more nodular and solid.  Numerous mediastinal and hilar lymph nodes measure up to 1.5 cm short axis in subcarina region and 1.4 cm short axis in the left hilum.  There is confluent soft tissue in the right hilum, surrounding the right upper lobe bronchus without associated airway narrowing.Underlying malignancy/lung cancer is not excluded.  Repeat chest CT scan is recommended in approximately one month after therapy and resolution of COVID-19 pneumonia two reassess the lung findings.  < end of copied text >    < from: US Duplex Venous Lower Ext Complete, Bilateral (08.29.21 @ 08:51) >  Below-the-knee left deep veinthrombosis involving the gastrocnemius and posterior tibial veins.  < end of copied text >      Advanced directives addressed: full resuscitation

## 2021-09-06 LAB
ANION GAP SERPL CALC-SCNC: 5 MMOL/L — SIGNIFICANT CHANGE UP (ref 5–17)
BUN SERPL-MCNC: 29 MG/DL — HIGH (ref 7–23)
CALCIUM SERPL-MCNC: 8.4 MG/DL — LOW (ref 8.5–10.1)
CHLORIDE SERPL-SCNC: 100 MMOL/L — SIGNIFICANT CHANGE UP (ref 96–108)
CO2 SERPL-SCNC: 26 MMOL/L — SIGNIFICANT CHANGE UP (ref 22–31)
CREAT SERPL-MCNC: 0.85 MG/DL — SIGNIFICANT CHANGE UP (ref 0.5–1.3)
GLUCOSE SERPL-MCNC: 157 MG/DL — HIGH (ref 70–99)
HCT VFR BLD CALC: 41 % — SIGNIFICANT CHANGE UP (ref 39–50)
HGB BLD-MCNC: 14.1 G/DL — SIGNIFICANT CHANGE UP (ref 13–17)
INR BLD: 1 RATIO — SIGNIFICANT CHANGE UP (ref 0.88–1.16)
MCHC RBC-ENTMCNC: 32.3 PG — SIGNIFICANT CHANGE UP (ref 27–34)
MCHC RBC-ENTMCNC: 34.4 GM/DL — SIGNIFICANT CHANGE UP (ref 32–36)
MCV RBC AUTO: 93.8 FL — SIGNIFICANT CHANGE UP (ref 80–100)
PLATELET # BLD AUTO: 401 K/UL — HIGH (ref 150–400)
POTASSIUM SERPL-MCNC: 4.8 MMOL/L — SIGNIFICANT CHANGE UP (ref 3.5–5.3)
POTASSIUM SERPL-SCNC: 4.8 MMOL/L — SIGNIFICANT CHANGE UP (ref 3.5–5.3)
PROTHROM AB SERPL-ACNC: 11.7 SEC — SIGNIFICANT CHANGE UP (ref 10.6–13.6)
RBC # BLD: 4.37 M/UL — SIGNIFICANT CHANGE UP (ref 4.2–5.8)
RBC # FLD: 12.9 % — SIGNIFICANT CHANGE UP (ref 10.3–14.5)
SODIUM SERPL-SCNC: 131 MMOL/L — LOW (ref 135–145)
WBC # BLD: 25.98 K/UL — HIGH (ref 3.8–10.5)
WBC # FLD AUTO: 25.98 K/UL — HIGH (ref 3.8–10.5)

## 2021-09-06 PROCEDURE — 99233 SBSQ HOSP IP/OBS HIGH 50: CPT

## 2021-09-06 RX ADMIN — AMLODIPINE BESYLATE 5 MILLIGRAM(S): 2.5 TABLET ORAL at 09:29

## 2021-09-06 RX ADMIN — ZOLPIDEM TARTRATE 5 MILLIGRAM(S): 10 TABLET ORAL at 22:41

## 2021-09-06 RX ADMIN — Medication 3 UNIT(S): at 09:28

## 2021-09-06 RX ADMIN — Medication 2: at 16:59

## 2021-09-06 RX ADMIN — Medication 2: at 09:28

## 2021-09-06 RX ADMIN — Medication 4 MILLIGRAM(S): at 09:43

## 2021-09-06 RX ADMIN — Medication 3 UNIT(S): at 16:59

## 2021-09-06 RX ADMIN — Medication 100 MILLIGRAM(S): at 09:36

## 2021-09-06 RX ADMIN — ENOXAPARIN SODIUM 90 MILLIGRAM(S): 100 INJECTION SUBCUTANEOUS at 09:30

## 2021-09-06 RX ADMIN — Medication 2: at 13:04

## 2021-09-06 RX ADMIN — INSULIN GLARGINE 4 UNIT(S): 100 INJECTION, SOLUTION SUBCUTANEOUS at 22:41

## 2021-09-06 RX ADMIN — Medication 30 MILLILITER(S): at 23:18

## 2021-09-06 RX ADMIN — ENOXAPARIN SODIUM 90 MILLIGRAM(S): 100 INJECTION SUBCUTANEOUS at 22:41

## 2021-09-06 RX ADMIN — Medication 3 UNIT(S): at 13:03

## 2021-09-06 RX ADMIN — Medication 5 MILLIGRAM(S): at 22:42

## 2021-09-06 RX ADMIN — DULOXETINE HYDROCHLORIDE 60 MILLIGRAM(S): 30 CAPSULE, DELAYED RELEASE ORAL at 09:29

## 2021-09-07 LAB
ANION GAP SERPL CALC-SCNC: 5 MMOL/L — SIGNIFICANT CHANGE UP (ref 5–17)
BUN SERPL-MCNC: 29 MG/DL — HIGH (ref 7–23)
CALCIUM SERPL-MCNC: 8.1 MG/DL — LOW (ref 8.5–10.1)
CHLORIDE SERPL-SCNC: 99 MMOL/L — SIGNIFICANT CHANGE UP (ref 96–108)
CO2 SERPL-SCNC: 27 MMOL/L — SIGNIFICANT CHANGE UP (ref 22–31)
CREAT SERPL-MCNC: 0.84 MG/DL — SIGNIFICANT CHANGE UP (ref 0.5–1.3)
GLUCOSE SERPL-MCNC: 112 MG/DL — HIGH (ref 70–99)
HCT VFR BLD CALC: 42.1 % — SIGNIFICANT CHANGE UP (ref 39–50)
HGB BLD-MCNC: 14.4 G/DL — SIGNIFICANT CHANGE UP (ref 13–17)
INR BLD: 0.95 RATIO — SIGNIFICANT CHANGE UP (ref 0.88–1.16)
MCHC RBC-ENTMCNC: 32.1 PG — SIGNIFICANT CHANGE UP (ref 27–34)
MCHC RBC-ENTMCNC: 34.2 GM/DL — SIGNIFICANT CHANGE UP (ref 32–36)
MCV RBC AUTO: 93.8 FL — SIGNIFICANT CHANGE UP (ref 80–100)
PLATELET # BLD AUTO: 377 K/UL — SIGNIFICANT CHANGE UP (ref 150–400)
POTASSIUM SERPL-MCNC: 4.7 MMOL/L — SIGNIFICANT CHANGE UP (ref 3.5–5.3)
POTASSIUM SERPL-SCNC: 4.7 MMOL/L — SIGNIFICANT CHANGE UP (ref 3.5–5.3)
PROTHROM AB SERPL-ACNC: 11.1 SEC — SIGNIFICANT CHANGE UP (ref 10.6–13.6)
RBC # BLD: 4.49 M/UL — SIGNIFICANT CHANGE UP (ref 4.2–5.8)
RBC # FLD: 12.9 % — SIGNIFICANT CHANGE UP (ref 10.3–14.5)
SODIUM SERPL-SCNC: 131 MMOL/L — LOW (ref 135–145)
WBC # BLD: 24.1 K/UL — HIGH (ref 3.8–10.5)
WBC # FLD AUTO: 24.1 K/UL — HIGH (ref 3.8–10.5)

## 2021-09-07 PROCEDURE — 99233 SBSQ HOSP IP/OBS HIGH 50: CPT

## 2021-09-07 RX ORDER — APIXABAN 2.5 MG/1
5 TABLET, FILM COATED ORAL EVERY 12 HOURS
Refills: 0 | Status: DISCONTINUED | OUTPATIENT
Start: 2021-09-07 | End: 2021-09-10

## 2021-09-07 RX ADMIN — Medication 3 UNIT(S): at 17:47

## 2021-09-07 RX ADMIN — AMLODIPINE BESYLATE 5 MILLIGRAM(S): 2.5 TABLET ORAL at 10:06

## 2021-09-07 RX ADMIN — ENOXAPARIN SODIUM 90 MILLIGRAM(S): 100 INJECTION SUBCUTANEOUS at 10:06

## 2021-09-07 RX ADMIN — APIXABAN 5 MILLIGRAM(S): 2.5 TABLET, FILM COATED ORAL at 22:25

## 2021-09-07 RX ADMIN — Medication 4 MILLIGRAM(S): at 10:06

## 2021-09-07 RX ADMIN — Medication 3 UNIT(S): at 13:15

## 2021-09-07 RX ADMIN — Medication 3 UNIT(S): at 08:41

## 2021-09-07 RX ADMIN — Medication 6: at 17:48

## 2021-09-07 RX ADMIN — INSULIN GLARGINE 4 UNIT(S): 100 INJECTION, SOLUTION SUBCUTANEOUS at 22:25

## 2021-09-07 RX ADMIN — DULOXETINE HYDROCHLORIDE 60 MILLIGRAM(S): 30 CAPSULE, DELAYED RELEASE ORAL at 10:06

## 2021-09-07 RX ADMIN — ZOLPIDEM TARTRATE 5 MILLIGRAM(S): 10 TABLET ORAL at 22:30

## 2021-09-07 RX ADMIN — Medication 5 MILLIGRAM(S): at 22:25

## 2021-09-07 NOTE — PROGRESS NOTE ADULT - SUBJECTIVE AND OBJECTIVE BOX
SUBJECTIVE:  Seen and examined at bedside, doing well.   Oxygenation requirements decreased today - currently on NC.   At the time of my exam, sitting upright in chair.   Reports breathing is improved.    He is using incentive spirometer   Ambulated to bathroom this AM.     ROS otherwise negative.     Vital Signs Last 24 Hrs  T(C): 36.7 (07 Sep 2021 08:33), Max: 36.8 (06 Sep 2021 21:57)  T(F): 98.1 (07 Sep 2021 08:33), Max: 98.2 (06 Sep 2021 21:57)  HR: 64 (07 Sep 2021 08:33) (64 - 83)  BP: 132/80 (07 Sep 2021 08:33) (126/77 - 134/82)  BP(mean): --  RR: 17 (07 Sep 2021 08:33) (17 - 18)  SpO2: 95% (07 Sep 2021 08:33) (92% - 95%)    PHYSICAL EXAMINATION:  GENERAL APPEARANCE:  No distress.   NECK:  Supple. No JVD.   CHEST:  Normal chest excursion. No audible wheezing. She has slight crackles on exam.   ABDOMEN:  Soft, non-distended.   EXTREMITIES:  There is no cyanosis, clubbing or edema.   SKIN:  No rash or significant lesions are noted. No jaundice.  PSYCH: Normal affect.  NEURO: Alert and oriented. Responds to question appropriate. No focal deficits appreciated.       LABS:                        14.4   24.10 )-----------( 377      ( 07 Sep 2021 09:04 )             42.1     09-07    131<L>  |  99  |  29<H>  ----------------------------<  112<H>  4.7   |  27  |  0.84    CT Chest 8/25/21 -   IMPRESSION:  No pulmonary embolism.  Severe patchy and partially confluent groundglass opacity in both lungs with patchy areas of more dense airspace disease is compatible with with COVID-19 pneumonia.  Small pleural effusions bilaterally.  A 2.5 x 2 cm right lower lobe nodular opacity appears more nodular and solid. Numerous mediastinal and hilar lymph nodes measure up to 1.5 cm short axis in subcarina region and 1.4 cm short axis in the left hilum. There is confluent soft tissue in the right hilum, surrounding the right upper lobe bronchus without associated airway narrowing. Underlying malignancy/lung cancer is not excluded. Repeat chest CT scan is recommended in approximately one month after therapy and resolution of COVID-19 pneumonia two reassess the lung findings.    IMPRESSION:  Below-the-knee left deep vein thrombosis involving the gastrocnemius and posterior tibial veins.

## 2021-09-07 NOTE — PROGRESS NOTE ADULT - SUBJECTIVE AND OBJECTIVE BOX
CC: COVID     HPI:  Pt is a 70 yo male with a pmh/o HTN, HLD, skin CA who is a smoker and who quit smoking two weeks ago due to malaise, who presents to ED today due to worsening shortness of breath, cough, body aches and pains, fever, chills. Pt states symptoms have gotten significantly worse over past two days. Pt is not COVID vaccinated. Arrived with oxygen saturation at 86% on RA. Improved to mid 90's on 4L NC. Denies cp, palpitations, n/v/d, abd pain, constipation, rash, leg swelling, calf pain, dysuria, hematuria, sputum production, hemoptysis, HA.        Medical progress: Significant clinical improvement in patient's clinical state. Patient is doing well. Denies any HA, CP, SOB. No fevers, chills or labs.   Complaints: feels great today  State of mind: normal full sentence conversation    REVIEW OF SYSTEMS:  All other review of systems is negative unless indicated above.    PHYSICAL EXAM:  ICU Vital Signs Last 24 Hrs  T(C): 36.7 (07 Sep 2021 08:33), Max: 36.8 (06 Sep 2021 21:57)  T(F): 98.1 (07 Sep 2021 08:33), Max: 98.2 (06 Sep 2021 21:57)  HR: 64 (07 Sep 2021 08:33) (64 - 83)  BP: 132/80 (07 Sep 2021 08:33) (126/77 - 134/82)  RR: 17 (07 Sep 2021 08:33) (17 - 18)  SpO2: 95% (07 Sep 2021 08:33) (92% - 95%)    General: Well developed: looks better,  NAD on HFNC  Eyes: EOMI; conjunctiva and sclera clear  Head: Normocephalic; atraumatic  ENMT: No nasal discharge; airway clear  Neck: Supple; non tender; no masses  Respiratory: Better air entry b.l,  No wheezes, rales or rhonchi  Cardiovascular: Regular rate and rhythm. S1 and S2 Normal;   Gastrointestinal: Soft , non  tender, non distended, + bowel sounds  Genitourinary: No  suprapubic  tenderness  Extremities: No LE  edema  Vascular: Peripheral pulses palpable 2+ bilaterally  Neurological: Alert and oriented x3, non focal   Musculoskeletal: Normal muscle tone and strength   Psychiatric: Cooperative    LABS:        CBC Full  -  ( 04 Sep 2021 07:41 )  WBC Count : 24.97 K/uL  RBC Count : 4.14 M/uL  Hemoglobin : 13.5 g/dL  Hematocrit : 39.2 %  Platelet Count - Automated : 399 K/uL  Mean Cell Volume : 94.7 fl  Mean Cell Hemoglobin : 32.6 pg  Mean Cell Hemoglobin Concentration : 34.4 gm/dL  Auto Neutrophil # : x  Auto Lymphocyte # : x  Auto Monocyte # : x  Auto Eosinophil # : x  Auto Basophil # : x  Auto Neutrophil % : x  Auto Lymphocyte % : x  Auto Monocyte % : x  Auto Eosinophil % : x  Auto Basophil % : x    PT/INR - ( 05 Sep 2021 06:33 )   PT: 12.2 sec;   INR: 1.05 ratio             09-05    x   |  x   |  x   ----------------------------<  x   x    |  x   |  0.77    Ca    8.4<L>      04 Sep 2021 07:41    TPro  6.3  /  Alb  2.5<L>  /  TBili  0.5  /  DBili  0.1  /  AST  40<H>  /  ALT  115<H>  /  AlkPhos  146<H>  09-05            Pt is a 70 yo male with a pmh/o HTN, HLD, skin CA admitted for:       # Acute hypoxic respiratory Failure  secondary to COVID 19 PNA,  superimposed E.COLI PNA, also suspected underline mass and Mediastinal LAD  - CTA chest: neg for PE, extensive pulm infiltrates, with RLL nodularity and mediastinal mass, possible Lung  malignancy   - on NC 2L  - continuous pulse ox  - HFA albuterol Q6 hour PRN   - Dexamethasone /  Remdesivir Day # 10  - d/c Rocephin  - will need f/u CT to follow up on infiltrates and possible mass  in 4-6 weeks   - care discussed with Pulmonary  - Respiratory therapy /  PT eval    # Acute LLE below the knee DVT  - patient needs at least 4-6 months of anticoagulation for documented LE DVT  - This being said, given patient's abnormal CT scans of the lung - you need a close follow up with Pulmonary / Oncology to further evaluate lung nodules /  mass  - CT: A 2.5 x 2 cm right lower lobe nodular opacity appears more nodular and solid.  Numerous mediastinal and hilar lymph nodes measure up to 1.5 cm short axis in subcarina region and 1.4 cm short axis in the left hilum.  There is confluent soft tissue in the right hilum, surrounding the right upper lobe bronchus without associated airway narrowing.  Underlying malignancy/lung cancer is not excluded  - you need a close follow up with [facility] for repeat CT scan of the lung within 4-6 weeks    # Hyperglycemia  - HB A1c 6.3, pre diabetes   - BS elevated 2/2 dexamethasone  - Monitor BS, cover with ISS, on low dose Lantus while on steroids     # Hypokalemia  -repleted   - monitor     # HTN  - monitor BP, stable  - on amlodipine     # Constipation  - CT abd neg for obstruction  - bowel regiment    #  Coagulase negative Staph Bacteremia.    - BCX x 1 set:  + Coagulase neg staph, likely contamination   - repeat  Cx: NGTD  - C/w IV  Rocephin  for sputum ECOLI coverage     #Skin CA  - Pt may use own 5FU cream topically as directed    # Constipation  - Bowel regimen   - had 2 BMs

## 2021-09-07 NOTE — PROGRESS NOTE ADULT - ASSESSMENT
71 year-old man, active smoker, with COVID pneumonia and ARDS.  --Additionally has noted by radiology to have nodular opacity in RLL and mediastinal adenopathy   --Also found to have LE DVT  --Oxygen requirements and clinical status has significantly improved.     Recommendations:  --c/w nasal cannula wean as tolerated.   --On full dose Lovenox will change Eliquis today. Will need anticoagulation for at least 3 months, then can follow up with primary care, pulmonologist or hematologist regarding ongoing treatment.  --Abnormal CT he will need repeat CT and reasonable time interval (approx 4-6 weeks) to assess improvement and resolution. He should follow with pulmonologist - he prefers to follow near [place] because it is closer to his home.     will sign off but notify me with any questions or concerns.

## 2021-09-07 NOTE — PROGRESS NOTE ADULT - PROBLEM SELECTOR PROBLEM 2
1120 Patient allergies and NPO status verified, home medication reconciliation completed and belongings secured. Surgical site verified with patient. Patient verbalizes understanding of pain scale, expected course of stay and plan of care; patient and family state verbal understanding at this time. IV access established. Sequentials in place as ordered.    
1345- Pt to PACU from OR s/p EXCISION, MASS, NECK - RECURRENT TISSUE MASS POSTERIOR NECK, GREATER THAN 5 CENTIMETERS - Right. Report received. Pt obstructing airway, chin lift ineffective. Oral airway placed by anesthesia. Surgical incision closed with dermabond. Pt hypotensive, anesthesia aware.    1400-Oral airway dc'd by RN. Oral secretions removed with wall suction. Pt briefly rouses to touch then drifts back to sleep. FSBG 213, no orders received for this.    1415-Pt sleeping at this time, briefly rousing to voice and touch and quickly back to sleep. NAD observed.    1420-Update to pt's brother, given estimated timeline for dc readiness. States he is 15 minutes away and will come when pt is ready.    1430-Pt spontaneously woke up, RN oriented pt to recovery room and discussed POC. Pt denies nausea, given sips of water.  1442 Report to [facility] for break.  -------------------------------  1515 Report back from [facility], resumed care of pt.  1545 Dr Oseguera in dept, discussed pt's hypotension, will continue to monitor pt. RN to give ephedrine if pt becomes symptomatic.  1553 Update to pt's brother via phone. Updated timeline and dc plan.    1600 Dr Oseguera states pt is ok to go home with current bp trend  1622 Report to Jaclyn DAVALOS in stage 2.   1629 Pt to stage 2 recovery with all belongings.   
1442: Rcvd report from ANOOP Ramos and assumed care. Pt resting comfortably in the gurney, no pain or nausea, sleepy, but arouses to voice. Dermabond dressing CDI. Tolerating room air. BP is low, but increasing rate of fluid.  1451: BP still low, fluids rate still increased, BP cuff repositioned, pt remains sleepy, but arouses to voice. No pain or nausea.  1455: BP improving, fluids still being given, lungs bilaterally are clear. Pain increased, but tolerable, no nausea.   1510: BP is still improving, pt more awake and alert, sitting up in the bed, tolerating sips of juice. Pain remains tolerable.  1517: Report given back to ANOOP Ramos and she reassumed care. BP improved, pain is tolerable, no nausea, tolerating sips of juice, dressing CDI. Tolerating room air.  
1700 D/Gil to care of family post uneventful stay in PACU 2.  
Acute respiratory failure with hypoxia

## 2021-09-07 NOTE — PROGRESS NOTE ADULT - PROBLEM SELECTOR PROBLEM 3
Lung nodules

## 2021-09-08 LAB
HCT VFR BLD CALC: 40.5 % — SIGNIFICANT CHANGE UP (ref 39–50)
HGB BLD-MCNC: 14 G/DL — SIGNIFICANT CHANGE UP (ref 13–17)
INR BLD: 1.02 RATIO — SIGNIFICANT CHANGE UP (ref 0.88–1.16)
MCHC RBC-ENTMCNC: 32.1 PG — SIGNIFICANT CHANGE UP (ref 27–34)
MCHC RBC-ENTMCNC: 34.6 GM/DL — SIGNIFICANT CHANGE UP (ref 32–36)
MCV RBC AUTO: 92.9 FL — SIGNIFICANT CHANGE UP (ref 80–100)
PLATELET # BLD AUTO: 372 K/UL — SIGNIFICANT CHANGE UP (ref 150–400)
PROTHROM AB SERPL-ACNC: 11.9 SEC — SIGNIFICANT CHANGE UP (ref 10.6–13.6)
RBC # BLD: 4.36 M/UL — SIGNIFICANT CHANGE UP (ref 4.2–5.8)
RBC # FLD: 12.9 % — SIGNIFICANT CHANGE UP (ref 10.3–14.5)
WBC # BLD: 24.26 K/UL — HIGH (ref 3.8–10.5)
WBC # FLD AUTO: 24.26 K/UL — HIGH (ref 3.8–10.5)

## 2021-09-08 PROCEDURE — 71045 X-RAY EXAM CHEST 1 VIEW: CPT | Mod: 26

## 2021-09-08 PROCEDURE — 99233 SBSQ HOSP IP/OBS HIGH 50: CPT

## 2021-09-08 RX ADMIN — DULOXETINE HYDROCHLORIDE 60 MILLIGRAM(S): 30 CAPSULE, DELAYED RELEASE ORAL at 09:14

## 2021-09-08 RX ADMIN — Medication 3 UNIT(S): at 12:44

## 2021-09-08 RX ADMIN — APIXABAN 5 MILLIGRAM(S): 2.5 TABLET, FILM COATED ORAL at 09:19

## 2021-09-08 RX ADMIN — Medication 650 MILLIGRAM(S): at 09:20

## 2021-09-08 RX ADMIN — APIXABAN 5 MILLIGRAM(S): 2.5 TABLET, FILM COATED ORAL at 23:07

## 2021-09-08 RX ADMIN — AMLODIPINE BESYLATE 5 MILLIGRAM(S): 2.5 TABLET ORAL at 09:19

## 2021-09-08 RX ADMIN — ZOLPIDEM TARTRATE 5 MILLIGRAM(S): 10 TABLET ORAL at 23:07

## 2021-09-08 RX ADMIN — Medication 3 UNIT(S): at 09:14

## 2021-09-08 RX ADMIN — INSULIN GLARGINE 4 UNIT(S): 100 INJECTION, SOLUTION SUBCUTANEOUS at 23:08

## 2021-09-08 NOTE — PROGRESS NOTE ADULT - SUBJECTIVE AND OBJECTIVE BOX
CC: COVID     HPI:  Pt is a 70 yo male with a pmh/o HTN, HLD, skin CA who is a smoker and who quit smoking two weeks ago due to malaise, who presents to ED today due to worsening shortness of breath, cough, body aches and pains, fever, chills. Pt states symptoms have gotten significantly worse over past two days. Pt is not COVID vaccinated. Arrived with oxygen saturation at 86% on RA. Improved to mid 90's on 4L NC. Denies cp, palpitations, n/v/d, abd pain, constipation, rash, leg swelling, calf pain, dysuria, hematuria, sputum production, hemoptysis, HA.        Medical progress: Denies any HA, CP, SOB. No fevers, chills or shakes. Labs and vitals reviewed. Irritated this am -  as patient claims he did not get ambien -  even though he did.   Complaints: feels great today /  argumentative  State of mind: normal full sentence conversation        REVIEW OF SYSTEMS:  All other review of systems is negative unless indicated above.    PHYSICAL EXAM:  ICU Vital Signs Last 24 Hrs  T(C): 36.7 (07 Sep 2021 08:33), Max: 36.8 (06 Sep 2021 21:57)  T(F): 98.1 (07 Sep 2021 08:33), Max: 98.2 (06 Sep 2021 21:57)  HR: 64 (07 Sep 2021 08:33) (64 - 83)  BP: 132/80 (07 Sep 2021 08:33) (126/77 - 134/82)  RR: 17 (07 Sep 2021 08:33) (17 - 18)  SpO2: 95% (07 Sep 2021 08:33) (92% - 95%)    General: Well developed: looks better,  NAD on HFNC  Eyes: EOMI; conjunctiva and sclera clear  Head: Normocephalic; atraumatic  ENMT: No nasal discharge; airway clear  Neck: Supple; non tender; no masses  Respiratory: Better air entry b.l,  No wheezes, rales or rhonchi  Cardiovascular: Regular rate and rhythm. S1 and S2 Normal;   Gastrointestinal: Soft , non  tender, non distended, + bowel sounds  Genitourinary: No  suprapubic  tenderness  Extremities: No LE  edema  Vascular: Peripheral pulses palpable 2+ bilaterally  Neurological: Alert and oriented x3, non focal   Musculoskeletal: Normal muscle tone and strength   Psychiatric: Cooperative    LABS:        CBC Full  -  ( 04 Sep 2021 07:41 )  WBC Count : 24.97 K/uL  RBC Count : 4.14 M/uL  Hemoglobin : 13.5 g/dL  Hematocrit : 39.2 %  Platelet Count - Automated : 399 K/uL  Mean Cell Volume : 94.7 fl  Mean Cell Hemoglobin : 32.6 pg  Mean Cell Hemoglobin Concentration : 34.4 gm/dL  Auto Neutrophil # : x  Auto Lymphocyte # : x  Auto Monocyte # : x  Auto Eosinophil # : x  Auto Basophil # : x  Auto Neutrophil % : x  Auto Lymphocyte % : x  Auto Monocyte % : x  Auto Eosinophil % : x  Auto Basophil % : x    PT/INR - ( 05 Sep 2021 06:33 )   PT: 12.2 sec;   INR: 1.05 ratio             09-05    x   |  x   |  x   ----------------------------<  x   x    |  x   |  0.77    Ca    8.4<L>      04 Sep 2021 07:41    TPro  6.3  /  Alb  2.5<L>  /  TBili  0.5  /  DBili  0.1  /  AST  40<H>  /  ALT  115<H>  /  AlkPhos  146<H>  09-05            Pt is a 70 yo male with a pmh/o HTN, HLD, skin CA admitted for:       # Acute hypoxic respiratory Failure  secondary to COVID 19 PNA,  superimposed E.COLI PNA, also suspected underline mass and Mediastinal LAD  - CTA chest: neg for PE, extensive pulm infiltrates, with RLL nodularity and mediastinal mass, possible Lung  malignancy   - on NC 2L  - continuous pulse ox  - HFA albuterol Q6 hour PRN   - Dexamethasone /  Remdesivir Day # 10  - d/c Rocephin  - will need f/u CT to follow up on infiltrates and possible mass  in 4-6 weeks   - care discussed with Pulmonary  - Respiratory therapy /  PT eval    # Acute LLE below the knee DVT  - patient needs at least 4-6 months of anticoagulation for documented LE DVT  - This being said, given patient's abnormal CT scans of the lung - you need a close follow up with Pulmonary / Oncology to further evaluate lung nodules /  mass  - CT: A 2.5 x 2 cm right lower lobe nodular opacity appears more nodular and solid.  Numerous mediastinal and hilar lymph nodes measure up to 1.5 cm short axis in subcarina region and 1.4 cm short axis in the left hilum.  There is confluent soft tissue in the right hilum, surrounding the right upper lobe bronchus without associated airway narrowing.  Underlying malignancy/lung cancer is not excluded  - you need a close follow up with [facility] for repeat CT scan of the lung within 4-6 weeks    # Hyperglycemia  - HB A1c 6.3, pre diabetes   - BS elevated 2/2 dexamethasone  - Monitor BS, cover with ISS, on low dose Lantus while on steroids     # Hypokalemia  -repleted   - monitor     # HTN  - monitor BP, stable  - on amlodipine     # Constipation  - CT abd neg for obstruction  - bowel regiment    #  Coagulase negative Staph Bacteremia.    - BCX x 1 set:  + Coagulase neg staph, likely contamination   - repeat  Cx: NGTD  - C/w IV  Rocephin  for sputum ECOLI coverage     #Skin CA  - Pt may use own 5FU cream topically as directed    # Constipation  - Bowel regimen   - had 2 BMs

## 2021-09-08 NOTE — CHART NOTE - NSCHARTNOTEFT_GEN_A_CORE
Patient was at sitting at bedside on room air.. checked room air at rest =86-87% ... He  REFUSED to let me place o2 back on stating "I'll rebound  just let me sit here a while" I explained this was to determine if he needs home o2 and he again refused to let me place o2 back on.

## 2021-09-08 NOTE — PHYSICAL THERAPY INITIAL EVALUATION ADULT - GAIT PATTERN USED, PT EVAL
[address]      Phone: [phone]  Fax: [phone]    Physical Therapy Daily Treatment Note  Date:  2019    Patient Name:  Joni Osorio    :  1967  MRN: [id number]    Restrictions/Precautions:    Diagnosis:  Bilateral hip pain  Treatment Diagnosis:    Insurance/Certification information:  [employer]  Referring Physician:  Ekaterina Ortega MD  Plan of care signed (Y/N):    Visit# / total visits:    Pain level: 8/10 right hip with walking, 0/10 L hip  Time In:  1048  Time Out:  1130    Subjective:  Pt reported compliance w/ L heel lift, and feels it is helping him.      Exercises:  Exercise/Equipment Resistance/Repetitions Other comments     bike 6 mins      Hamstring stretch w/ PFB 20 sec x 3 reps       IT band stretch      W/ PFB 20 sec x 3 reps       Hip flexor/quad stretch 20 sec x 3 reps       Trunk stretch with ball 15 sec x 5 reps       Lower trunk rotation 15 sec x 5 reps       Pelvic tilts 3 sec x 10 reps                                                                                              Other Therapeutic Activities:  NA    Home Exercise Program:  N/A    Manual Treatments:  N/A    Modalities:  US to right greater trochanter, 50%, 1.3 W/cm², 1.0 MHz, x 8 minutes    Timed Code Treatment Minutes:  42  Total Treatment Minutes:  42    Treatment/Activity Tolerance:  [x] Patient tolerated treatment well [] Patient limited by fatigue  [] Patient limited by pain  [] Patient limited by other medical complications  [] Other:     Prognosis: [x] Good [] Fair  [] Poor    Patient Requires Follow-up: [x] Yes  [] No    Plan:   [x] Continue per plan of care [] Alter current plan (see comments)  [] Plan of care initiated [] Hold pending MD visit [] Discharge  Plan for Next Session:        Electronically signed by:  Nabor Terry, PTA [id number] gait

## 2021-09-08 NOTE — PHYSICAL THERAPY INITIAL EVALUATION ADULT - PERTINENT HX OF CURRENT PROBLEM, REHAB EVAL
70 yo male with a pmh/o HTN, HLD, skin CA who is a smoker and who quit smoking two weeks ago due to malaise, who presents to ED today due to worsening shortness of breath, cough, body aches and pains, fever, chills. Pt states symptoms have gotten significantly worse over past two days. Pt is not COVID vaccinated. Arrived with oxygen saturation at 86% on RA. Improved to mid 90's on 4L NC.

## 2021-09-09 LAB
ANION GAP SERPL CALC-SCNC: 6 MMOL/L — SIGNIFICANT CHANGE UP (ref 5–17)
BUN SERPL-MCNC: 30 MG/DL — HIGH (ref 7–23)
CALCIUM SERPL-MCNC: 8.2 MG/DL — LOW (ref 8.5–10.1)
CHLORIDE SERPL-SCNC: 101 MMOL/L — SIGNIFICANT CHANGE UP (ref 96–108)
CO2 SERPL-SCNC: 26 MMOL/L — SIGNIFICANT CHANGE UP (ref 22–31)
CREAT SERPL-MCNC: 0.87 MG/DL — SIGNIFICANT CHANGE UP (ref 0.5–1.3)
GLUCOSE SERPL-MCNC: 98 MG/DL — SIGNIFICANT CHANGE UP (ref 70–99)
HCT VFR BLD CALC: 40.5 % — SIGNIFICANT CHANGE UP (ref 39–50)
HGB BLD-MCNC: 14.2 G/DL — SIGNIFICANT CHANGE UP (ref 13–17)
MCHC RBC-ENTMCNC: 33 PG — SIGNIFICANT CHANGE UP (ref 27–34)
MCHC RBC-ENTMCNC: 35.1 GM/DL — SIGNIFICANT CHANGE UP (ref 32–36)
MCV RBC AUTO: 94.2 FL — SIGNIFICANT CHANGE UP (ref 80–100)
PLATELET # BLD AUTO: 336 K/UL — SIGNIFICANT CHANGE UP (ref 150–400)
POTASSIUM SERPL-MCNC: 4.6 MMOL/L — SIGNIFICANT CHANGE UP (ref 3.5–5.3)
POTASSIUM SERPL-SCNC: 4.6 MMOL/L — SIGNIFICANT CHANGE UP (ref 3.5–5.3)
RBC # BLD: 4.3 M/UL — SIGNIFICANT CHANGE UP (ref 4.2–5.8)
RBC # FLD: 13 % — SIGNIFICANT CHANGE UP (ref 10.3–14.5)
SODIUM SERPL-SCNC: 133 MMOL/L — LOW (ref 135–145)
WBC # BLD: 16.57 K/UL — HIGH (ref 3.8–10.5)
WBC # FLD AUTO: 16.57 K/UL — HIGH (ref 3.8–10.5)

## 2021-09-09 PROCEDURE — 99232 SBSQ HOSP IP/OBS MODERATE 35: CPT

## 2021-09-09 RX ADMIN — AMLODIPINE BESYLATE 5 MILLIGRAM(S): 2.5 TABLET ORAL at 10:54

## 2021-09-09 RX ADMIN — INSULIN GLARGINE 4 UNIT(S): 100 INJECTION, SOLUTION SUBCUTANEOUS at 23:01

## 2021-09-09 RX ADMIN — DULOXETINE HYDROCHLORIDE 60 MILLIGRAM(S): 30 CAPSULE, DELAYED RELEASE ORAL at 10:54

## 2021-09-09 RX ADMIN — ZOLPIDEM TARTRATE 5 MILLIGRAM(S): 10 TABLET ORAL at 23:00

## 2021-09-09 RX ADMIN — APIXABAN 5 MILLIGRAM(S): 2.5 TABLET, FILM COATED ORAL at 23:00

## 2021-09-09 RX ADMIN — APIXABAN 5 MILLIGRAM(S): 2.5 TABLET, FILM COATED ORAL at 10:54

## 2021-09-09 NOTE — PROGRESS NOTE ADULT - PROVIDER SPECIALTY LIST ADULT
Hospitalist
Infectious Disease
Hospitalist
Infectious Disease
Hospitalist
Infectious Disease
Pulmonology
Pulmonology
Hospitalist
Pulmonology

## 2021-09-09 NOTE — PROGRESS NOTE ADULT - SUBJECTIVE AND OBJECTIVE BOX
CC: COVID     HPI:  Pt is a 70 yo male with a pmh/o HTN, HLD, skin CA who is a smoker and who quit smoking two weeks ago due to malaise, who presents to ED today due to worsening shortness of breath, cough, body aches and pains, fever, chills. Pt states symptoms have gotten significantly worse over past two days. Pt is not COVID vaccinated. Arrived with oxygen saturation at 86% on RA. Improved to mid 90's on 4L NC. Denies cp, palpitations, n/v/d, abd pain, constipation, rash, leg swelling, calf pain, dysuria, hematuria, sputum production, hemoptysis, HA.        Medical progress: Denies any HA, CP, SOB. comfortable. Labs and vitals reviewed. Overall patient has made a significant improvements. Currently on 3L of O2. CXRAY shows signs of improvement.   Complaints: feels great today /  argumentative  State of mind: normal full sentence conversation    REVIEW OF SYSTEMS:  All other review of systems is negative unless indicated above.    PHYSICAL EXAM:  T(C): 36.6 (09 Sep 2021 08:06), Max: 36.7 (08 Sep 2021 16:01)  T(F): 97.9 (09 Sep 2021 08:06), Max: 98 (08 Sep 2021 16:01)  HR: 68 (09 Sep 2021 08:06) (68 - 78)  BP: 123/70 (09 Sep 2021 08:06) (123/70 - 133/71)  RR: 17 (09 Sep 2021 08:06) (17 - 18)  SpO2: 97% (09 Sep 2021 08:06) (97% - 100%)  General: Well developed: looks better,  NAD on HFNC  Eyes: EOMI; conjunctiva and sclera clear  Head: Normocephalic; atraumatic  ENMT: No nasal discharge; airway clear  Neck: Supple; non tender; no masses  Respiratory: Better air entry b.l,  No wheezes, rales or rhonchi  Cardiovascular: Regular rate and rhythm. S1 and S2 Normal;   Gastrointestinal: Soft , non  tender, non distended, + bowel sounds  Genitourinary: No  suprapubic  tenderness  Extremities: No LE  edema  Vascular: Peripheral pulses palpable 2+ bilaterally  Neurological: Alert and oriented x3, non focal   Musculoskeletal: Normal muscle tone and strength   Psychiatric: Cooperative    LABS:     CBC Full  -  ( 09 Sep 2021 06:27 )  WBC Count : 16.57 K/uL  RBC Count : 4.30 M/uL  Hemoglobin : 14.2 g/dL  Hematocrit : 40.5 %  Platelet Count - Automated : 336 K/uL  Mean Cell Volume : 94.2 fl  Mean Cell Hemoglobin : 33.0 pg  Mean Cell Hemoglobin Concentration : 35.1 gm/dL  Auto Neutrophil # : x  Auto Lymphocyte # : x  Auto Monocyte # : x  Auto Eosinophil # : x  Auto Basophil # : x  Auto Neutrophil % : x  Auto Lymphocyte % : x  Auto Monocyte % : x  Auto Eosinophil % : x  Auto Basophil % : x    PT/INR - ( 08 Sep 2021 06:59 )   PT: 11.9 sec;   INR: 1.02 ratio             09-09    133<L>  |  101  |  30<H>  ----------------------------<  98  4.6   |  26  |  0.87    Ca    8.2<L>      09 Sep 2021 06:27    TPro  6.4  /  Alb  2.5<L>  /  TBili  0.6  /  DBili  0.1  /  AST  28  /  ALT  92<H>  /  AlkPhos  123<H>  09-08            Pt is a 70 yo male with a pmh/o HTN, HLD, skin CA admitted for:     # Acute hypoxic respiratory Failure  secondary to COVID 19 PNA,  superimposed E.COLI PNA, also suspected underline mass and Mediastinal LAD  - CTA chest: neg for PE, extensive pulm infiltrates, with RLL nodularity and mediastinal mass, possible Lung  malignancy   - on NC 3L   - continuous pulse ox  - Full course of Dexamethasone /  Remdesivir    - d/c Rocephin  - will need f/u CT to follow up on infiltrates and possible mass  in 4-6 weeks   - daily discussions with Pulmonary  - Respiratory therapy /  PT eval    # Acute LLE below the knee DVT  - patient needs at least 4-6 months of anticoagulation for documented LE DVT  - This being said, given patient's abnormal CT scans of the lung - you need a close follow up with Pulmonary / Oncology to further evaluate lung nodules /  mass  - CT: A 2.5 x 2 cm right lower lobe nodular opacity appears more nodular and solid.  Numerous mediastinal and hilar lymph nodes measure up to 1.5 cm short axis in subcarina region and 1.4 cm short axis in the left hilum.  There is confluent soft tissue in the right hilum, surrounding the right upper lobe bronchus without associated airway narrowing.  Underlying malignancy/lung cancer is not excluded  - you need a close follow up with [facility] for repeat CT scan of the lung within 4-6 weeks    # Hyperglycemia  - HB A1c 6.3, pre diabetes   - BS elevated 2/2 dexamethasone  - Monitor BS, cover with ISS, on low dose Lantus while on steroids     # Hypokalemia  - replete     # HTN  - monitor BP, stable  - on amlodipine     # Constipation  - CT abd neg for obstruction  - bowel regiment    #  Coagulase negative Staph Bacteremia.    - BCX x 1 set:  + Coagulase neg staph, likely contamination   - repeat  Cx: NGTD  - C/w IV  Rocephin  for sputum ECOLI coverage     #Skin CA  - Pt may use own 5FU cream topically as directed    # Constipation  - Bowel regimen   - had 2 BMs

## 2021-09-09 NOTE — PROGRESS NOTE ADULT - REASON FOR ADMISSION
COVID

## 2021-09-09 NOTE — PROGRESS NOTE ADULT - NUTRITIONAL ASSESSMENT
This patient has been assessed with a concern for Malnutrition and has been determined to have a diagnosis/diagnoses of Moderate protein-calorie malnutrition.    This patient is being managed with:   Diet DASH/TLC-  Sodium & Cholesterol Restricted  Entered: Aug 25 2021 10:30PM    The following pending diet order is being considered for treatment of Moderate protein-calorie malnutrition:  Diet Consistent Carbohydrate w/Evening Snack-  DASH/TLC {Sodium & Cholesterol Restricted} (DASH)  Supplement Feeding Modality:  Oral  Glucerna Shake Cans or Servings Per Day:  1       Frequency:  Three Times a day  Entered: Aug 26 2021  1:28PM  
This patient has been assessed with a concern for Malnutrition and has been determined to have a diagnosis/diagnoses of Moderate protein-calorie malnutrition.    This patient is being managed with:   Diet DASH/TLC-  Sodium & Cholesterol Restricted  Entered: Aug 25 2021 10:30PM    The following pending diet order is being considered for treatment of Moderate protein-calorie malnutrition:  Diet Consistent Carbohydrate w/Evening Snack-  DASH/TLC {Sodium & Cholesterol Restricted} (DASH)  Supplement Feeding Modality:  Oral  Glucerna Shake Cans or Servings Per Day:  1       Frequency:  Three Times a day  Entered: Aug 26 2021  1:28PM    This patient has been assessed with a concern for Malnutrition and has been determined to have a diagnosis/diagnoses of Moderate protein-calorie malnutrition.    This patient is being managed with:   Diet DASH/TLC-  Sodium & Cholesterol Restricted  Entered: Aug 25 2021 10:30PM    The following pending diet order is being considered for treatment of Moderate protein-calorie malnutrition:  Diet Consistent Carbohydrate w/Evening Snack-  DASH/TLC {Sodium & Cholesterol Restricted} (DASH)  Supplement Feeding Modality:  Oral  Glucerna Shake Cans or Servings Per Day:  1       Frequency:  Three Times a day  Entered: Aug 26 2021  1:28PM  
This patient has been assessed with a concern for Malnutrition and has been determined to have a diagnosis/diagnoses of Moderate protein-calorie malnutrition.    This patient is being managed with:   Diet DASH/TLC-  Sodium & Cholesterol Restricted  Entered: Aug 25 2021 10:30PM    The following pending diet order is being considered for treatment of Moderate protein-calorie malnutrition:  Diet Consistent Carbohydrate w/Evening Snack-  DASH/TLC {Sodium & Cholesterol Restricted} (DASH)  Supplement Feeding Modality:  Oral  Glucerna Shake Cans or Servings Per Day:  1       Frequency:  Three Times a day  Entered: Aug 26 2021  1:28PM  

## 2021-09-10 ENCOUNTER — TRANSCRIPTION ENCOUNTER (OUTPATIENT)
Age: 71
End: 2021-09-10

## 2021-09-10 VITALS
OXYGEN SATURATION: 98 % | TEMPERATURE: 98 F | SYSTOLIC BLOOD PRESSURE: 118 MMHG | HEART RATE: 71 BPM | DIASTOLIC BLOOD PRESSURE: 78 MMHG | RESPIRATION RATE: 18 BRPM

## 2021-09-10 PROCEDURE — 99239 HOSP IP/OBS DSCHRG MGMT >30: CPT

## 2021-09-10 RX ORDER — AMLODIPINE BESYLATE 2.5 MG/1
1 TABLET ORAL
Qty: 0 | Refills: 0 | DISCHARGE

## 2021-09-10 RX ORDER — POLYETHYLENE GLYCOL 3350 17 G/17G
17 POWDER, FOR SOLUTION ORAL
Qty: 0 | Refills: 0 | DISCHARGE
Start: 2021-09-10

## 2021-09-10 RX ORDER — AMLODIPINE BESYLATE 2.5 MG/1
0.5 TABLET ORAL
Qty: 0 | Refills: 0 | DISCHARGE

## 2021-09-10 RX ORDER — APIXABAN 2.5 MG/1
1 TABLET, FILM COATED ORAL
Qty: 60 | Refills: 0
Start: 2021-09-10 | End: 2021-10-09

## 2021-09-10 RX ADMIN — APIXABAN 5 MILLIGRAM(S): 2.5 TABLET, FILM COATED ORAL at 10:55

## 2021-09-10 RX ADMIN — AMLODIPINE BESYLATE 5 MILLIGRAM(S): 2.5 TABLET ORAL at 10:55

## 2021-09-10 RX ADMIN — DULOXETINE HYDROCHLORIDE 60 MILLIGRAM(S): 30 CAPSULE, DELAYED RELEASE ORAL at 10:55

## 2021-09-10 NOTE — DISCHARGE NOTE NURSING/CASE MANAGEMENT/SOCIAL WORK - NSDCPEFALRISK_GEN_ALL_CORE
For information on Fall & injury Prevention, visit [email or web address]

## 2021-09-10 NOTE — DISCHARGE NOTE PROVIDER - PROVIDER TOKENS
PROVIDER:[TOKEN:[[id number]],FOLLOWUP:[2 weeks]],FREE:[LAST[facility]PHONE:[(   )    -],FAX:[(   )    -],FOLLOWUP:[1 week]]

## 2021-09-10 NOTE — DISCHARGE NOTE NURSING/CASE MANAGEMENT/SOCIAL WORK - PATIENT PORTAL LINK FT
You can access the FollowMyHealth Patient Portal offered by [facility] by registering at the following website: [email or web address]. By joining [employer]’s FollowMyHealth portal, you will also be able to view your health information using other applications (apps) compatible with our system.

## 2021-09-10 NOTE — DISCHARGE NOTE PROVIDER - NSDCCPCAREPLAN_GEN_ALL_CORE_FT
PRINCIPAL DISCHARGE DIAGNOSIS  Diagnosis: Acute respiratory failure, unspecified whether with hypoxia or hypercapnia  Assessment and Plan of Treatment: C/w oxygen via nasal  canula  F/u with Pulm in 2 weeks      SECONDARY DISCHARGE DIAGNOSES  Diagnosis: Pneumonia due to COVID-19 virus  Assessment and Plan of Treatment: Completed course of Remdesivir and Dexamethasone   C/w Oxygen  F/u with PCP    Diagnosis: Abnormal chest CT  Assessment and Plan of Treatment: Enlarged Lymph nodes and possible mass, need  COVID infiltrates resolution  to further evaluate   F/u with Pulmonologist for repeat scan in 4 weeks    Diagnosis: HTN (hypertension)  Assessment and Plan of Treatment: Amlodipine dose decreased to  5 mg as BP low normal   F/u with PCP    Diagnosis: Prediabetes  Assessment and Plan of Treatment: Follow low carb diet   F/u with PCP

## 2021-09-10 NOTE — DISCHARGE NOTE NURSING/CASE MANAGEMENT/SOCIAL WORK - NSDCPEEMAIL_GEN_ALL_CORE
[facility] for Tobacco Control email [email or web address]

## 2021-09-10 NOTE — DISCHARGE NOTE PROVIDER - NSDCMRMEDTOKEN_GEN_ALL_CORE_FT
Ambien 10 mg oral tablet: 1 tab(s) orally once a day (at bedtime)  amLODIPine 10 mg oral tablet: 0.5 tab(s) orally once a day  apixaban 5 mg oral tablet: 1 tab(s) orally every 12 hours  Cymbalta 60 mg oral delayed release capsule: 1 cap(s) orally once a day  FLUOROURACIL 5% CREAM: apply to affected area topically once daily as directed  polyethylene glycol 3350 oral powder for reconstitution: 17 gram(s) orally once a day, As Needed for constipation

## 2021-09-10 NOTE — DISCHARGE NOTE PROVIDER - HOSPITAL COURSE
72 yo male with a pmh/o HTN, HLD, skin CA who is a smoker and who quit smoking two weeks ago due to malaise, who presented  to ED due to worsening shortness of breath, cough, body aches and pains, fever, chills. Pt stated symptoms have gotten significantly worse over few days. Pt is not COVID vaccinated. Arrived with oxygen saturation at 86% on RA. CT chest: + B/l infiltrates. Pt was admitted for Acute Hypoxic respiratory failure 2/2  COVID  19 PNA. Pt was started on O2 via NC, his respiratory status initially was getting worse, Pt required HFNC, Tx with Dexa and Remdesivir. Pts respiratory status eventually start to improve. Now Pt tolerates 2L NC  at rest. Hospital stay notable for increased D-dimers, LE dopplers  showed LLE DVTs, was unable to r/o PE as Pt was on HFNC, was started on Tx dose Lovenox and then switched to Eliquis. Pt was followed by ID and Pulm. Also his sputum Cx + ECOLI, pt was Tx with IV abxs for secondary bacterial PNA.   Today Pt reports that feels well, no SOB with O2, chough resolved, better appetite. D/c planning and follow up discussed. Pt reports that is following with pulm at [facility] for h/o Pulm nodules   Vital Signs Last 24 Hrs  T(C): 36.6 (10 Sep 2021 08:27), Max: 37 (09 Sep 2021 23:41)  T(F): 97.9 (10 Sep 2021 08:27), Max: 98.6 (09 Sep 2021 23:41)  HR: 71 (10 Sep 2021 08:27) (71 - 82)  BP: 118/78 (10 Sep 2021 08:27) (118/69 - 118/78)  BP(mean): 90 (10 Sep 2021 08:27) (90 - 90)  RR: 18 (10 Sep 2021 08:27) (18 - 18)  SpO2: 98% (10 Sep 2021 08:27) (96% - 100%)  PHYSICAL EXAM:  General: Well developed: looks better,  NAD on  NC   Eyes: EOMI; conjunctiva and sclera clear  Head: Normocephalic; atraumatic  ENMT: No nasal discharge; airway clear  Neck: Supple; non tender; no masses  Respiratory: Good air entry b/l,   No wheezes, rales or rhonchi  Cardiovascular: Regular rate and rhythm. S1 and S2 Normal;   Gastrointestinal: Soft , non  tender, non distended, + bowel sounds  Genitourinary: No  suprapubic  tenderness  Extremities: No LE  edema  Vascular: Peripheral pulses palpable 2+ bilaterally  Neurological: Alert and oriented x3, non focal   Musculoskeletal: Normal muscle tone and strength   Psychiatric: Cooperative    Pt is a 72 yo male with a pmh/o HTN, HLD, skin CA admitted for:       #Acute hypoxic respiratory Failure  secondary to COVID 19 PNA,  superimposed E.COLI PNA, also suspected underline mass and Mediastinal LAD  - CTA chest: neg for PE, extensive pulm infiltrates, with RLL nodularity and mediastinal mass, possible Lung  malignancy   - S/p HF nasal cannula,   now on 2L NC   - continuous pulse ox  - Completed 10 days of   Dexamethasone & Remdesivir   - Completed  Ceftriaxone IV 8 days   -will need f/u CT to follow up on infiltrates and possible mass  in 4-6 weeks   - D/w Pulm       # Hyperglycemia  - HB A1c 6.3, pre diabetes   - BS elevated 2/2 dexamethasone, now improved   - was on low dose Lantus while on steroids   - C/w diabetic diet       #Hypokalemia  -repleted   - monitor     #HTN  - monitor BP, stable  - on amlodipine decreased dose   -F/u with PCP    # Constipation, resolved   CT abd neg for obstruction  bowel regiment      #  Coagulase negative Staph Bacteremia.    BCX x 1 set:  + Coagulase neg staph, likely contamination   repeat  Cx: NGTD  C/w IV  Rocephin  for sputum ECOLI coverage       # Acute LLE below the knee DVT  - as pt is high risk and cant r/o PE ( Pt was on HFNC) c/w  Full dose A/c  - S/p Lovenox 90mg SQ BID  - C/w Eliquis  maintenance dose   -F/u with PCP      #Skin CA  C/w 5FU cream topically as directed        Dispo: stable for d/c home with HC   Total time 45 min   Fax d/c summary to PCP

## 2021-09-10 NOTE — DISCHARGE NOTE NURSING/CASE MANAGEMENT/SOCIAL WORK - NSDCPEWEB_GEN_ALL_CORE
[facility] for Tobacco Control website --- [email or web address] website --- [email or web address]

## 2021-09-10 NOTE — PROVIDER CONTACT NOTE (OTHER) - NAME OF MD/NP/PA/DO NOTIFIED:
Consult called in for Dr. Nassar. QUINTON Rae at [facility]
no PCP to fax discharge papers to
Dr. Fontanez

## 2021-09-10 NOTE — DISCHARGE NOTE PROVIDER - CARE PROVIDER_API CALL
Curtis Delvalle)  Critical Care Medicine; Internal Medicine; Pulmonary Disease  [address] [place] [address]  Phone: [phone]  Fax: [phone]  Follow Up Time: 2 weeks    PCP at [facility],   Phone: (   )    -  Fax: (   )    -  Follow Up Time: 1 week

## 2021-09-11 ENCOUNTER — TRANSCRIPTION ENCOUNTER (OUTPATIENT)
Age: 71
End: 2021-09-11

## 2021-09-15 DIAGNOSIS — E87.6 HYPOKALEMIA: ICD-10-CM

## 2021-09-15 DIAGNOSIS — R73.03 PREDIABETES: ICD-10-CM

## 2021-09-15 DIAGNOSIS — J44.0 CHRONIC OBSTRUCTIVE PULMONARY DISEASE WITH (ACUTE) LOWER RESPIRATORY INFECTION: ICD-10-CM

## 2021-09-15 DIAGNOSIS — E78.5 HYPERLIPIDEMIA, UNSPECIFIED: ICD-10-CM

## 2021-09-15 DIAGNOSIS — K59.00 CONSTIPATION, UNSPECIFIED: ICD-10-CM

## 2021-09-15 DIAGNOSIS — R59.0 LOCALIZED ENLARGED LYMPH NODES: ICD-10-CM

## 2021-09-15 DIAGNOSIS — F17.210 NICOTINE DEPENDENCE, CIGARETTES, UNCOMPLICATED: ICD-10-CM

## 2021-09-15 DIAGNOSIS — R91.8 OTHER NONSPECIFIC ABNORMAL FINDING OF LUNG FIELD: ICD-10-CM

## 2021-09-15 DIAGNOSIS — K13.79 OTHER LESIONS OF ORAL MUCOSA: ICD-10-CM

## 2021-09-15 DIAGNOSIS — J12.82 PNEUMONIA DUE TO CORONAVIRUS DISEASE 2019: ICD-10-CM

## 2021-09-15 DIAGNOSIS — E44.0 MODERATE PROTEIN-CALORIE MALNUTRITION: ICD-10-CM

## 2021-09-15 DIAGNOSIS — E88.09 OTHER DISORDERS OF PLASMA-PROTEIN METABOLISM, NOT ELSEWHERE CLASSIFIED: ICD-10-CM

## 2021-09-15 DIAGNOSIS — J90 PLEURAL EFFUSION, NOT ELSEWHERE CLASSIFIED: ICD-10-CM

## 2021-09-15 DIAGNOSIS — J15.5 PNEUMONIA DUE TO ESCHERICHIA COLI: ICD-10-CM

## 2021-09-15 DIAGNOSIS — J44.9 CHRONIC OBSTRUCTIVE PULMONARY DISEASE, UNSPECIFIED: ICD-10-CM

## 2021-09-15 DIAGNOSIS — U07.1 COVID-19: ICD-10-CM

## 2021-09-15 DIAGNOSIS — I82.442 ACUTE EMBOLISM AND THROMBOSIS OF LEFT TIBIAL VEIN: ICD-10-CM

## 2021-09-15 DIAGNOSIS — J96.01 ACUTE RESPIRATORY FAILURE WITH HYPOXIA: ICD-10-CM

## 2021-09-15 DIAGNOSIS — Z88.0 ALLERGY STATUS TO PENICILLIN: ICD-10-CM

## 2021-09-15 DIAGNOSIS — I82.462 ACUTE EMBOLISM AND THROMBOSIS OF LEFT CALF MUSCULAR VEIN: ICD-10-CM

## 2021-09-15 DIAGNOSIS — I10 ESSENTIAL (PRIMARY) HYPERTENSION: ICD-10-CM

## 2021-09-15 DIAGNOSIS — C44.90 UNSPECIFIED MALIGNANT NEOPLASM OF SKIN, UNSPECIFIED: ICD-10-CM

## 2022-03-22 ENCOUNTER — APPOINTMENT (OUTPATIENT)
Dept: ULTRASOUND IMAGING | Facility: CLINIC | Age: 72
End: 2022-03-22
Payer: MEDICARE

## 2022-03-22 PROBLEM — Z87.898 PERSONAL HISTORY OF OTHER SPECIFIED CONDITIONS: Chronic | Status: ACTIVE | Noted: 2021-08-26

## 2022-03-22 PROBLEM — C44.90 UNSPECIFIED MALIGNANT NEOPLASM OF SKIN, UNSPECIFIED: Chronic | Status: ACTIVE | Noted: 2021-08-26

## 2022-03-22 PROBLEM — E78.5 HYPERLIPIDEMIA, UNSPECIFIED: Chronic | Status: ACTIVE | Noted: 2021-08-26

## 2022-03-22 PROBLEM — I10 ESSENTIAL (PRIMARY) HYPERTENSION: Chronic | Status: ACTIVE | Noted: 2021-08-26

## 2022-03-22 PROCEDURE — 93970 EXTREMITY STUDY: CPT

## 2023-02-02 ENCOUNTER — OFFICE (OUTPATIENT)
Dept: URBAN - METROPOLITAN AREA CLINIC 38 | Facility: CLINIC | Age: 73
Setting detail: OPHTHALMOLOGY
End: 2023-02-02
Payer: MEDICARE

## 2023-02-02 DIAGNOSIS — H16.223: ICD-10-CM

## 2023-02-02 DIAGNOSIS — H25.12: ICD-10-CM

## 2023-02-02 DIAGNOSIS — H11.153: ICD-10-CM

## 2023-02-02 DIAGNOSIS — Z96.1: ICD-10-CM

## 2023-02-02 DIAGNOSIS — H02.011: ICD-10-CM

## 2023-02-02 DIAGNOSIS — H26.491: ICD-10-CM

## 2023-02-02 DIAGNOSIS — H02.014: ICD-10-CM

## 2023-02-02 DIAGNOSIS — H02.34: ICD-10-CM

## 2023-02-02 DIAGNOSIS — H02.31: ICD-10-CM

## 2023-02-02 PROCEDURE — 92012 INTRM OPH EXAM EST PATIENT: CPT | Performed by: OPHTHALMOLOGY

## 2023-02-02 ASSESSMENT — REFRACTION_MANIFEST
OS_AXIS: 090
OS_CYLINDER: -1.00
OD_ADD: +2.50
OD_ADD: +2.75
OD_SPHERE: -1.00
OD_VA2: 20/20(J1+)
OS_SPHERE: +2.00
OS_VA2: 20/20(J1+)
OD_AXIS: 110
OS_VA1: 20/25-
OS_AXIS: 095
OU_VA: 20/20-1
OS_VA2: 20/20(J1+)
OD_CYLINDER: -1.50
OS_CYLINDER: -0.75
OD_VA2: 20/20(J1+)
OS_VA1: 20/25-2
OD_VA1: 20/20
OU_VA: 20/20-
OD_SPHERE: -0.75
OS_SPHERE: +2.25
OS_ADD: +2.75
OD_CYLINDER: -1.50
OD_VA1: 20/20-2
OS_ADD: +2.50
OD_AXIS: 110

## 2023-02-02 ASSESSMENT — LID POSITION - COMMENTS
OS_COMMENTS: WITH HOODING
OD_COMMENTS: WITH HOODING

## 2023-02-02 ASSESSMENT — AXIALLENGTH_DERIVED
OS_AL: 22.1264
OD_AL: 23.4936
OS_AL: 22.2991
OD_AL: 23.3974
OS_AL: 22.2557
OD_AL: 23.302

## 2023-02-02 ASSESSMENT — SPHEQUIV_DERIVED
OD_SPHEQUIV: -1.75
OD_SPHEQUIV: -1.5
OS_SPHEQUIV: 1.75
OD_SPHEQUIV: -1.25
OS_SPHEQUIV: 2.125
OS_SPHEQUIV: 1.625

## 2023-02-02 ASSESSMENT — REFRACTION_CURRENTRX
OS_SPHERE: +2.00
OD_AXIS: 101
OS_CYLINDER: -0.75
OD_CYLINDER: -1.50
OS_VPRISM_DIRECTION: SV
OS_OVR_VA: 20/
OD_OVR_VA: 20/
OS_OVR_VA: 20/
OD_OVR_VA: 20/
OS_AXIS: 098
OD_CYLINDER: -1.25
OS_VPRISM_DIRECTION: SV
OD_SPHERE: -0.75
OD_VPRISM_DIRECTION: SV
OS_SPHERE: +4.25
OD_VPRISM_DIRECTION: SV
OD_AXIS: 114
OS_CYLINDER: -1.00
OS_AXIS: 084
OD_SPHERE: +1.75

## 2023-02-02 ASSESSMENT — KERATOMETRY
OS_K2POWER_DIOPTERS: 45.75
OD_K1POWER_DIOPTERS: 45.25
OD_K2POWER_DIOPTERS: 46.00
OS_K1POWER_DIOPTERS: 45.25
OD_AXISANGLE_DEGREES: 034
OS_AXISANGLE_DEGREES: 164
METHOD_AUTO_MANUAL: AUTO

## 2023-02-02 ASSESSMENT — VISUAL ACUITY
OD_BCVA: 20/25-
OS_BCVA: 20/25-

## 2023-02-02 ASSESSMENT — CONFRONTATIONAL VISUAL FIELD TEST (CVF)
OS_FINDINGS: FULL
OD_FINDINGS: FULL

## 2023-02-02 ASSESSMENT — LID EXAM ASSESSMENTS
OD_TRICHIASIS: RUL 1+
OS_TRICHIASIS: LUL 1+

## 2023-02-02 ASSESSMENT — REFRACTION_AUTOREFRACTION
OD_SPHERE: -0.25
OS_AXIS: 085
OD_CYLINDER: -2.00
OD_AXIS: 106
OS_CYLINDER: -1.75
OS_SPHERE: +3.00

## 2023-02-02 ASSESSMENT — LID POSITION - DERMATOCHALASIS
OD_DERMATOCHALASIS: 2+
OS_DERMATOCHALASIS: 2+

## 2023-02-27 ENCOUNTER — OFFICE (OUTPATIENT)
Dept: URBAN - METROPOLITAN AREA CLINIC 8 | Facility: CLINIC | Age: 73
Setting detail: OPHTHALMOLOGY
End: 2023-02-27
Payer: MEDICARE

## 2023-02-27 DIAGNOSIS — H25.12: ICD-10-CM

## 2023-02-27 DIAGNOSIS — H04.222: ICD-10-CM

## 2023-02-27 DIAGNOSIS — H02.032: ICD-10-CM

## 2023-02-27 DIAGNOSIS — H26.491: ICD-10-CM

## 2023-02-27 PROBLEM — H02.83 DERMATOCHALASIS: Status: ACTIVE | Noted: 2023-02-27

## 2023-02-27 PROBLEM — H02.014 TRICHIASIS; RIGHT UPPER LID, LEFT UPPER LID: Status: ACTIVE | Noted: 2023-02-02

## 2023-02-27 PROBLEM — H02.31 BLEPHAROCHALASIS; RIGHT UPPER LID, LEFT UPPER LID: Status: ACTIVE | Noted: 2023-02-02

## 2023-02-27 PROBLEM — H02.34 BLEPHAROCHALASIS; RIGHT UPPER LID, LEFT UPPER LID: Status: ACTIVE | Noted: 2023-02-02

## 2023-02-27 PROBLEM — H02.011 TRICHIASIS; RIGHT UPPER LID, LEFT UPPER LID: Status: ACTIVE | Noted: 2023-02-02

## 2023-02-27 PROCEDURE — 99214 OFFICE O/P EST MOD 30 MIN: CPT | Performed by: OPHTHALMOLOGY

## 2023-02-27 ASSESSMENT — REFRACTION_MANIFEST
OS_ADD: +2.50
OD_SPHERE: -0.75
OS_VA1: 20/25-2
OD_ADD: +2.75
OD_AXIS: 110
OD_VA2: 20/20(J1+)
OS_SPHERE: +2.25
OS_SPHERE: +2.00
OS_VA2: 20/20(J1+)
OD_CYLINDER: -1.50
OU_VA: 20/20-1
OU_VA: 20/20-
OD_SPHERE: -1.00
OS_AXIS: 095
OD_ADD: +2.50
OS_VA1: 20/25-
OD_CYLINDER: -1.50
OS_AXIS: 090
OD_VA2: 20/20(J1+)
OS_ADD: +2.75
OS_CYLINDER: -0.75
OS_VA2: 20/20(J1+)
OD_VA1: 20/20
OS_CYLINDER: -1.00
OD_VA1: 20/20-2
OD_AXIS: 110

## 2023-02-27 ASSESSMENT — AXIALLENGTH_DERIVED
OD_AL: 23.4029
OS_AL: 22.4229
OD_AL: 23.3074
OS_AL: 22.379
OD_AL: 23.4029
OS_AL: 22.2483

## 2023-02-27 ASSESSMENT — SPHEQUIV_DERIVED
OS_SPHEQUIV: 1.75
OS_SPHEQUIV: 2.125
OD_SPHEQUIV: -1.75
OD_SPHEQUIV: -1.75
OS_SPHEQUIV: 1.625
OD_SPHEQUIV: -1.5

## 2023-02-27 ASSESSMENT — REFRACTION_CURRENTRX
OS_OVR_VA: 20/
OS_OVR_VA: 20/
OD_AXIS: 101
OD_CYLINDER: -1.25
OS_SPHERE: +1.75
OS_VPRISM_DIRECTION: SV
OD_OVR_VA: 20/
OS_CYLINDER: -1.00
OS_SPHERE: +4.25
OD_OVR_VA: 20/
OD_SPHERE: +1.75
OS_VPRISM_DIRECTION: SV
OD_VPRISM_DIRECTION: SV
OD_VPRISM_DIRECTION: SV
OD_AXIS: 104
OD_CYLINDER: -1.25
OS_AXIS: 084
OD_SPHERE: -0.75
OS_CYLINDER: -1.00
OS_AXIS: 090

## 2023-02-27 ASSESSMENT — REFRACTION_AUTOREFRACTION
OD_CYLINDER: -1.50
OD_SPHERE: -1.00
OD_AXIS: 112
OS_SPHERE: +3.00
OS_AXIS: 084
OS_CYLINDER: -1.75

## 2023-02-27 ASSESSMENT — KERATOMETRY
METHOD_AUTO_MANUAL: AUTO
OS_AXISANGLE_DEGREES: 173
OS_K1POWER_DIOPTERS: 44.75
OS_K2POWER_DIOPTERS: 45.50
OD_K1POWER_DIOPTERS: 45.75
OD_K2POWER_DIOPTERS: 46.00
OD_AXISANGLE_DEGREES: 040

## 2023-02-27 ASSESSMENT — VISUAL ACUITY
OD_BCVA: 20/30
OS_BCVA: 20/30+

## 2023-02-27 ASSESSMENT — LID EXAM ASSESSMENTS
OD_TRICHIASIS: RUL 1+
OS_TRICHIASIS: LUL 1+

## 2023-02-27 ASSESSMENT — LID POSITION - DERMATOCHALASIS
OS_DERMATOCHALASIS: 2+
OD_DERMATOCHALASIS: 2+

## 2023-02-27 ASSESSMENT — CONFRONTATIONAL VISUAL FIELD TEST (CVF)
OS_FINDINGS: FULL
OD_FINDINGS: FULL

## 2023-02-27 ASSESSMENT — LID POSITION - ENTROPION: OD_ENTROPION: RLL

## 2023-04-04 ENCOUNTER — NON-APPOINTMENT (OUTPATIENT)
Age: 73
End: 2023-04-04

## 2023-04-04 ENCOUNTER — APPOINTMENT (OUTPATIENT)
Dept: OTOLARYNGOLOGY | Facility: CLINIC | Age: 73
End: 2023-04-04
Payer: MEDICARE

## 2023-04-04 VITALS
BODY MASS INDEX: 28 KG/M2 | DIASTOLIC BLOOD PRESSURE: 75 MMHG | SYSTOLIC BLOOD PRESSURE: 124 MMHG | HEART RATE: 64 BPM | HEIGHT: 71 IN | WEIGHT: 200 LBS

## 2023-04-04 DIAGNOSIS — R44.8 OTHER SYMPTOMS AND SIGNS INVOLVING GENERAL SENSATIONS AND PERCEPTIONS: ICD-10-CM

## 2023-04-04 DIAGNOSIS — R51.9 HEADACHE, UNSPECIFIED: ICD-10-CM

## 2023-04-04 DIAGNOSIS — K13.79 OTHER LESIONS OF ORAL MUCOSA: ICD-10-CM

## 2023-04-04 DIAGNOSIS — J31.0 CHRONIC RHINITIS: ICD-10-CM

## 2023-04-04 DIAGNOSIS — J34.2 DEVIATED NASAL SEPTUM: ICD-10-CM

## 2023-04-04 DIAGNOSIS — J01.90 ACUTE SINUSITIS, UNSPECIFIED: ICD-10-CM

## 2023-04-04 PROCEDURE — 99203 OFFICE O/P NEW LOW 30 MIN: CPT | Mod: 25

## 2023-04-04 PROCEDURE — 31231 NASAL ENDOSCOPY DX: CPT

## 2023-04-04 RX ORDER — CLARITHROMYCIN 500 MG/1
500 TABLET, FILM COATED ORAL
Qty: 20 | Refills: 0 | Status: ACTIVE | COMMUNITY
Start: 2023-04-04 | End: 1900-01-01

## 2023-04-04 NOTE — PHYSICAL EXAM
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 sinuses nontender to percussion  [de-identified] : MEHNAZ

## 2023-04-04 NOTE — HISTORY OF PRESENT ILLNESS
[de-identified] : Patient presents stating he believes he has a sinus infection that has been going on for 2 weeks. He states having pain over his right eye and teeth. He states that his mucus is greenish/yellow-brown. Patient states his sinuses have always been irritated since 9/11. He denies ever having a CT Scan of his sinuses done. He admits to difficulty breathing through is nose. Patient states he is on Doxycycline right now, prescribed by Urgent Care and has two days left of it. He states he is allergic to Penicillin.

## 2023-04-04 NOTE — ASSESSMENT
[FreeTextEntry1] : Reviewed and reconciled medications, allergies, PMHx, PSHx, SocHx, FMHx \par \par Patient presents stating he believes he has a sinus infection that has been going on for 2 weeks. He states having pain over his right eye and teeth. He states that his mucus is greenish/yellow-brown. Patient states his sinuses have always been irritated since 9/11. He denies ever having a CT Scan of his sinuses done. He admits to difficulty breathing through is nose. Patient states he is on Doxycycline right now, prescribed by Urgent Care and has two days left of it. He states he is allergic to Penicillin. 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 Nasal Culture taken. Use saline spray and steam. Start Clarithromycin - 1 pill twice a day with food. Will FU when culture results come back. Can use Afrin nasal spray for 3 days.

## 2023-04-04 NOTE — PROCEDURE
[Recalcitrant Symptoms] : recalcitrant symptoms  [FreeTextEntry6] : Rigid Nasal Endoscopy:\par Left Side:\par -discharge\par Right Side:\par -thick mucoid secretions, suctioned into culture trap

## 2023-04-04 NOTE — REVIEW OF SYSTEMS
[Post Nasal Drip] : post nasal drip [Ear Pain] : ear pain [Ear Itch] : ear itch [Dizziness] : dizziness [Vertigo] : vertigo [Ear Noises] : ear noises [Lightheadedness] : lightheadedness [Nasal Congestion] : nasal congestion [Eye Pain] : eye pain [Negative] : Heme/Lymph

## 2023-04-10 LAB — EAR NOSE AND THROAT CULTURE: ABNORMAL

## 2023-04-27 ENCOUNTER — APPOINTMENT (OUTPATIENT)
Dept: CT IMAGING | Facility: CLINIC | Age: 73
End: 2023-04-27

## 2023-06-22 ENCOUNTER — OFFICE (OUTPATIENT)
Dept: URBAN - METROPOLITAN AREA CLINIC 38 | Facility: CLINIC | Age: 73
Setting detail: OPHTHALMOLOGY
End: 2023-06-22
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
## 2023-06-22 PROCEDURE — NO SHOW FE NO SHOW FEE: Performed by: OPHTHALMOLOGY

## 2023-07-24 ENCOUNTER — APPOINTMENT (OUTPATIENT)
Dept: CT IMAGING | Facility: CLINIC | Age: 73
End: 2023-07-24
Payer: COMMERCIAL

## 2023-07-24 PROCEDURE — 74178 CT ABD&PLV WO CNTR FLWD CNTR: CPT | Mod: MH

## 2023-08-04 NOTE — ED ADULT NURSE NOTE - NS ED NOTE ABUSE SUSPICION NEGLECT YN
No fell while walking dog, abrasion on l face, low back pain, difficulty walking after fall, suicidal thoughts d/c from [facility] 1 week ago, not doing well per sister

## 2023-10-20 NOTE — PHYSICAL THERAPY INITIAL EVALUATION ADULT - NAME OF CLINICIAN
Care Transitions Initial Follow Up Call    Call within 2 business days of discharge: Yes    Patient Current Location:  Home: [address] [place] [address]  [address]    Care Transition Nurse contacted the family by telephone to perform post hospital discharge assessment. Verified name and  with family as identifiers. Provided introduction to self, and explanation of the Care Transition Nurse role. Patient: Gladis Costa Patient : 1946   MRN: [id number]  Reason for Admission: Hypotension, occult blood in stool  Discharge Date: 10/18/23 RARS: Readmission Risk Score: 24.5      Last Discharge Facility       Date Complaint Diagnosis Description Type Department Provider    10/16/23 OTHER; Hypotension Hypotension, unspecified hypotension type . .. ED to Hosp-Admission (Discharged) (Fredy Angel) Mae Lassiter MD; Gavino Stephens. .. Was this an external facility discharge? No Discharge Facility: [facility]    Challenges to be reviewed by the provider   Additional needs identified to be addressed with provider: No  none               Method of communication with provider: none. Spoke to pt's spouse. Stated pt remains weak since discharge, is requiring assistance going up and down stairs. Awaiting call from [facility] for resumption of care today. Bowels have moved since discharge. Denies blood in stools, increased dizziness or light headedness. Staying hydrated. Pt has BP kit, advised to monitor BP and contact office if systolic running below 351 or for new/worsening symptoms. Medications reviewed. Pt is awaiting delivery of pantoprazole from pharmacy. Advised to take daily prior to breakfast. Sugars wnl, taking Lantus 30 U daily. Continues on iron and blood thinners. Pt has PCP HFU on 10/31/23, soonest available. No questions or concerns. Agreeable to transitions calls. Care Transition Nurse reviewed discharge instructions with family who verbalized understanding.  The family was given an opportunity
RN

## 2024-01-09 ENCOUNTER — APPOINTMENT (OUTPATIENT)
Dept: CT IMAGING | Facility: CLINIC | Age: 74
End: 2024-01-09
Payer: COMMERCIAL

## 2024-01-09 PROCEDURE — 74178 CT ABD&PLV WO CNTR FLWD CNTR: CPT

## 2024-11-15 ENCOUNTER — APPOINTMENT (OUTPATIENT)
Dept: MRI IMAGING | Facility: CLINIC | Age: 74
End: 2024-11-15

## 2024-11-15 PROCEDURE — 73221 MRI JOINT UPR EXTREM W/O DYE: CPT | Mod: LT,MH

## 2024-11-30 NOTE — ED ADULT TRIAGE NOTE - BP NONINVASIVE SYSTOLIC (MM HG)
"Pt presents to the ED by EMS from [facility] for SOB and a \"death rattle\" per nursing staff from facility. Pt is on Hospice but a full code. Pt has several wounds throughout her body to her lower extremities, breast, and coccyx area. Pt has a chronic martinez catheter. Pt is a poor historian and unable to provide detailed medical hx. Pt is on O2 @ facility but unsure how many liters. Pt placed on 4L NC. Pt c/o pain to her backside d/t her wounds. Pt is bed bound.   "
131

## 2025-06-11 ENCOUNTER — APPOINTMENT (OUTPATIENT)
Dept: CT IMAGING | Facility: CLINIC | Age: 75
End: 2025-06-11
Payer: COMMERCIAL

## 2025-06-11 PROCEDURE — 74178 CT ABD&PLV WO CNTR FLWD CNTR: CPT
